# Patient Record
Sex: FEMALE | Race: WHITE | NOT HISPANIC OR LATINO | Employment: FULL TIME | URBAN - METROPOLITAN AREA
[De-identification: names, ages, dates, MRNs, and addresses within clinical notes are randomized per-mention and may not be internally consistent; named-entity substitution may affect disease eponyms.]

---

## 2019-10-01 ENCOUNTER — HOSPITAL ENCOUNTER (OUTPATIENT)
Dept: OTHER | Facility: HOSPITAL | Age: 49
Discharge: HOME | End: 2019-10-01
Attending: NURSE PRACTITIONER

## 2019-10-01 ENCOUNTER — OFFICE VISIT CONVERTED (OUTPATIENT)
Dept: FAMILY MEDICINE CLINIC | Age: 49
End: 2019-10-01
Attending: NURSE PRACTITIONER

## 2019-10-01 LAB
ANION GAP SERPL CALC-SCNC: 19 MMOL/L (ref 8–19)
BASOPHILS # BLD AUTO: 0.04 10*3/UL (ref 0–0.2)
BASOPHILS NFR BLD AUTO: 0.5 % (ref 0–3)
BUN SERPL-MCNC: 10 MG/DL (ref 5–25)
BUN/CREAT SERPL: 13 {RATIO} (ref 6–20)
CALCIUM SERPL-MCNC: 9.3 MG/DL (ref 8.7–10.4)
CHLORIDE SERPL-SCNC: 102 MMOL/L (ref 99–111)
CONV ABS IMM GRAN: 0.01 10*3/UL (ref 0–0.2)
CONV CO2: 22 MMOL/L (ref 22–32)
CONV IMMATURE GRAN: 0.1 % (ref 0–1.8)
CREAT UR-MCNC: 0.75 MG/DL (ref 0.5–0.9)
DEPRECATED RDW RBC AUTO: 43.4 FL (ref 36.4–46.3)
EOSINOPHIL # BLD AUTO: 0.06 10*3/UL (ref 0–0.7)
EOSINOPHIL # BLD AUTO: 0.7 % (ref 0–7)
ERYTHROCYTE [DISTWIDTH] IN BLOOD BY AUTOMATED COUNT: 12 % (ref 11.7–14.4)
GFR SERPLBLD BASED ON 1.73 SQ M-ARVRAT: >60 ML/MIN/{1.73_M2}
GLUCOSE SERPL-MCNC: 99 MG/DL (ref 65–99)
HCT VFR BLD AUTO: 42.2 % (ref 37–47)
HGB BLD-MCNC: 13.8 G/DL (ref 12–16)
LYMPHOCYTES # BLD AUTO: 2.91 10*3/UL (ref 1–5)
LYMPHOCYTES NFR BLD AUTO: 32.8 % (ref 20–45)
MCH RBC QN AUTO: 31.7 PG (ref 27–31)
MCHC RBC AUTO-ENTMCNC: 32.7 G/DL (ref 33–37)
MCV RBC AUTO: 96.8 FL (ref 81–99)
MONOCYTES # BLD AUTO: 0.54 10*3/UL (ref 0.2–1.2)
MONOCYTES NFR BLD AUTO: 6.1 % (ref 3–10)
NEUTROPHILS # BLD AUTO: 5.31 10*3/UL (ref 2–8)
NEUTROPHILS NFR BLD AUTO: 59.8 % (ref 30–85)
NRBC CBCN: 0 % (ref 0–0.7)
OSMOLALITY SERPL CALC.SUM OF ELEC: 287 MOSM/KG (ref 273–304)
PLATELET # BLD AUTO: 281 10*3/UL (ref 130–400)
PMV BLD AUTO: 11.3 FL (ref 9.4–12.3)
POTASSIUM SERPL-SCNC: 4.2 MMOL/L (ref 3.5–5.3)
RBC # BLD AUTO: 4.36 10*6/UL (ref 4.2–5.4)
SODIUM SERPL-SCNC: 139 MMOL/L (ref 135–147)
WBC # BLD AUTO: 8.87 10*3/UL (ref 4.8–10.8)

## 2019-10-03 LAB — BACTERIA UR CULT: NORMAL

## 2019-10-04 ENCOUNTER — HOSPITAL ENCOUNTER (OUTPATIENT)
Dept: OTHER | Facility: HOSPITAL | Age: 49
Discharge: HOME | End: 2019-10-04
Attending: NURSE PRACTITIONER

## 2020-02-26 ENCOUNTER — OFFICE VISIT CONVERTED (OUTPATIENT)
Dept: FAMILY MEDICINE CLINIC | Age: 50
End: 2020-02-26
Attending: NURSE PRACTITIONER

## 2020-04-04 ENCOUNTER — OFFICE VISIT CONVERTED (OUTPATIENT)
Dept: FAMILY MEDICINE CLINIC | Age: 50
End: 2020-04-04
Attending: NURSE PRACTITIONER

## 2020-04-16 ENCOUNTER — HOSPITAL ENCOUNTER (OUTPATIENT)
Dept: OTHER | Facility: HOSPITAL | Age: 50
Discharge: HOME | End: 2020-04-16
Attending: NURSE PRACTITIONER

## 2020-04-28 ENCOUNTER — OFFICE VISIT CONVERTED (OUTPATIENT)
Dept: FAMILY MEDICINE CLINIC | Age: 50
End: 2020-04-28
Attending: NURSE PRACTITIONER

## 2020-11-11 ENCOUNTER — OFFICE VISIT CONVERTED (OUTPATIENT)
Dept: FAMILY MEDICINE CLINIC | Age: 50
End: 2020-11-11
Attending: NURSE PRACTITIONER

## 2020-12-01 ENCOUNTER — HOSPITAL ENCOUNTER (OUTPATIENT)
Dept: OTHER | Facility: HOSPITAL | Age: 50
Discharge: HOME | End: 2020-12-01
Attending: NURSE PRACTITIONER

## 2021-05-18 NOTE — PROGRESS NOTES
Vickie Chen  1970     Office/Outpatient Visit    Visit Date: Sat 08:42 am    Provider: Nadine Evans N.P. (Assistant: Joe Ceja, )    Location: [facility]        Electronically signed by Nadine Evans N.P. on  2020 10:47:35 AM                             Subjective:        CC: Ms. Chen is a 49 year old female.  left shoulder/arm pain x 1 month;         HPI: 49 year old female presenting for TELEHEALTH visit regarding left shoulder pain x 1 month. She states that she has history of tendonitis in this arm. No known injury. Pain does not radiate. No neck pain. She does have full ROM, just hurts.    ROS:     CONSTITUTIONAL:  Negative for fatigue, fever and night sweats.      CARDIOVASCULAR:  Negative for chest pain and palpitations.      RESPIRATORY:  Negative for recent cough and dyspnea.      MUSCULOSKELETAL:  Positive for arthralgias and joint stiffness.   Negative for myalgias.      INTEGUMENTARY/BREAST:  Negative for rash.      NEUROLOGICAL:  Negative for headaches, paresthesias and weakness.          Past Medical History / Family History / Social History:         Last Reviewed on 2020 08:57 AM by Nadine Evans    Past Medical History:             PAST MEDICAL HISTORY         Positive for    Hypertension;         PREVENTIVE HEALTH MAINTENANCE             COLORECTAL CANCER SCREENING: The next colonoscopy is due  NOW     DENTAL CLEANING: was last done  - [place]     EYE EXAM: was last done  - [facility]     MAMMOGRAM: was last done  with normal results     PAP SMEAR: which was abnormal No longer indicated due to age and history         Surgical History:         hysterectomy (ovaries still in tact) - ;    ACL repair - ;         Family History:     Father:  at age 70;  IPF     Mother: Medical history unknown;  Hypertension;  mental disorder     Brother(s): Renal Failure ( autoimmune (on kidney transplant list) - age 54 )      Son(s): Hypothyroidism     Paternal Grandfather:  at age 60s; Cause of death was unknown     Paternal Grandmother: ;  Alzheimer's Disease         Social History:     Occupation: [employer]     Marital Status:      Children: 1 child         Tobacco/Alcohol/Supplements:     Last Reviewed on 2020 08:57 AM by Nadine Evans    Tobacco: Current Smoker: She currently smokes every day, 1 pack per day.          Alcohol: Frequency: Socially     Caffeine:  She admits to consuming caffeine via coffee and soda.          Substance Abuse History:     Last Reviewed on 2020 08:57 AM by Nadine Evans    None         Mental Health History:     Last Reviewed on 2020 08:57 AM by Nadine Evans    NEGATIVE         Communicable Diseases (eg STDs):     Reportable health conditions;     Chlamydia ( diagnosed 24 years ago )    carrier of strep         Immunizations:     Fluzone Quadrivalent (3+ years) 10/1/2019        Allergies:     Last Reviewed on 2020 08:57 AM by Nadine Evans    Augmentin:          Current Medications:     Last Reviewed on 2020 08:57 AM by Nadine Evans    lisinopriL 20 mg oral tablet [1 tab daily]    buPROPion  mg oral tablet [take 1 1/2 tablet (150mg) by oral route 2 times per day]    ibuprofen 800 mg oral tablet [take 1 tablet (800 mg) by oral route 3 times per day ]    multivitamin oral capsule [1 daily]    magnesium  [1 daily]    Vitamin D3  [1 daily]    B12  [2500 mg 1 daily]    ascorbic acid (vitamin C) 500 mg oral tablet,chewable [1 daily]    zinc 50 mg oral tablet [1 daily]        Assessment:         M25.512   Pain in left shoulder           ORDERS:         Meds Prescribed:       [New Rx] predniSONE 20 mg oral tablet [take 2 tablets (40mg) by mouth daily x 5 days], #10 (ten) tablets, Refills: 0 (zero)       [New Rx] meloxicam 15 mg oral tablet [take 1 tablet (15 mg) by oral route once daily], #30 (thirty) tablets, Refills: 1 (one)                  Plan:         Pain in left shoulderrest, alternate heat and ice, stretch. Take prednisone as rx. Do not take ibuprofen or mobic while taking prednisone. May start mobic after finishing steroid. Notify clinic if shoulder pain does not improve or if it worsens. Xray and possible ortho referral may be needed. Pt v/u and had no further questions.     Telehealth: Verbal consent obtained for visit to occur via phone call; Staff, other than provider, present during telephone visit include Joe PACKER MA; Total time spent was 5 minutes; [id number]--Wwtehksyi E/M 5-10 minutes           Prescriptions:       [New Rx] predniSONE 20 mg oral tablet [take 2 tablets (40mg) by mouth daily x 5 days], #10 (ten) tablets, Refills: 0 (zero)       [New Rx] meloxicam 15 mg oral tablet [take 1 tablet (15 mg) by oral route once daily], #30 (thirty) tablets, Refills: 1 (one)             Charge Capture:         Primary Diagnosis:     M25.512  Pain in left shoulder           Orders:      [id number]  Phys/QHP telephone evaluation 5-10 min  (In-House)

## 2021-05-18 NOTE — PROGRESS NOTES
Vickie Chen 1970     Office/Outpatient Visit    Visit Date: Tue, Oct 1, 2019 03:55 pm    Provider: Patricia Valladares N.P. (Assistant: Rosalba Hayes LPN)    Location: [facility]        Electronically signed by Patricia Valladares N.P. on  10/02/2019 10:41:33 PM                             SUBJECTIVE:        CC:     Ms. Chen is a 49 year old female.  Last monday, pt woke up and her vagina and her anus hurts, and she said that it hurts in her hips. She said it doesn't hurt to pee but she said she has to force it.  She would like a flu shot today as well.;         HPI:         PHQ-9 Depression Screening: Completed form scanned and in chart; Total Score 4         Concerning pelvic pain, female, the location is not described.  It began 1 week ago.  pain sharp in the anus - forcing urine stream - pain seems to be moving  - feels like a pressure in her pelvic area - sharp when voiding it is worse  BM does not make worse Takes ibuprofen daily - this seems to help with the pain     ROS:     CONSTITUTIONAL:  Negative for chills, fatigue and fever.      CARDIOVASCULAR:  Negative for pedal edema.      RESPIRATORY:  Negative for recent cough and dyspnea.      GASTROINTESTINAL:  Positive for abdominal pain, nausea ( only one day - last night ) and blood in stool x 1 episode  - small amount of blood in stool.   Negative for constipation, diarrhea, hematemesis or vomiting.      GENITOURINARY:  Positive for difficulty starting urine stream - does not feel empty when finshed urinating.   Negative for dysuria, hematuria, vaginal discharge or vaginal itching.      MUSCULOSKELETAL:  Negative for arthralgias and myalgias.      INTEGUMENTARY/BREAST:  Negative for pruritis and rash.      NEUROLOGICAL:  Negative for dizziness, fainting, headaches and paresthesias.      ENDOCRINE:  Negative for hair loss, polydipsia and polyphagia.      ALLERGIC/IMMUNOLOGIC:  Negative for seasonal allergies.      PSYCHIATRIC:   Negative for anxiety, depression and suicidal thoughts.          PMH/FMH/SH:     Past Medical History:             PAST MEDICAL HISTORY         Positive for    Hypertension;         PREVENTIVE HEALTH MAINTENANCE             COLORECTAL CANCER SCREENING: The next colonoscopy is due  NOW     DENTAL CLEANING: was last done  - [place]     EYE EXAM: was last done  - [facility]     MAMMOGRAM: was last done  with normal results     PAP SMEAR: which was abnormal No longer indicated due to age and history         Surgical History:         hysterectomy (ovaries still in tact) - ;    ACL repair - ;         Family History:     Father:  at age 70;  IPF     Mother: Medical history unknown;  Hypertension;  mental disorder     Brother(s): Renal Failure ( autoimmune (on kidney transplant list) - age 54 )     Son(s): Hypothyroidism     Paternal Grandfather:  at age 60s; Cause of death was unknown     Paternal Grandmother: ;  Alzheimer's Disease         Social History:     Occupation: [employer]     Marital Status:      Children: 1 child         Tobacco/Alcohol/Supplements:     Last Reviewed on 10/01/2019 04:00 PM by Spurling, Sarah C    Tobacco: Current Smoker: She currently smokes every day, 1 pack per day.          Alcohol: Frequency: Socially     Caffeine:  She admits to consuming caffeine via coffee and soda.          Substance Abuse History:     None         Mental Health History:     NEGATIVE         Communicable Diseases (eg STDs):     Reportable health conditions;     Chlamydia ( diagnosed 24 years ago )    carrier of strep             Current Problems:     Hypertension     Rectal pain     Pelvic pain, female         Immunizations:     Fluzone Quadrivalent (3+ years) 10/1/2019         Allergies:     Last Reviewed on 10/01/2019 04:00 PM by Spurling, Sarah C    Augmentin:        Current Medications:     Last Reviewed on 10/01/2019 04:01 PM by Spurling, Sarah C    Lisinopril 20mg Tablet 1 tab  daily         OBJECTIVE:        Vitals:         Current: 10/1/2019 4:04:03 PM    Ht:  5 ft, 6 in;  Wt: 186.6 lbs;  BMI: 30.1    T: 98.1 F;  BP: 128/86 mm Hg (left arm, sitting);  P: 93 bpm (left arm (BP Cuff), sitting)        Exams:     PHYSICAL EXAM:     GENERAL: vital signs recorded - well developed, well nourished;  no apparent distress;     NECK: range of motion is normal;     RESPIRATORY: normal appearance and symmetric expansion of chest wall; normal respiratory rate and pattern with no distress; normal breath sounds with no rales, rhonchi, wheezes or rubs;     CARDIOVASCULAR: normal rate; rhythm is regular;  no edema;     GASTROINTESTINAL: mild suprapubic tenderness;  normal bowel sounds; rectal exam: no masses; extremely tender in the are of 4 and 5oclock internally with palpation;     GENITOURINARY: vagina: tenderness to palpation in the vaginal canal at 3 and 5 oclock; ; Chaperone: DECLINES     LYMPHATIC: no enlargement of cervical or facial nodes; no supraclavicular nodes;     MUSCULOSKELETAL: normal gait; normal range of motion of all major muscle groups; no limb or joint pain with range of motion;     NEUROLOGIC: mental status: alert and oriented x 3;     PSYCHIATRIC: appropriate affect and demeanor; normal speech pattern; normal thought and perception;         Lab/Test Results:             Glucose, Urine:  Neg (10/01/2019),     Bilirubin, urine:  Negative (10/01/2019),     Ketones, Urine Strip:  Negative (10/01/2019),     Specific Gravity, urine:  1.020 (10/01/2019),     pH, urine:  6.5 (10/01/2019),     Protein Urine QL:  negative (10/01/2019),     Urobilinogen, urine:  0.2 E.U./dL (10/01/2019),     Nitrite, Urine:  Negative (10/01/2019),     Leukoctyes, urine:  Negative (10/01/2019),     Appearance:  Clear (10/01/2019),     collection source:  Clean-catch (10/01/2019),     Color:  Yellow (10/01/2019),     Performed by::  rebekah (10/01/2019),             Procedures:     Vaccination against other viral  diseases, Influenza     1. Influenza, seasonal PF (children 6-35 months): 0.5 ml unit dose given IM in the left upper arm; administered by ad;  lot number [id number]; expires 06/30/2020             ASSESSMENT           V79.0   Z13.31  Screening for depression              DDx:     625.9   R10.2  Pelvic pain, female              DDx:     V04.81   Z23  Vaccination against other viral diseases, Influenza              DDx:     569.42   K62.89  Rectal pain              DDx:         ORDERS:         Lab Orders:       [id number]  Urinalysis, automated, without microscopy  (In-House)         43059  [facility] Urine Culture  (Send-Out)         15118  [facility] Basic Metabolic Panel  (Send-Out)         36[facility]  [facility] diff  ([facility])           Procedures Ordered:       38756  Immunization administration; one vaccine  (In-House)           Other Orders:       98909  Influenza virus vaccine, quadrivalent, split virus, preservative free 3 years of age & older  (In-House)         55966  Computed tomography, abdomen and pelvis; without contrast material  (Send-Out)           Depression screen negative  (In-House)                   PLAN:          Screening for depression     MIPS PHQ-9 Depression Screening: Completed form scanned and in chart; Total Score 4; Negative Depression Screen           Orders:         Depression screen negative  (In-House)            Pelvic pain, female may be perirectal abscess - will get CT to evaluate     LABORATORY:  Labs ordered to be performed today include Urine culture and UA dip in office no micro.      FOLLOW-UP TESTING #1:    RADIOLOGY:  I have ordered Test to be ordered CT of abdomen and pelvis w/o contrast to be done today.            Orders:       18361  Urinalysis, automated, without microscopy  (In-House)         77579  [facility] Urine Culture  (Send-Out)         19007  Computed tomography, abdomen and pelvis; without contrast material  (Send-Out)             Vaccination against other viral diseases, Influenza         IMMUNIZATIONS given today: Influenza Quadrivalent psv free shot 3 & up.            Orders:       83041  Influenza virus vaccine, quadrivalent, split virus, preservative free 3 years of age & older  (In-House)         66875  Immunization administration; one vaccine  (In-House)            Rectal pain     LABORATORY:  Labs ordered to be performed today include basic metabolic panel and CBC.            Orders:       34077  [facility] Basic Metabolic Panel  (Send-Out)         09787  [facility] CBC with 3 part diff  (Send-Out)               CHARGE CAPTURE           **Please note: ICD descriptions below are intended for billing purposes only and may not represent clinical diagnoses**        Primary Diagnosis:         V79.0 Screening for depression            Z13.31    Encounter for screening for depression              Orders:          70200   Office visit - new pt, level 3  (In-House)                Depression screen negative  (In-House)           625.9 Pelvic pain, female            R10.2    Pelvic and perineal pain              Orders:          53856   Urinalysis, automated, without microscopy  (In-House)           V04.81 Vaccination against other viral diseases, Influenza            Z23    Encounter for immunization              Orders:          08722   Influenza virus vaccine, quadrivalent, split virus, preservative free 3 years of age & older  (In-House)             56734   Immunization administration; one vaccine  (In-House)           569.42 Rectal pain            K62.89    Other specified diseases of anus and rectum        ADDENDUMS:      ____________________________________    Addendum: 10/03/2019 05:19 PM - One, Team        provider inf of need for peer to peer she wants me to inf pt that if her pain gets worse to go to the er ok to change the order to with PO contrast./km

## 2021-05-18 NOTE — PROGRESS NOTES
Vickie Chen  1970     Office/Outpatient Visit    Visit Date: Wed, Feb 26, 2020 10:34 am    Provider: Patricia Valladares N.P. (Assistant: Georgia Chavez MA)    Location: [facility]        Electronically signed by Patricia Valladares N.P. on  02/27/2020 10:34:12 AM                             Subjective:        CC: Ms. Chen is a 49 year old female.  This is a follow-up visit.  med refill;         HPI:           Ms. Chen presents with essential (primary) hypertension.  Compliance with treatment has been good.  She is tolerating the medication well without side effects.  She did not bring her blood pressure diary, but says that pressures have been okay.      ROS:     CONSTITUTIONAL:  Negative for chills, fatigue and fever.      CARDIOVASCULAR:  Negative for chest pain and pedal edema.      RESPIRATORY:  Negative for recent cough and dyspnea.      GASTROINTESTINAL:  Negative for abdominal pain, constipation, diarrhea, heartburn, nausea and vomiting.      MUSCULOSKELETAL:  Negative for arthralgias and myalgias.      INTEGUMENTARY/BREAST:  Negative for pruritis and rash.      NEUROLOGICAL:  Negative for dizziness, fainting, headaches and paresthesias.      ENDOCRINE:  Negative for hair loss, polydipsia and polyphagia.      ALLERGIC/IMMUNOLOGIC:  Negative for seasonal allergies.      PSYCHIATRIC:  Positive for sadness.   Negative for anxiety, depression, sleep disturbance or suicidal thoughts.          Past Medical History / Family History / Social History:         Past Medical History:             PAST MEDICAL HISTORY         Positive for    Hypertension;         PREVENTIVE HEALTH MAINTENANCE             COLORECTAL CANCER SCREENING: The next colonoscopy is due  NOW     DENTAL CLEANING: was last done 2019 - [place]     EYE EXAM: was last done 2019 - [facility]     MAMMOGRAM: was last done 2012 with normal results     PAP SMEAR: which was abnormal No longer indicated due to age and history          Surgical History:         hysterectomy (ovaries still in tact) - ;    ACL repair - ;         Family History:     Father:  at age 70;  IPF     Mother: Medical history unknown;  Hypertension;  mental disorder     Brother(s): Renal Failure ( autoimmune (on kidney transplant list) - age 54 )     Son(s): Hypothyroidism     Paternal Grandfather:  at age 60s; Cause of death was unknown     Paternal Grandmother: ;  Alzheimer's Disease         Social History:     Occupation: [employer]     Marital Status:      Children: 1 child         Tobacco/Alcohol/Supplements:     Last Reviewed on 10/01/2019 04:00 PM by Spurling, Sarah C    Tobacco: Current Smoker: She currently smokes every day, 1 pack per day.          Alcohol: Frequency: Socially     Caffeine:  She admits to consuming caffeine via coffee and soda.          Substance Abuse History:     None         Mental Health History:     NEGATIVE         Communicable Diseases (eg STDs):     Reportable health conditions;     Chlamydia ( diagnosed 24 years ago )    carrier of strep         Current Problems:     Hypertension        Immunizations:     Fluzone Quadrivalent (3+ years) 10/1/2019        Allergies:     Last Reviewed on 2020 10:34 AM by Georgia Chavez    Augmentin:          Current Medications:     Last Reviewed on 2020 10:34 AM by Georgia Chavez    Lisinopril 20mg Tablet [1 tab daily]        Objective:        Vitals:         Historical:     10/1/2019  Wt:   186.6lbs    Current: 2020 10:39:22 AM    Ht:  5 ft, 6 in;  Wt: 194.6 lbs;  BMI: 31.4T: 97.3 F (oral);  BP: 128/76 mm Hg (right arm, sitting);  P: 93 bpm (right arm (BP Cuff), sitting);  sCr: 0.75 mg/dL;  GFR: 101.48        Exams:     PHYSICAL EXAM:     GENERAL: vital signs recorded - well developed, well nourished;  no apparent distress;     EYES: extraocular movements intact; PERRL;     E/N/T: EARS: external auditory canal normal;  bilateral TMs are normal;  NOSE:  normal  nasal mucosa, septum, turbinates, and sinuses; OROPHARYNX:  normal mucosa, dentition, gingiva, and posterior pharynx;     NECK: range of motion is normal;     RESPIRATORY: normal appearance and symmetric expansion of chest wall; normal respiratory rate and pattern with no distress; normal breath sounds with no rales, rhonchi, wheezes or rubs;     CARDIOVASCULAR: normal rate; rhythm is regular;  no edema;     GASTROINTESTINAL: nontender; normal bowel sounds; no organomegaly;     LYMPHATIC: no enlargement of cervical or facial nodes; no supraclavicular nodes;     MUSCULOSKELETAL: normal gait; normal range of motion of all major muscle groups; no limb or joint pain with range of motion;     NEUROLOGIC: mental status: alert and oriented x 3;     PSYCHIATRIC: appropriate affect and demeanor; normal speech pattern; normal thought and perception;         Assessment:         I10   Essential (primary) hypertension       F17.200   Nicotine dependence, unspecified, uncomplicated           ORDERS:         Meds Prescribed:       [New Rx] buPROPion  mg oral tablet [take 1 1/2 tablet (150mg) by oral route 2 times per day], #90 (ninety) tablets, Refills: 5 (five)       [Refilled] lisinopriL 20 mg oral tablet [1 tab daily], #90 (ninety) tablets, Refills: 1 (one)         Other Orders:         Smoking and Tobacco Cessation 3 to 10 minutes  (In-House)                      Plan:         Essential (primary) hypertension          Prescriptions:       [Refilled] lisinopriL 20 mg oral tablet [1 tab daily], #90 (ninety) tablets, Refills: 1 (one)         Nicotine dependence, unspecified, uncomplicated        RECOMMENDATIONS given include: Counseled on smoking cessation and advised of the benefits to patient's health if she were to stop smoking. Counseling for 3 to 10 minutes.            Prescriptions:       [New Rx] buPROPion  mg oral tablet [take 1 1/2 tablet (150mg) by oral route 2 times per day], #90 (ninety) tablets,  Refills: 5 (five)           Orders:         Smoking and Tobacco Cessation 3 to 10 minutes  (In-House)                  Patient Recommendations:        For  Nicotine dependence, unspecified, uncomplicated:        Stop smoking.              Charge Capture:         Primary Diagnosis:     I10  Essential (primary) hypertension           Orders:      87530  Office/outpatient visit; established patient, level 3  (In-House)              F17.200  Nicotine dependence, unspecified, uncomplicated           Orders:        Smoking and Tobacco Cessation 3 to 10 minutes  (In-House)

## 2021-05-18 NOTE — PROGRESS NOTES
Vickie Chen  1970     Office/Outpatient Visit    Visit Date: Wed, Nov 11, 2020 11:38 am    Provider: Patricia Valladares N.P. (Assistant: Che Streeter LPN)    Location: [facility]        Electronically signed by Patricia Valladares N.P. on  11/17/2020 06:54:26 AM                             Subjective:        CC: Ms. Chen is a 50 year old female.  presents today due to knee pain         HPI:           Complaint of pain in right knee..  The symptom began 2 days ago.  WAS AT WORK , CLIMBING ON A BELT TO CLEAR A BELT FROM CLOGGED PACKAGES - FELT A POP IN THE KNEE - PAIN   NO SWELLING  NOTED           Complaint of carbuncle of groin..  The symptom began years ago.  This area comes and goes regularly - she does shave but keeps that area shaved daily.  She has never had to have it I&Dd but says they are starting to happen more frequently.      ROS:     CONSTITUTIONAL:  Negative for chills, fatigue and fever.      CARDIOVASCULAR:  Negative for chest pain and pedal edema.      MUSCULOSKELETAL:  Positive for joint stiffness (right knee).   Negative for arthralgias or myalgias.      INTEGUMENTARY/BREAST:  Negative for pruritis and rash.      NEUROLOGICAL:  Positive for weakness ( right knee weakness / giving way when walks ).   Negative for dizziness, fainting, headaches or paresthesias.          Past Medical History / Family History / Social History:         Last Reviewed on 4/28/2020 10:30 AM by Nadine Evans    Past Medical History:             PAST MEDICAL HISTORY         Positive for    Hypertension;         PREVENTIVE HEALTH MAINTENANCE             COLORECTAL CANCER SCREENING: The next colonoscopy is due  NOW     DENTAL CLEANING: was last done 2019 - [place]     EYE EXAM: was last done 2019 - [facility]     MAMMOGRAM: was last done 2012 with normal results     PAP SMEAR: which was abnormal No longer indicated due to age and history         Surgical History:          hysterectomy (ovaries still in tact) - ;    ACL repair - ;         Family History:     Father:  at age 70;  IPF     Mother: Medical history unknown;  Hypertension;  mental disorder     Brother(s): Renal Failure ( autoimmune (on kidney transplant list) - age 54 )     Son(s): Hypothyroidism     Paternal Grandfather:  at age 60s; Cause of death was unknown     Paternal Grandmother: ;  Alzheimer's Disease         Social History:     Occupation: [employer]     Marital Status:      Children: 1 child         Tobacco/Alcohol/Supplements:     Last Reviewed on 2020 10:30 AM by Nadine Evans    Tobacco: Current Smoker: She currently smokes every day, 1 pack per day.          Alcohol: Frequency: Socially     Caffeine:  She admits to consuming caffeine via coffee and soda.          Substance Abuse History:     Last Reviewed on 2020 10:30 AM by Nadine Evans    None         Mental Health History:     Last Reviewed on 2020 10:30 AM by Nadine Evans    NEGATIVE         Communicable Diseases (eg STDs):     Reportable health conditions;     Chlamydia ( diagnosed 24 years ago )    carrier of strep         Current Problems:     Last Reviewed on 2020 10:30 AM by Nadine Evans    Nicotine dependence, unspecified, uncomplicated    Essential (primary) hypertension    Pain in left shoulder    Other long term (current) drug therapy        Immunizations:     Fluzone Quadrivalent (3+ years) 10/1/2019        Allergies:     Last Reviewed on 2020 10:30 AM by Nadine Evans    Augmentin:          Current Medications:     Last Reviewed on 2020 10:30 AM by Nadine Evans    ibuprofen 800 mg oral tablet [take 1 tablet (800 mg) by oral route 3 times per day ]    multivitamin oral capsule [1 daily]    magnesium  [1 daily]    Vitamin D3  [1 daily]    B12  [2500 mg 1 daily]    ascorbic acid (vitamin C) 500 mg oral tablet,chewable [1 daily]    zinc 50 mg oral tablet [1 daily]     lisinopriL 20 mg oral tablet [TAKE 1 TABLET DAILY]    buPROPion  mg oral tablet [take 1 1/2 tablet (150mg) by oral route 2 times per day]    meloxicam 15 mg oral tablet [TAKE 1 TABLET BY MOUTH EVERY DAY]    traMADol 50 mg oral tablet [take 1 tablet (50 mg) by oral route every 6 hours as needed]    cyclobenzaprine 10 mg oral tablet [take 1 tablet (10 mg) by oral route 3 times daily as needed for muscle spasm]        Objective:        Vitals:         Current: 11/11/2020 11:42:18 AM    Ht:  5 ft, 6 in;  Wt: 198.2 lbs;  BMI: 32.0T: 96.9 F (oral);  BP: 136/63 mm Hg (right arm, sitting);  P: 79 bpm (right arm (BP Cuff), sitting);  sCr: 0.75 mg/dL;  GFR: 101.17        Exams:     PHYSICAL EXAM:     GENERAL: vital signs recorded - well developed, well nourished;  no apparent distress;     RESPIRATORY: normal appearance and symmetric expansion of chest wall; normal respiratory rate and pattern with no distress; normal breath sounds with no rales, rhonchi, wheezes or rubs;     CARDIOVASCULAR: normal rate; rhythm is regular;  no edema;     MUSCULOSKELETAL: gait: affected by a right leg limp;  normal range of motion of all major muscle groups; pain with range of motion in: right knee extension;  positive Micha test     NEUROLOGIC: mental status: alert and oriented x 3;     PSYCHIATRIC: appropriate affect and demeanor; normal speech pattern; normal thought and perception;         Assessment:         Z23   Encounter for immunization       M25.561   Pain in right knee       L02.234   Carbuncle of groin           ORDERS:         Meds Prescribed:       [New Rx] doxycycline hyclate 100 mg oral capsule [take 1 capsule (100 mg) by oral route 2 times per day 7-10 days], #20 (twenty) capsules, Refills: 0 (zero)       [New Rx] Diflucan 150 mg oral tablet [take 1 tablet (150 mg) by oral route now  may repeat in 1 week], #2 (two) tablets, Refills: 0 (zero)         Radiology/Test Orders:       [id number]  Right MRI, any joint of lower  extremity w/o contrast  (Send-Out)              Procedures Ordered:       [id number]  Immunization administration; one vaccine  (In-House)              Other Orders:       [id number]  Influenza virus vaccine, quadrivalent, split virus, preservative free 3 years of age & older  (In-House)                      Plan:         Encounter for immunization          Immunizations:       99903  Immunization administration; one vaccine  (In-House)            86177  Influenza virus vaccine, quadrivalent, split virus, preservative free 3 years of age & older  (In-House)                Dose (ml): 0.5  Site: left deltoid  Route: intramuscular  Administered by: Che Streeter          : [employer]  Lot #: [id number]  Exp: 06/30/2021          NDC: [address]-53        Pain in right kneeinstructed to get neoprene sleeve.  Will get MRI of the knee and have her off work until the MRI results are back  or sooner if she feels she is able to work if we are unable to get the MRI soon -  I would recommend rest, Ice, resume NSAIDs as previous  and follow up should she worsen        FOLLOW-UP TESTING #1:    RADIOLOGY:  I have ordered a knee MRI to be done today.            Orders:       [id number]  Right MRI, any joint of lower extremity w/o contrast  (Send-Out)              Carbuncle of groin          Prescriptions:       [New Rx] doxycycline hyclate 100 mg oral capsule [take 1 capsule (100 mg) by oral route 2 times per day 7-10 days], #20 (twenty) capsules, Refills: 0 (zero)       [New Rx] Diflucan 150 mg oral tablet [take 1 tablet (150 mg) by oral route now  may repeat in 1 week], #2 (two) tablets, Refills: 0 (zero)             Charge Capture:         Primary Diagnosis:     Z23  Encounter for immunization           Orders:      [id number]  Office/outpatient visit; established patient, level 3  (In-House)            55255  Immunization administration; one vaccine  (In-House)            08384  Influenza virus vaccine, quadrivalent, split  virus, preservative free 3 years of age & older  (In-House)              M25.561  Pain in right knee     L02.234  Carbuncle of groin

## 2021-05-18 NOTE — PROGRESS NOTES
Vickie Chen  1970     Office/Outpatient Visit    Visit Date:  09:54 am    Provider: Nadine Evans N.P. (Assistant: Vesta Mota RN)    Location: [facility]        Electronically signed by Nadine Evans N.P. on  2020 10:50:07 AM                             Subjective:        CC: Ms. Chen is a 49 year old female.  follow up on arm pain; [employer]        HPI: 49 year old female presenting for TELEHEALTH visit c/o continued left shoulder pain. The only incident that may have caused pain is screen door being hard to open. She states that muscles are now twitching in her left arm and pain is centered right at left shoulder joint. Her MRI is scheduled for May 9th.    ROS:     CONSTITUTIONAL:  Negative for fatigue and fever.      CARDIOVASCULAR:  Negative for chest pain.      RESPIRATORY:  Negative for recent cough and dyspnea.      MUSCULOSKELETAL:  Positive for arthralgias, joint stiffness and limb pain.      INTEGUMENTARY/BREAST:  Negative for rash.      NEUROLOGICAL:  Positive for weakness ( left upper extremity ).   Negative for dizziness or paresthesias.          Past Medical History / Family History / Social History:         Last Reviewed on 2020 10:30 AM by Nadine Evans    Past Medical History:             PAST MEDICAL HISTORY         Positive for    Hypertension;         PREVENTIVE HEALTH MAINTENANCE             COLORECTAL CANCER SCREENING: The next colonoscopy is due  NOW     DENTAL CLEANING: was last done  - [place]     EYE EXAM: was last done  - [facility]     MAMMOGRAM: was last done  with normal results     PAP SMEAR: which was abnormal No longer indicated due to age and history         Surgical History:         hysterectomy (ovaries still in tact) - ;    ACL repair - ;         Family History:     Father:  at age 70;  IPF     Mother: Medical history unknown;  Hypertension;  mental disorder     Brother(s): Renal  Failure ( autoimmune (on kidney transplant list) - age 54 )     Son(s): Hypothyroidism     Paternal Grandfather:  at age 60s; Cause of death was unknown     Paternal Grandmother: ;  Alzheimer's Disease         Social History:     Occupation: [employer]     Marital Status:      Children: 1 child         Tobacco/Alcohol/Supplements:     Last Reviewed on 2020 10:30 AM by Nadine Evans    Tobacco: Current Smoker: She currently smokes every day, 1 pack per day.          Alcohol: Frequency: Socially     Caffeine:  She admits to consuming caffeine via coffee and soda.          Substance Abuse History:     Last Reviewed on 2020 10:30 AM by Nadine Evans    None         Mental Health History:     Last Reviewed on 2020 10:30 AM by Nadine Evans    NEGATIVE         Communicable Diseases (eg STDs):     Reportable health conditions;     Chlamydia ( diagnosed 24 years ago )    carrier of strep         Immunizations:     Fluzone Quadrivalent (3+ years) 10/1/2019        Allergies:     Last Reviewed on 2020 10:30 AM by Nadine Evans    Augmentin:          Current Medications:     Last Reviewed on 2020 10:30 AM by Nadine Evans    ibuprofen 800 mg oral tablet [take 1 tablet (800 mg) by oral route 3 times per day ]    multivitamin oral capsule [1 daily]    magnesium  [1 daily]    Vitamin D3  [1 daily]    B12  [2500 mg 1 daily]    ascorbic acid (vitamin C) 500 mg oral tablet,chewable [1 daily]    zinc 50 mg oral tablet [1 daily]    lisinopriL 20 mg oral tablet [1 tab daily]    buPROPion  mg oral tablet [take 1 1/2 tablet (150mg) by oral route 2 times per day]    meloxicam 15 mg oral tablet [take 1 tablet (15 mg) by oral route once daily]    traMADol 50 mg oral tablet [take 1 tablet (50 mg) by oral route every 6 hours as needed]        Objective:        Exams:     PHYSICAL EXAM:     GENERAL: well developed, well nourished;  well groomed;  no apparent distress;      RESPIRATORY: normal respiratory rate and pattern with no distress;     CARDIOVASCULAR: normal rate; rhythm is regular;  no systolic murmur; no edema;     MUSCULOSKELETAL: normal gait; pain with range of motion in: left shoulder extension;  normal overall tone     NEUROLOGIC: mental status: alert and oriented x 3;     PSYCHIATRIC:  appropriate affect and demeanor; normal speech pattern; grossly normal memory;         Assessment:         M25.512   Pain in left shoulder           ORDERS:         Meds Prescribed:       [New Rx] cyclobenzaprine 10 mg oral tablet [take 1 tablet (10 mg) by oral route 3 times daily as needed for muscle spasm], #30 (thirty) tablets, Refills: 0 (zero)                 Plan:         Pain in left shoulderPt to take Ultram. Can take meloxicam as well. Flexeril as needed for muscle spasms. Keep scheduled appt for MRI and notify clinic with any acute concerns or issues. Pt v/u and had no further questions upon d/c.     Telehealth: Verbal consent obtained for visit to occur via televideo conferencing; Staff, other than provider, present during telephone visit include Vesta Mota MA           Prescriptions:       [New Rx] cyclobenzaprine 10 mg oral tablet [take 1 tablet (10 mg) by oral route 3 times daily as needed for muscle spasm], #30 (thirty) tablets, Refills: 0 (zero)             Charge Capture:         Primary Diagnosis:     M25.512  Pain in left shoulder           Orders:      [id number]  Office/outpatient visit; established patient, level 3  (In-House)

## 2021-05-25 ENCOUNTER — HOSPITAL ENCOUNTER (OUTPATIENT)
Dept: OTHER | Facility: HOSPITAL | Age: 51
Discharge: HOME | End: 2021-05-25
Attending: NURSE PRACTITIONER

## 2021-05-25 ENCOUNTER — OFFICE VISIT CONVERTED (OUTPATIENT)
Dept: FAMILY MEDICINE CLINIC | Age: 51
End: 2021-05-25
Attending: NURSE PRACTITIONER

## 2021-05-25 LAB
ALBUMIN SERPL-MCNC: 4.2 G/DL (ref 3.5–5)
ALBUMIN/GLOB SERPL: 1.4 {RATIO} (ref 1.4–2.6)
ALP SERPL-CCNC: 63 U/L (ref 53–141)
ALT SERPL-CCNC: 18 U/L (ref 10–40)
ANION GAP SERPL CALC-SCNC: 14 MMOL/L (ref 8–19)
AST SERPL-CCNC: 17 U/L (ref 15–50)
BILIRUB SERPL-MCNC: 0.23 MG/DL (ref 0.2–1.3)
BUN SERPL-MCNC: 8 MG/DL (ref 5–25)
BUN/CREAT SERPL: 12 {RATIO} (ref 6–20)
CALCIUM SERPL-MCNC: 9.1 MG/DL (ref 8.7–10.4)
CHLORIDE SERPL-SCNC: 102 MMOL/L (ref 99–111)
CONV CO2: 25 MMOL/L (ref 22–32)
CONV TOTAL PROTEIN: 7.3 G/DL (ref 6.3–8.2)
CREAT UR-MCNC: 0.65 MG/DL (ref 0.5–0.9)
GFR SERPLBLD BASED ON 1.73 SQ M-ARVRAT: >60 ML/MIN/{1.73_M2}
GLOBULIN UR ELPH-MCNC: 3.1 G/DL (ref 2–3.5)
GLUCOSE SERPL-MCNC: 102 MG/DL (ref 65–99)
OSMOLALITY SERPL CALC.SUM OF ELEC: 283 MOSM/KG (ref 273–304)
POTASSIUM SERPL-SCNC: 4.3 MMOL/L (ref 3.5–5.3)
SODIUM SERPL-SCNC: 137 MMOL/L (ref 135–147)

## 2021-06-05 NOTE — PROGRESS NOTES
Vickie Chen  1970     Office/Outpatient Visit    Visit Date: Tue, May 25, 2021 10:38 am    Provider: Patricia Valladares N.P. (Assistant: Radha Yun MA)    Location: [facility]        Electronically signed by Patricia Valladares N.P. on  05/25/2021 07:06:37 PM                             Subjective:        CC: Ms. Chen is a 50 year old female.  This is a follow-up visit.  med refills, pt out of buproprion, not sure if she wants to continue it;         HPI:           Patient to be evaluated for essential (primary) hypertension.  This was first diagnosed several years ago.  Her current cardiac medication regimen includes an ACE inhibitor ( Lisinopril ).  Compliance with treatment has been good.  She is tolerating the medication well without side effects.  She did not bring her blood pressure diary, but says that pressures have been okay.            Complaint of attention and concentration deficit..  Ms. Chen is concerned that she has recently been instructed to re-enter school and is having a very hard time with concentraion and maintaining grades/ work.  She is working online as a full time student and working full time along with her life duties and feels that she needs some assistance.  She has never been diagnosed with ADD but her son was as a child.            Complaint of nicotine dependence, unspecified, uncomplicated..  reports taht the generic wellbutrin did not help and wants to see if brand name will help as it did in the past     ROS:     CONSTITUTIONAL:  Positive for fatigue.   Negative for chills or fever.      CARDIOVASCULAR:  Negative for chest pain and pedal edema.      RESPIRATORY:  Negative for recent cough and dyspnea.      PSYCHIATRIC:  Positive for anxiety, feelings of stress and difficulty concentrating.   Negative for depression, anhedonia, mood swings or suicidal thoughts.          Past Medical History / Family History / Social History:         Last  Reviewed on 2021 11:19 AM by Patricia Valladares    Past Medical History:             PAST MEDICAL HISTORY         Positive for    Hypertension;         PREVENTIVE HEALTH MAINTENANCE             COLORECTAL CANCER SCREENING: The next colonoscopy is due  NOW     DENTAL CLEANING: was last done  - [place]     EYE EXAM: was last done  - [facility]     MAMMOGRAM: was last done  with normal results     PAP SMEAR: which was abnormal No longer indicated due to age and history         Surgical History:         hysterectomy (ovaries still in tact) - ;    ACL repair - ;         Family History:     Father:  at age 70;  IPF     Mother: Medical history unknown;  Hypertension;  mental disorder     Brother(s): Renal Failure ( autoimmune (on kidney transplant list) - age 54 )     Son(s): Hypothyroidism     Paternal Grandfather:  at age 60s; Cause of death was unknown     Paternal Grandmother: ;  Alzheimer's Disease         Social History:     Occupation: [employer]     Marital Status:      Children: 1 child         Tobacco/Alcohol/Supplements:     Last Reviewed on 2021 10:41 AM by Radha Yun    Tobacco: Current Smoker: She currently smokes every day, 1 pack per day.          Alcohol: Frequency: Socially     Caffeine:  She admits to consuming caffeine via coffee and soda.          Substance Abuse History:     Last Reviewed on 2020 10:30 AM by Nadine Evans    None         Mental Health History:     Last Reviewed on 2020 10:30 AM by Nadine Evans    NEGATIVE         Communicable Diseases (eg STDs):     Reportable health conditions;     Chlamydia ( diagnosed 24 years ago )    carrier of strep         Current Problems:     Last Reviewed on 2021 11:19 AM by Patricia Valladares    Nicotine dependence, unspecified, uncomplicated    Essential (primary) hypertension    Pain in left shoulder    Other long term (current) drug therapy    Pain in right knee    Encounter for  immunization    Carbuncle of groin    Attention and concentration deficit        Immunizations:     influenza, injectable, quadrivalent, preservative free (FLUZONE QUAD [id number]) 11/11/2020    Fluzone Quadrivalent (3+ years) 10/1/2019        Allergies:     Last Reviewed on 5/25/2021 10:41 AM by Radha Yun    Augmentin:          Current Medications:     Last Reviewed on 5/25/2021 10:41 AM by Radha Yun    ibuprofen 800 mg oral tablet [take 1 tablet (800 mg) by oral route 3 times per day ]    multivitamin oral capsule [1 daily]    magnesium  [1 daily]    Vitamin D3  [1 daily]    B12  [2500 mg 1 daily]    ascorbic acid (vitamin C) 500 mg oral tablet,chewable [1 daily]    zinc 50 mg oral tablet [1 daily]    lisinopriL 20 mg oral tablet [TAKE 1 TABLET DAILY]    meloxicam 15 mg oral tablet [TAKE 1 TABLET BY MOUTH EVERY DAY]        Objective:        Vitals:         Current: 5/25/2021 10:43:12 AM    Ht:  5 ft, 6 in;  Wt: 202.4 lbs;  BMI: 32.7T: 98.2 F (oral);  BP: 121/63 mm Hg (left arm, sitting);  P: 70 bpm (left arm (BP Cuff), sitting);  sCr: 0.75 mg/dL;  GFR: 102.07        Exams:     PHYSICAL EXAM:     GENERAL: vital signs recorded - well developed, well nourished;  no apparent distress;     NECK: range of motion is normal;     RESPIRATORY: normal appearance and symmetric expansion of chest wall; normal respiratory rate and pattern with no distress; normal breath sounds with no rales, rhonchi, wheezes or rubs;     CARDIOVASCULAR: normal rate; rhythm is regular;  no edema;     LYMPHATIC: no enlargement of cervical or facial nodes; no supraclavicular nodes;     MUSCULOSKELETAL: normal gait; normal range of motion of all major muscle groups; no limb or joint pain with range of motion;     NEUROLOGIC: mental status: alert and oriented x 3;     PSYCHIATRIC: affect/demeanor: anxious, tearful;  normal speech pattern; normal thought and perception;         Assessment:         I10   Essential (primary) hypertension        R41.840   Attention and concentration deficit       F17.200   Nicotine dependence, unspecified, uncomplicated           ORDERS:         Meds Prescribed:       [New Rx] Wellbutrin  mg oral tablet, sustained-release 12 hr [take 1 tablet (150 mg) by oral route 2 times per day], #60 (sixty) tablets, Refills: 3 (three)       [Refilled] lisinopriL 20 mg oral tablet [TAKE 1 TABLET DAILY], #90 (ninety) tablets, Refills: 1 (one)         Lab Orders:       [id number]  [facility] Comp. Metabolic Panel  (Send-Out)              Procedures Ordered:       REFER  Referral to Specialist or Other Facility  (Send-Out)                      Plan:         Essential (primary) hypertensionWill continue current dose and have her follow up in 6 months     LABORATORY:  Labs ordered to be performed today include Comprehensive metabolic panel.            Prescriptions:       [Refilled] lisinopriL 20 mg oral tablet [TAKE 1 TABLET DAILY], #90 (ninety) tablets, Refills: 1 (one)           Orders:       [id number]  [facility] Comp. Metabolic Panel  (Send-Out)              Attention and concentration deficitrefer to mental health for evaluation for possible ADD - adult onset - and recommended treatment         REFERRALS:  Referral initiated to a psychiatrist ( Dr. Faith Enrique [facility] (med mgmt only) ).            Orders:       REFER  Referral to Specialist or Other Facility  (Send-Out)              Nicotine dependence, unspecified, uncomplicatedDiscussed that Wellbutrin may also help with her ADD, but will continue with the referral and Rx for nicotine dependence           Prescriptions:       [New Rx] Wellbutrin  mg oral tablet, sustained-release 12 hr [take 1 tablet (150 mg) by oral route 2 times per day], #60 (sixty) tablets, Refills: 3 (three)             Charge Capture:         Primary Diagnosis:     I10  Essential (primary) hypertension           Orders:      [id number]  Office/outpatient visit; established patient, level 4   (In-House)              R41.840  Attention and concentration deficit     F17.200  Nicotine dependence, unspecified, uncomplicated

## 2021-06-12 DIAGNOSIS — R41.840 CONCENTRATION DEFICIT: Primary | ICD-10-CM

## 2021-07-01 VITALS
HEART RATE: 93 BPM | WEIGHT: 186.6 LBS | BODY MASS INDEX: 29.99 KG/M2 | TEMPERATURE: 98.1 F | HEIGHT: 66 IN | DIASTOLIC BLOOD PRESSURE: 86 MMHG | SYSTOLIC BLOOD PRESSURE: 128 MMHG

## 2021-07-02 VITALS
SYSTOLIC BLOOD PRESSURE: 121 MMHG | HEIGHT: 66 IN | BODY MASS INDEX: 32.53 KG/M2 | TEMPERATURE: 98.2 F | HEART RATE: 70 BPM | DIASTOLIC BLOOD PRESSURE: 63 MMHG | WEIGHT: 202.4 LBS

## 2021-07-02 VITALS
TEMPERATURE: 97.3 F | HEART RATE: 93 BPM | HEIGHT: 66 IN | WEIGHT: 194.6 LBS | BODY MASS INDEX: 31.27 KG/M2 | DIASTOLIC BLOOD PRESSURE: 76 MMHG | SYSTOLIC BLOOD PRESSURE: 128 MMHG

## 2021-07-02 VITALS
WEIGHT: 198.2 LBS | SYSTOLIC BLOOD PRESSURE: 136 MMHG | HEIGHT: 66 IN | HEART RATE: 79 BPM | BODY MASS INDEX: 31.85 KG/M2 | DIASTOLIC BLOOD PRESSURE: 63 MMHG | TEMPERATURE: 96.9 F

## 2021-08-31 RX ORDER — BUPROPION HYDROCHLORIDE 150 MG/1
150 TABLET, EXTENDED RELEASE ORAL 2 TIMES DAILY
Qty: 180 TABLET | Refills: 0 | Status: SHIPPED | OUTPATIENT
Start: 2021-08-31 | End: 2021-11-16

## 2021-08-31 RX ORDER — BUPROPION HYDROCHLORIDE 150 MG/1
1 TABLET, EXTENDED RELEASE ORAL 2 TIMES DAILY
COMMUNITY
Start: 2021-05-25 | End: 2021-08-31 | Stop reason: SDUPTHER

## 2021-11-16 ENCOUNTER — TELEPHONE (OUTPATIENT)
Dept: FAMILY MEDICINE CLINIC | Age: 51
End: 2021-11-16

## 2021-11-16 RX ORDER — BUPROPION HYDROCHLORIDE 150 MG/1
150 TABLET, EXTENDED RELEASE ORAL 2 TIMES DAILY
Qty: 60 TABLET | Refills: 0 | Status: SHIPPED | OUTPATIENT
Start: 2021-11-16 | End: 2021-11-23 | Stop reason: SDUPTHER

## 2021-11-16 NOTE — TELEPHONE ENCOUNTER
HUB UNABLE TO WARM TRANSFER     Caller: Vickie Chen    Relationship: Self    Best call back number: [phone]     What is the best time to reach you: ANY    Who are you requesting to speak with (clinical staff, provider,  specific staff member): CLINICAL    Do you know the name of the person who called: DANIEL    What was the call regarding: PRESCRIPTION    Do you require a callback: YES

## 2021-11-16 NOTE — TELEPHONE ENCOUNTER
Rx Refill Note  Requested Prescriptions     Pending Prescriptions Disp Refills   • buPROPion SR (WELLBUTRIN SR) 150 MG 12 hr tablet [Pharmacy Med Name: BUPROP 12 SR TAB 150MG(W)] 180 tablet 0     Sig: TAKE 1 TABLET TWICE A DAY      Last office visit with prescribing clinician: 5/25/21    Next office visit with prescribing clinician: Visit date not found/ LMTRC NEEDS APPT FOR FURTHER REFILLS         {TIP  Is Refill Pharmacy correct?yes  Patt Baker  11/16/21, 16:01 EST

## 2021-11-17 RX ORDER — BUPROPION HYDROCHLORIDE 150 MG/1
150 TABLET, EXTENDED RELEASE ORAL 2 TIMES DAILY
Qty: 60 TABLET | Refills: 0 | OUTPATIENT
Start: 2021-11-17

## 2021-11-23 ENCOUNTER — OFFICE VISIT (OUTPATIENT)
Dept: FAMILY MEDICINE CLINIC | Age: 51
End: 2021-11-23

## 2021-11-23 VITALS
TEMPERATURE: 97.6 F | HEIGHT: 66 IN | DIASTOLIC BLOOD PRESSURE: 68 MMHG | SYSTOLIC BLOOD PRESSURE: 133 MMHG | HEART RATE: 75 BPM | BODY MASS INDEX: 30.05 KG/M2 | WEIGHT: 187 LBS

## 2021-11-23 DIAGNOSIS — M79.672 FOOT PAIN, BILATERAL: ICD-10-CM

## 2021-11-23 DIAGNOSIS — I10 ESSENTIAL HYPERTENSION: Primary | ICD-10-CM

## 2021-11-23 DIAGNOSIS — L02.93: ICD-10-CM

## 2021-11-23 DIAGNOSIS — M79.671 FOOT PAIN, BILATERAL: ICD-10-CM

## 2021-11-23 DIAGNOSIS — F41.9 ANXIETY: ICD-10-CM

## 2021-11-23 DIAGNOSIS — R41.840 CONCENTRATION DEFICIT: ICD-10-CM

## 2021-11-23 PROCEDURE — 99213 OFFICE O/P EST LOW 20 MIN: CPT | Performed by: NURSE PRACTITIONER

## 2021-11-23 RX ORDER — MELOXICAM 15 MG/1
15 TABLET ORAL DAILY
Qty: 90 TABLET | Refills: 1 | Status: SHIPPED | OUTPATIENT
Start: 2021-11-23 | End: 2022-02-15 | Stop reason: SINTOL

## 2021-11-23 RX ORDER — BUPROPION HYDROCHLORIDE 150 MG/1
150 TABLET, EXTENDED RELEASE ORAL 2 TIMES DAILY
Qty: 180 TABLET | Refills: 1 | Status: SHIPPED | OUTPATIENT
Start: 2021-11-23 | End: 2021-11-30 | Stop reason: DRUGHIGH

## 2021-11-23 RX ORDER — LISINOPRIL 20 MG/1
1 TABLET ORAL DAILY
COMMUNITY
Start: 2021-10-26 | End: 2021-11-23 | Stop reason: SDUPTHER

## 2021-11-23 RX ORDER — LISINOPRIL 20 MG/1
20 TABLET ORAL DAILY
Qty: 90 TABLET | Refills: 1 | Status: SHIPPED | OUTPATIENT
Start: 2021-11-23 | End: 2022-05-23 | Stop reason: SDUPTHER

## 2021-11-23 RX ORDER — DOXYCYCLINE HYCLATE 100 MG/1
100 CAPSULE ORAL 2 TIMES DAILY PRN
Qty: 30 CAPSULE | Refills: 1 | Status: SHIPPED | OUTPATIENT
Start: 2021-11-23

## 2021-11-23 NOTE — PROGRESS NOTES
Chief Complaint  Vickie Chen presents to [facility] for Hypertension (med refill )    Subjective          Vickie presents for follow up on hypertension.  Compliance with medication is reported as good   Check of BP at home reported as well controlled.  No new concerns or problems to report.    Bilateral foot pain - having to take meloxicam daily.  Has changed jobs and shoes and has been helping    Anxiety - off and on due to life stressors.  Taking bupropion and wants to continue.  Did not return call for referral to Dr. Enrique report that still wants evaluation for ADHD (I have provided his number to her today)    Regarding recurrent carbuncle - develops in the groin  Takes doxy PRN working well         Review of Systems      Allergies   Allergen Reactions   • Amoxicillin-Pot Clavulanate Nausea And Vomiting      No past medical history on file.  Current Outpatient Medications   Medication Sig Dispense Refill   • buPROPion SR (WELLBUTRIN SR) 150 MG 12 hr tablet Take 1 tablet by mouth 2 (Two) Times a Day. 180 tablet 1   • lisinopril (PRINIVIL,ZESTRIL) 20 MG tablet Take 1 tablet by mouth Daily. 90 tablet 1   • doxycycline (VIBRAMYCIN) 100 MG capsule Take 1 capsule by mouth 2 (Two) Times a Day As Needed (outbreaks). 30 capsule 1   • meloxicam (MOBIC) 15 MG tablet Take 1 tablet by mouth Daily. 90 tablet 1     No current facility-administered medications for this visit.     No past surgical history on file.   Social History     Tobacco Use   • Smoking status: Current Every Day Smoker     Packs/day: 1.00     Years: 30.00     Pack years: 30.00     Types: Cigarettes   • Smokeless tobacco: Never Used   Substance Use Topics   • Alcohol use: Not on file   • Drug use: Not on file     No family history on file.  Health Maintenance Due   Topic Date Due   • MAMMOGRAM  Never done   • COLORECTAL CANCER SCREENING  Never done   • ANNUAL PHYSICAL  Never done   • Pneumococcal Vaccine 0-64 (1 of 2 -  "PPSV23) Never done   • ZOSTER VACCINE (1 of 2) Never done   • LUNG CANCER SCREENING  Never done   • HEPATITIS C SCREENING  Never done   • PAP SMEAR  Never done      Immunization History   Administered Date(s) Administered   • COVID-19 (PFIZER) 10/17/2021, 11/07/2021   • FluLaval/Fluarix/Fluzone >6 11/02/2016, 11/03/2017, 11/06/2018   • Influenza TIV (IM) 10/31/2012, 10/01/2013   • Influenza, Unspecified 10/23/2009, 10/13/2015   • Tdap 11/03/2017        Objective     Vitals:    11/23/21 0804   BP: 133/68   BP Location: Left arm   Patient Position: Sitting   Pulse: 75   Temp: 97.6 °F (36.4 °C)   Weight: 84.8 kg (187 lb)   Height: 167.6 cm (65.98\")     Body mass index is 30.2 kg/m².     Physical Exam  Constitutional:       General: She is not in acute distress.     Appearance: Normal appearance.   HENT:      Head: Normocephalic.   Cardiovascular:      Rate and Rhythm: Normal rate and regular rhythm.   Pulmonary:      Effort: Pulmonary effort is normal.      Breath sounds: Normal breath sounds.   Musculoskeletal:         General: Normal range of motion.   Neurological:      General: No focal deficit present.      Mental Status: She is alert and oriented to person, place, and time.   Psychiatric:         Mood and Affect: Mood normal.         Behavior: Behavior normal.           Result Review :                               Assessment and Plan      Diagnoses and all orders for this visit:    1. Essential hypertension (Primary)  Comments:  continue current treatment  follow up in 6 months   Assessment & Plan:  Hypertension is unchanged.  Continue current treatment regimen.  Blood pressure will be reassessed at the next regular appointment.    Orders:  -     lisinopril (PRINIVIL,ZESTRIL) 20 MG tablet; Take 1 tablet by mouth Daily.  Dispense: 90 tablet; Refill: 1    2. Anxiety  Comments:  continue current treatment follow up in 6 months   Orders:  -     buPROPion SR (WELLBUTRIN SR) 150 MG 12 hr tablet; Take 1 tablet by mouth " 2 (Two) Times a Day.  Dispense: 180 tablet; Refill: 1    3. Carbuncle of skin or subcutaneous tissue  Comments:  continue doxy PRN and follow up if worsening   Orders:  -     doxycycline (VIBRAMYCIN) 100 MG capsule; Take 1 capsule by mouth 2 (Two) Times a Day As Needed (outbreaks).  Dispense: 30 capsule; Refill: 1    4. Concentration deficit  Comments:  Number provided to Dr. Enrique for her to make an appt at her convenience    5. Foot pain, bilateral  -     meloxicam (MOBIC) 15 MG tablet; Take 1 tablet by mouth Daily.  Dispense: 90 tablet; Refill: 1            Follow Up     Return in about 6 months (around 5/23/2022) for Annual physical, Recheck.

## 2021-11-30 ENCOUNTER — OFFICE VISIT (OUTPATIENT)
Dept: BEHAVIORAL HEALTH | Facility: CLINIC | Age: 51
End: 2021-11-30

## 2021-11-30 VITALS
BODY MASS INDEX: 30.05 KG/M2 | SYSTOLIC BLOOD PRESSURE: 136 MMHG | DIASTOLIC BLOOD PRESSURE: 72 MMHG | HEIGHT: 66 IN | WEIGHT: 187 LBS

## 2021-11-30 DIAGNOSIS — F41.9 INSOMNIA SECONDARY TO ANXIETY: ICD-10-CM

## 2021-11-30 DIAGNOSIS — F51.05 INSOMNIA SECONDARY TO ANXIETY: ICD-10-CM

## 2021-11-30 DIAGNOSIS — R41.840 ATTENTION AND CONCENTRATION DEFICIT: Primary | ICD-10-CM

## 2021-11-30 PROCEDURE — 90792 PSYCH DIAG EVAL W/MED SRVCS: CPT | Performed by: NURSE PRACTITIONER

## 2021-11-30 RX ORDER — DIPHENHYDRAMINE HCL 50 MG/1
50 CAPSULE, LIQUID FILLED ORAL NIGHTLY
COMMUNITY

## 2021-11-30 RX ORDER — CHOLECALCIFEROL (VITAMIN D3) 125 MCG
10 CAPSULE ORAL NIGHTLY
COMMUNITY

## 2021-11-30 RX ORDER — BUPROPION HYDROCHLORIDE 300 MG/1
300 TABLET ORAL EVERY MORNING
Qty: 30 TABLET | Refills: 1 | Status: SHIPPED | OUTPATIENT
Start: 2021-11-30 | End: 2022-02-15 | Stop reason: SINTOL

## 2021-11-30 RX ORDER — DIPHENHYDRAMINE HCL 25 MG
25 CAPSULE ORAL NIGHTLY PRN
COMMUNITY
End: 2022-05-11

## 2021-11-30 NOTE — PATIENT INSTRUCTIONS
1.  Please return to clinic at your next scheduled visit.  Contact the clinic ([phone]) at least 24 hours prior in the event you need to cancel.  2.  Do no harm to yourself or others.    3.  Avoid alcohol and drugs.    4.  Take all medications as prescribed.  Please contact the clinic with any concerns. If you are in need of medication refills, please call the clinic at [phone].    5. Should you want to get in touch with your provider, Dr. Faith Enrique, please utilize [employer] or contact the office ([phone]), and staff will be able to page Dr. Enrique directly.  6.  In the event you have personal crisis, contact the following crisis numbers: Suicide Prevention Hotline 1-790.971.6455; KADIE Helpline 1-121-432-UQBQ; [facility] Emergency Room [phone]; text HELLO to 021698; or 336.     SPECIFIC RECOMMENDATIONS:     1.      Medications discussed at this encounter:                   - Stop taking Wellbutrin  mg  Start taking Wellbutrin  mg by mouth daily in the morning      2.      Psychotherapy recommendations:  ADHD testing, call to set up appointment. Please let me know if you have any problems getting this scheduled.      3.     Return to clinic: 4 weeks       Attention Deficit Hyperactivity Disorder, Adult  Attention deficit hyperactivity disorder (ADHD) is a mental health disorder that starts during childhood (neurodevelopmental disorder). For many people with ADHD, the disorder continues into the adult years. Treatment can help you manage your symptoms.  What are the causes?  The exact cause of ADHD is not known. Most experts believe genetics and environmental factors contribute to ADHD.  What increases the risk?  The following factors may make you more likely to develop this condition:  · Having a family history of ADHD.  · Being male.  · Being born to a mother who smoked or drank alcohol during pregnancy.  · Being exposed to lead or other toxins in the womb or  early in life.  · Being born before 37 weeks of pregnancy (prematurely) or at a low birth weight.  · Having experienced a brain injury.  What are the signs or symptoms?  Symptoms of this condition depend on the type of ADHD. The two main types are inattentive and hyperactive-impulsive. Some people may have symptoms of both types.  Symptoms of the inattentive type include:  · Difficulty paying attention.  · Making careless mistakes.  · Not following instructions.  · Being disorganized.  · Avoiding tasks that require time and attention.  · Losing and forgetting things.  · Being easily distracted.  Symptoms of the hyperactive-impulsive type include:  · Restlessness.  · Talking too much.  · Interrupting.  · Difficulty with:  ? Sitting still.  ? Feeling motivated.  ? Relaxing.  ? Waiting in line or waiting for a turn.  In adults, this condition may lead to certain problems, such as:  · Keeping jobs.  · Performing tasks at work.  · Having stable relationships.  · Being on time or keeping to a schedule.  How is this diagnosed?  This condition is diagnosed based on your current symptoms and your history of symptoms. The diagnosis can be made by a health care provider such as a primary care provider or a mental health care specialist.  Your health care provider may use a symptom checklist or a behavior rating scale to evaluate your symptoms. He or she may also want to talk with people who have observed your behaviors throughout your life.  How is this treated?  This condition can be treated with medicines and behavior therapy. Medicines may be the best option to reduce impulsive behaviors and improve attention. Your health care provider may recommend:  · Stimulant medicines. These are the most common medicines used for adult ADHD. They affect certain chemicals in the brain (neurotransmitters) and improve your ability to control your symptoms.  · A non-stimulant medicine for adult ADHD (atomoxetine). This medicine increases a  neurotransmitter called norepinephrine. It may take weeks to months to see effects from this medicine.  Counseling and behavioral management are also important for treating ADHD. Counseling is often used along with medicine. Your health care provider may suggest:  · Cognitive behavioral therapy (CBT). This type of therapy teaches you to replace negative thoughts and actions with positive thoughts and actions. When used as part of ADHD treatment, this therapy may also include:  ? Coping strategies for organization, time management, impulse control, and stress reduction.  ? Mindfulness and meditation training.  · Behavioral management. You may work with a  who is specially trained to help people with ADHD manage and organize activities and function more effectively.  Follow these instructions at home:  Medicines    · Take over-the-counter and prescription medicines only as told by your health care provider.  · Talk with your health care provider about the possible side effects of your medicines and how to manage them.    Lifestyle    · Do not use drugs.  · Do not drink alcohol if:  ? Your health care provider tells you not to drink.  ? You are pregnant, may be pregnant, or are planning to become pregnant.  · If you drink alcohol:  ? Limit how much you use to:  § 0-1 drink a day for women.  § 0-2 drinks a day for men.  ? Be aware of how much alcohol is in your drink. In the [place] one drink equals one 12 oz bottle of beer (355 mL), one 5 oz glass of wine (148 mL), or one 1½ oz glass of hard liquor (44 mL).  · Get enough sleep.  · Eat a healthy diet.  · Exercise regularly. Exercise can help to reduce stress and anxiety.    General instructions  · Learn as much as you can about adult ADHD, and work closely with your health care providers to find the treatments that work best for you.  · Follow the same schedule each day.  · Use reminder devices like notes, calendars, and phone apps to stay on time and  organized.  · Keep all follow-up visits as told by your health care provider and therapist. This is important.  Where to find more information  A health care provider may be able to recommend resources that are available online or over the phone. You could start with:  · Attention Deficit Disorder Association (ADDA): www.add.org  · National [place] of Mental Health (NIM): www.nimh.nih.gov  Contact a health care provider if:  · Your symptoms continue to cause problems.  · You have side effects from your medicine, such as:  ? Repeated muscle twitches, coughing, or speech outbursts.  ? Sleep problems.  ? Loss of appetite.  ? Dizziness.  ? Unusually fast heartbeat.  ? Stomach pains.  ? Headaches.  · You are struggling with anxiety, depression, or substance abuse.  Get help right away if you:  · Have a severe reaction to a medicine.  If you ever feel like you may hurt yourself or others, or have thoughts about taking your own life, get help right away. You can go to the nearest emergency department or call:  · Your local emergency services (911 in the [place]  · A suicide crisis helpline, such as the National Suicide Prevention Lifeline at 1-822.395.2173. This is open 24 hours a day.  Summary  · ADHD is a mental health disorder that starts during childhood (neurodevelopmental disorder) and often continues into the adult years.  · The exact cause of ADHD is not known. Most experts believe genetics and environmental factors contribute to ADHD.  · There is no cure for ADHD, but treatment with medicine, cognitive behavioral therapy, or behavioral management can help you manage your condition.  This information is not intended to replace advice given to you by your health care provider. Make sure you discuss any questions you have with your health care provider.  Document Revised: 05/11/2020 Document Reviewed: 05/11/2020  Elsevier Patient Education © 2021 [employer]  Bupropion extended-release tablets (Depression/Mood  Disorders)  What is this medicine?  BUPROPION (byoo PROE pee on) is used to treat depression.  This medicine may be used for other purposes; ask your health care provider or pharmacist if you have questions.  COMMON BRAND NAME(S): Aplenzin, Budeprion XL, Forfivo XL, Wellbutrin XL  What should I tell my health care provider before I take this medicine?  They need to know if you have any of these conditions:  · an eating disorder, such as anorexia or bulimia  · bipolar disorder or psychosis  · diabetes or high blood sugar, treated with medication  · glaucoma  · head injury or brain tumor  · heart disease, previous heart attack, or irregular heart beat  · high blood pressure  · kidney or liver disease  · seizures (convulsions)  · suicidal thoughts or a previous suicide attempt  · Tourette's syndrome  · weight loss  · an unusual or allergic reaction to bupropion, other medicines, foods, dyes, or preservatives  · breast-feeding  · pregnant or trying to become pregnant  How should I use this medicine?  Take this medicine by mouth with a glass of water. Follow the directions on the prescription label. You can take it with or without food. If it upsets your stomach, take it with food. Do not crush, chew, or cut these tablets. This medicine is taken once daily at the same time each day. Do not take your medicine more often than directed. Do not stop taking this medicine suddenly except upon the advice of your doctor. Stopping this medicine too quickly may cause serious side effects or your condition may worsen.  A special MedGuide will be given to you by the pharmacist with each prescription and refill. Be sure to read this information carefully each time.  Talk to your pediatrician regarding the use of this medicine in children. Special care may be needed.  Overdosage: If you think you have taken too much of this medicine contact a poison control center or emergency room at once.  NOTE: This medicine is only for you. Do not  share this medicine with others.  What if I miss a dose?  If you miss a dose, skip the missed dose and take your next tablet at the regular time. Do not take double or extra doses.  What may interact with this medicine?  Do not take this medicine with any of the following medications:  · linezolid  · MAOIs like Azilect, Carbex, Eldepryl, Marplan, Nardil, and Parnate  · methylene blue (injected into a vein)  · other medicines that contain bupropion like Zyban  This medicine may also interact with the following medications:  · alcohol  · certain medicines for anxiety or sleep  · certain medicines for blood pressure like metoprolol, propranolol  · certain medicines for depression or psychotic disturbances  · certain medicines for HIV or AIDS like efavirenz, lopinavir, nelfinavir, ritonavir  · certain medicines for irregular heart beat like propafenone, flecainide  · certain medicines for Parkinson's disease like amantadine, levodopa  · certain medicines for seizures like carbamazepine, phenytoin, phenobarbital  · cimetidine  · clopidogrel  · cyclophosphamide  · digoxin  · furazolidone  · isoniazid  · nicotine  · orphenadrine  · procarbazine  · steroid medicines like prednisone or cortisone  · stimulant medicines for attention disorders, weight loss, or to stay awake  · tamoxifen  · theophylline  · thiotepa  · ticlopidine  · tramadol  · warfarin  This list may not describe all possible interactions. Give your health care provider a list of all the medicines, herbs, non-prescription drugs, or dietary supplements you use. Also tell them if you smoke, drink alcohol, or use illegal drugs. Some items may interact with your medicine.  What should I watch for while using this medicine?  Tell your doctor if your symptoms do not get better or if they get worse. Visit your doctor or healthcare provider for regular checks on your progress. Because it may take several weeks to see the full effects of this medicine, it is important  to continue your treatment as prescribed by your doctor.  This medicine may cause serious skin reactions. They can happen weeks to months after starting the medicine. Contact your healthcare provider right away if you notice fevers or flu-like symptoms with a rash. The rash may be red or purple and then turn into blisters or peeling of the skin. Or, you might notice a red rash with swelling of the face, lips or lymph nodes in your neck or under your arms.  Patients and their families should watch out for new or worsening thoughts of suicide or depression. Also watch out for sudden changes in feelings such as feeling anxious, agitated, panicky, irritable, hostile, aggressive, impulsive, severely restless, overly excited and hyperactive, or not being able to sleep. If this happens, especially at the beginning of treatment or after a change in dose, call your healthcare provider.  Avoid alcoholic drinks while taking this medicine. Drinking large amounts of alcoholic beverages, using sleeping or anxiety medicines, or quickly stopping the use of these agents while taking this medicine may increase your risk for a seizure.  Do not drive or use heavy machinery until you know how this medicine affects you. This medicine can impair your ability to perform these tasks.  Do not take this medicine close to bedtime. It may prevent you from sleeping.  Your mouth may get dry. Chewing sugarless gum or sucking hard candy, and drinking plenty of water may help. Contact your doctor if the problem does not go away or is severe.  The tablet shell for some brands of this medicine does not dissolve. This is normal. The tablet shell may appear whole in the stool. This is not a cause for concern.  What side effects may I notice from receiving this medicine?  Side effects that you should report to your doctor or health care professional as soon as possible:  · allergic reactions like skin rash, itching or hives, swelling of the face, lips,  or tongue  · breathing problems  · changes in vision  · confusion  · elevated mood, decreased need for sleep, racing thoughts, impulsive behavior  · fast or irregular heartbeat  · hallucinations, loss of contact with reality  · increased blood pressure  · rash, fever, and swollen lymph nodes  · redness, blistering, peeling or loosening of the skin, including inside the mouth  · seizures  · suicidal thoughts or other mood changes  · unusually weak or tired  · vomiting  Side effects that usually do not require medical attention (report to your doctor or health care professional if they continue or are bothersome):  · constipation  · headache  · loss of appetite  · nausea  · tremors  · weight loss  This list may not describe all possible side effects. Call your doctor for medical advice about side effects. You may report side effects to FDA at 7-135-IMP-9594.  Where should I keep my medicine?  Keep out of the reach of children.  Store at room temperature between 15 and 30 degrees C (59 and 86 degrees F). Throw away any unused medicine after the expiration date.  NOTE: This sheet is a summary. It may not cover all possible information. If you have questions about this medicine, talk to your doctor, pharmacist, or health care provider.  © 2021 Elsevier/Gold Standard (2020-03-12 13:45:31)  Neuropsychological Services    [facility]  Address: [address] [place] [address]  Phone: [phone]  Other location: [address] [place] [address]. [phone]  Website: www.[email or web address]  Services offered: Testing and assessments for adult ADHD, intelligence/adaptive functioning, dementia and cognitive impairment. Also provides counseling and psychotherapy for anxiety, depression, trauma, grief, and more.  Insurance accepted: [employer] [facility] [place], [facility], [email or web address], [employer], [employer], [employer], Medicare, , United  Healthcare/Optum Health, ValueOptions, Medicaid plans (Aetna Better Health, [place] Medicaid, Humana Medicaid, [place], Passport, Wellcare).    [facility]   Address: [address], [place] [id number]  Phone: [phone]  Website: [email or web address]  Services offered: Testing and assessments for adult ADHD, intelligence/adaptive functioning, brain injury, dementia and cognitive impairment. Also provides psychotherapy to individuals, couples, and families for anxiety, depression, adjustment, loss and grief, stress management, emotional distress, and more.  Insurance accepted: Most major insurance companies, including [place], Humana, and Medicare. Not currently participating in United Health Care and do not accept Medicaid.     [facility]  Address: [address], [facility] [address]  Phone: [phone]  Website: www.[employer]  Services offered: Evaluation and testing for adults with emotional difficulties and/or ADHD, functional impairment of the brain in adults/seniors, and more. Also provides individual, group, family and marital therapy, comprehensive hypnosis services, career counseling, and neurobehavioral family therapy for brain-impaired individuals and their families.  Insurance accepted: Humana, [employer], United Health Care, [email or web address], and most commercial insurance. No Medicaid or Medicare.    Evelyn Wilkerson with [facility]  Address: [address], [place] [facility] [address] (office is rear entrance on [address]).  Phone: [phone]  Website: [email or web address]  Services offered: Psychological testing offered and also psychotherapy.   Insurance accepted: Accepts most insurance, including Medicare and Medicaid.
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
## 2022-02-03 ENCOUNTER — TELEPHONE (OUTPATIENT)
Dept: PSYCHIATRY | Facility: CLINIC | Age: 52
End: 2022-02-03

## 2022-02-03 NOTE — TELEPHONE ENCOUNTER
PT stated that when they had their ADHD evaluation the dr stated that they needed to see an actual MD and not an APRN and wants to know if they can switch to you. She sees Mallorie Khan

## 2022-02-15 ENCOUNTER — CLINICAL SUPPORT (OUTPATIENT)
Dept: FAMILY MEDICINE CLINIC | Age: 52
End: 2022-02-15

## 2022-02-15 ENCOUNTER — OFFICE VISIT (OUTPATIENT)
Dept: BEHAVIORAL HEALTH | Facility: CLINIC | Age: 52
End: 2022-02-15

## 2022-02-15 VITALS — DIASTOLIC BLOOD PRESSURE: 73 MMHG | WEIGHT: 187 LBS | SYSTOLIC BLOOD PRESSURE: 125 MMHG | BODY MASS INDEX: 30.18 KG/M2

## 2022-02-15 DIAGNOSIS — F41.1 GENERALIZED ANXIETY DISORDER: ICD-10-CM

## 2022-02-15 DIAGNOSIS — Z79.899 HIGH RISK MEDICATION USE: ICD-10-CM

## 2022-02-15 DIAGNOSIS — F90.0 ADHD (ATTENTION DEFICIT HYPERACTIVITY DISORDER), INATTENTIVE TYPE: Primary | ICD-10-CM

## 2022-02-15 LAB
AMPHET+METHAMPHET UR QL: NEGATIVE
AMPHETAMINES UR QL: NEGATIVE
BARBITURATES UR QL SCN: NEGATIVE
BENZODIAZ UR QL SCN: NEGATIVE
BUPRENORPHINE SERPL-MCNC: NEGATIVE NG/ML
CANNABINOIDS SERPL QL: NEGATIVE
COCAINE UR QL: NEGATIVE
EXPIRATION DATE: NORMAL
Lab: NORMAL
MDMA UR QL SCN: NEGATIVE
METHADONE UR QL SCN: NEGATIVE
OPIATES UR QL: NEGATIVE
OXYCODONE UR QL SCN: NEGATIVE
PCP UR QL SCN: NEGATIVE

## 2022-02-15 PROCEDURE — 99214 OFFICE O/P EST MOD 30 MIN: CPT | Performed by: NURSE PRACTITIONER

## 2022-02-15 PROCEDURE — 80305 DRUG TEST PRSMV DIR OPT OBS: CPT | Performed by: NURSE PRACTITIONER

## 2022-02-15 RX ORDER — BUPROPION HYDROCHLORIDE 150 MG/1
150 TABLET, EXTENDED RELEASE ORAL DAILY
Qty: 90 TABLET | Refills: 1 | Status: SHIPPED | OUTPATIENT
Start: 2022-02-15 | End: 2022-03-15

## 2022-02-15 RX ORDER — BUPROPION HYDROCHLORIDE 150 MG/1
150 TABLET, EXTENDED RELEASE ORAL DAILY
COMMUNITY
Start: 2021-12-09 | End: 2022-02-15 | Stop reason: SDUPTHER

## 2022-02-15 RX ORDER — DEXTROAMPHETAMINE SACCHARATE, AMPHETAMINE ASPARTATE, DEXTROAMPHETAMINE SULFATE AND AMPHETAMINE SULFATE 2.5; 2.5; 2.5; 2.5 MG/1; MG/1; MG/1; MG/1
10 TABLET ORAL DAILY
Qty: 30 TABLET | Refills: 0 | Status: SHIPPED | OUTPATIENT
Start: 2022-02-15 | End: 2022-03-15 | Stop reason: SDUPTHER

## 2022-02-15 NOTE — PATIENT INSTRUCTIONS
1.  Please return to clinic at your next scheduled visit.  Contact the clinic ([phone]) at least 24 hours prior in the event you need to cancel.  2.  Do no harm to yourself or others.    3.  Avoid alcohol and drugs.    4.  Take all medications as prescribed.  Please contact the clinic with any concerns. If you are in need of medication refills, please call the clinic at [phone].    5. Should you want to get in touch with your provider, NIKUNJ Neri, please utilize [employer] or contact the office ([phone]), and staff will be able to page the provider on call directly.  6.  In the event you have personal crisis, contact the following crisis numbers: Suicide Prevention Hotline 1-680.785.3955; [facility] Helpline 2-779-948-RHGS; [facility] Emergency Room [phone]; text HELLO to 287885; or 272.     SPECIFIC RECOMMENDATIONS:     1.      Medications discussed at this encounter:                   - Continue Wellbutrin  mg by mouth daily in the morning  Will Start Adderall IR 5 mg by mouth twice daily, approximately 6 hrs apart. Dr Enrique will be prescribing medication and sending to local pharmacy     2.      Psychotherapy recommendations: n/a     3.     Return to clinic: 4 weeks  4.  Urine drug screen today before starting Adderall      Amphetamine; Dextroamphetamine tablets  What is this medicine?  AMPHETAMINE; DEXTROAMPHETAMINE(am FET a meen; dex troe am FET a meen) is used to treat attention-deficit hyperactivity disorder (ADHD). It may also be used for narcolepsy. Federal law prohibits giving this medicine to any person other than the person for whom it was prescribed. Do not share this medicine with anyone else.  This medicine may be used for other purposes; ask your health care provider or pharmacist if you have questions.  COMMON BRAND NAME(S): Adderall  What should I tell my health care provider before I take this medicine?  They need to know if you have any of these  conditions:  · anxiety or panic attacks  · circulation problems in fingers and toes  · glaucoma  · hardening or blockages of the arteries or heart blood vessels  · heart disease or a heart defect  · high blood pressure  · history of a drug or alcohol abuse problem  · history of stroke  · kidney disease  · liver disease  · mental illness  · seizures  · suicidal thoughts, plans, or attempt; a previous suicide attempt by you or a family member  · thyroid disease  · Tourette's syndrome  · an unusual or allergic reaction to dextroamphetamine, other amphetamines, other medicines, foods, dyes, or preservatives  · pregnant or trying to get pregnant  · breast-feeding  How should I use this medicine?  Take this medicine by mouth with a glass of water. Follow the directions on the prescription label. Take your doses at regular intervals. Do not take your medicine more often than directed. Do not suddenly stop your medicine. You must gradually reduce the dose or you may feel withdrawal effects. Ask your doctor or health care professional for advice.  Talk to your pediatrician regarding the use of this medicine in children. Special care may be needed. While this drug may be prescribed for children as young as 3 years for selected conditions, precautions do apply.  Overdosage: If you think you have taken too much of this medicine contact a poison control center or emergency room at once.  NOTE: This medicine is only for you. Do not share this medicine with others.  What if I miss a dose?  If you miss a dose, take it as soon as you can. If it is almost time for your next dose, take only that dose. Do not take double or extra doses.  What may interact with this medicine?  Do not take this medicine with any of the following medications:  · MAOIs like Carbex, Eldepryl, Marplan, Nardil, and Parnate  · other stimulant medicines for attention disorders  This medicine may also interact with the following  medications:  · acetazolamide  · ammonium chloride  · antacids  · ascorbic acid  · atomoxetine  · caffeine  · certain medicines for blood pressure  · certain medicines for depression, anxiety, or psychotic disturbances  · certain medicines for seizures like carbamazepine, phenobarbital, phenytoin  · certain medicines for stomach problems like cimetidine, ranitidine, famotidine, esomeprazole, omeprazole, lansoprazole, pantoprazole  · lithium  · medicines for colds and breathing difficulties  · medicines for diabetes  · medicines or dietary supplements for weight loss or to stay awake  · methenamine  · narcotic medicines for pain  · quinidine  · ritonavir  · sodium bicarbonate  · Sawgrass's wort  This list may not describe all possible interactions. Give your health care provider a list of all the medicines, herbs, non-prescription drugs, or dietary supplements you use. Also tell them if you smoke, drink alcohol, or use illegal drugs. Some items may interact with your medicine.  What should I watch for while using this medicine?  Visit your doctor or health care professional for regular checks on your progress. This prescription requires that you follow special procedures with your doctor and pharmacy. You will need to have a new written prescription from your doctor every time you need a refill.  This medicine may affect your concentration, or hide signs of tiredness. Until you know how this medicine affects you, do not drive, ride a bicycle, use machinery, or do anything that needs mental alertness.  Tell your doctor or health care professional if this medicine loses its effects, or if you feel you need to take more than the prescribed amount. Do not change the dosage without talking to your doctor or health care professional.  Decreased appetite is a common side effect when starting this medicine. Eating small, frequent meals or snacks can help. Talk to your doctor if you continue to have poor eating habits. Height  and weight growth of a child taking this medicine will be monitored closely.  Do not take this medicine close to bedtime. It may prevent you from sleeping.  If you are going to need surgery, a MRI, CT scan, or other procedure, tell your doctor that you are taking this medicine. You may need to stop taking this medicine before the procedure.  Tell your doctor or healthcare professional right away if you notice unexplained wounds on your fingers and toes while taking this medicine. You should also tell your healthcare provider if you experience numbness or pain, changes in the skin color, or sensitivity to temperature in your fingers or toes.  What side effects may I notice from receiving this medicine?  Side effects that you should report to your doctor or health care professional as soon as possible:  · allergic reactions like skin rash, itching or hives, swelling of the face, lips, or tongue  · anxious  · breathing problems  · changes in emotions or moods  · changes in vision  · chest pain or chest tightness  · fast, irregular heartbeat  · fingers or toes feel numb, cool, painful  · hallucination, loss of contact with reality  · high blood pressure  · males: prolonged or painful erection  · seizures  · signs and symptoms of serotonin syndrome like confusion, increased sweating, fever, tremor, stiff muscles, diarrhea  · signs and symptoms of a stroke like changes in vision; confusion; trouble speaking or understanding; severe headaches; sudden numbness or weakness of the face, arm or leg; trouble walking; dizziness; loss of balance or coordination  · suicidal thoughts or other mood changes  · uncontrollable head, mouth, neck, arm, or leg movements  Side effects that usually do not require medical attention (report to your doctor or health care professional if they continue or are bothersome):  · dry mouth  · headache  · irritability  · loss of appetite  · nausea  · trouble sleeping  · weight loss  This list may not  describe all possible side effects. Call your doctor for medical advice about side effects. You may report side effects to FDA at 4-686-QIX-1358.  Where should I keep my medicine?  Keep out of the reach of children. This medicine can be abused. Keep your medicine in a safe place to protect it from theft. Do not share this medicine with anyone. Selling or giving away this medicine is dangerous and against the law.  Store at room temperature between 15 and 30 degrees C (59 and 86 degrees F). Keep container tightly closed. Throw away any unused medicine after the expiration date. Dispose of properly. This medicine may cause accidental overdose and death if it is taken by other adults, children, or pets. Mix any unused medicine with a substance like cat litter or coffee grounds. Then throw the medicine away in a sealed container like a sealed bag or a coffee can with a lid. Do not use the medicine after the expiration date.  NOTE: This sheet is a summary. It may not cover all possible information. If you have questions about this medicine, talk to your doctor, pharmacist, or health care provider.  © 2021 [employer] (2018-02-09 16:28:23)
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
## 2022-03-15 ENCOUNTER — OFFICE VISIT (OUTPATIENT)
Dept: BEHAVIORAL HEALTH | Facility: CLINIC | Age: 52
End: 2022-03-15

## 2022-03-15 VITALS
HEIGHT: 66 IN | SYSTOLIC BLOOD PRESSURE: 128 MMHG | OXYGEN SATURATION: 100 % | HEART RATE: 68 BPM | DIASTOLIC BLOOD PRESSURE: 80 MMHG | BODY MASS INDEX: 29.86 KG/M2 | WEIGHT: 185.8 LBS

## 2022-03-15 DIAGNOSIS — F90.0 ADHD (ATTENTION DEFICIT HYPERACTIVITY DISORDER), INATTENTIVE TYPE: Primary | ICD-10-CM

## 2022-03-15 DIAGNOSIS — F41.1 GENERALIZED ANXIETY DISORDER: ICD-10-CM

## 2022-03-15 DIAGNOSIS — F90.0 ADHD (ATTENTION DEFICIT HYPERACTIVITY DISORDER), INATTENTIVE TYPE: ICD-10-CM

## 2022-03-15 PROCEDURE — 99213 OFFICE O/P EST LOW 20 MIN: CPT | Performed by: NURSE PRACTITIONER

## 2022-03-15 RX ORDER — DEXTROAMPHETAMINE SACCHARATE, AMPHETAMINE ASPARTATE, DEXTROAMPHETAMINE SULFATE AND AMPHETAMINE SULFATE 2.5; 2.5; 2.5; 2.5 MG/1; MG/1; MG/1; MG/1
10 TABLET ORAL 2 TIMES DAILY
Qty: 60 TABLET | Refills: 0 | Status: SHIPPED | OUTPATIENT
Start: 2022-03-17 | End: 2022-04-13 | Stop reason: SDUPTHER

## 2022-03-15 RX ORDER — BUPROPION HYDROCHLORIDE 150 MG/1
150 TABLET, EXTENDED RELEASE ORAL 2 TIMES DAILY
Qty: 180 TABLET | Refills: 1 | Status: SHIPPED | OUTPATIENT
Start: 2022-03-15 | End: 2022-08-15 | Stop reason: SDUPTHER

## 2022-03-15 RX ORDER — DEXTROAMPHETAMINE SACCHARATE, AMPHETAMINE ASPARTATE, DEXTROAMPHETAMINE SULFATE AND AMPHETAMINE SULFATE 2.5; 2.5; 2.5; 2.5 MG/1; MG/1; MG/1; MG/1
10 TABLET ORAL 2 TIMES DAILY
Qty: 60 TABLET | Refills: 0 | Status: SHIPPED | OUTPATIENT
Start: 2022-03-17 | End: 2022-03-15 | Stop reason: SDUPTHER
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
## 2022-03-15 NOTE — PATIENT INSTRUCTIONS
1.  Please return to clinic at your next scheduled visit.  Contact the clinic ([phone]) at least 24 hours prior in the event you need to cancel.  2.  Do no harm to yourself or others.    3.  Avoid alcohol and drugs.    4.  Take all medications as prescribed.  Please contact the clinic with any concerns. If you are in need of medication refills, please call the clinic at [phone].    5. Should you want to get in touch with your provider, NIKUNJ Neri, please utilize [facility] or contact the office ([phone]), and staff will be able to page the provider on call directly.  6.  In the event you have personal crisis, contact the following crisis numbers: Suicide Prevention Hotline 1-893.373.3047; [facility] Helpline 6-066-436-KTLH; [facility] Emergency Room [phone]; text HELLO to 246190; or 587.     SPECIFIC RECOMMENDATIONS:     1.      Medications discussed at this encounter:                   - Increase Wellbutrin SR from 150 mg by mouth daily to twice daily   Increase Adderall IR from 10 mg by mouth daily to twice daily, take 2nd dose before start of shift for work in early afternoon, at least 5-6 hrs apart.     2.      Psychotherapy recommendations: n/a     3.     Return to clinic: 4 weeks

## 2022-03-17 ENCOUNTER — TELEPHONE (OUTPATIENT)
Dept: FAMILY MEDICINE CLINIC | Age: 52
End: 2022-03-17

## 2022-03-17 DIAGNOSIS — M79.672 FOOT PAIN, BILATERAL: Primary | ICD-10-CM

## 2022-03-17 DIAGNOSIS — M79.671 FOOT PAIN, BILATERAL: Primary | ICD-10-CM

## 2022-03-17 RX ORDER — DICLOFENAC SODIUM 75 MG/1
75 TABLET, DELAYED RELEASE ORAL 2 TIMES DAILY PRN
Qty: 180 TABLET | Refills: 0 | Status: SHIPPED | OUTPATIENT
Start: 2022-03-17 | End: 2022-09-12

## 2022-03-17 NOTE — TELEPHONE ENCOUNTER
Caller: Vickie Chen    Relationship: Self    Best call back number: [phone]     What medication are you requesting: DICLOFENAC SODIUM EC 75 MG / 1 TABLET TWICE A DAY - 90 DAY SUPPLY    What are your current symptoms: ARTHRITIS PAIN    How long have you been experiencing symptoms: 3 YEARS    Have you had these symptoms before:    [x] Yes  [] No    Have you been treated for these symptoms before:   [x] Yes  [] No    If a prescription is needed, what is your preferred pharmacy and phone number: [facility] - [place], [place] - [address] AT [address]AL TO [facility] - [phone] [facility] [phone] FX     Additional notes: PATIENT REPORTS THAT AN ORTHOPEDIC DR HAD PRESCRIBED THIS TO HER A FEW YEARS AGO, AND SHE WOULD LIKE TO GET BACK ON IT.         PATIENT ALSO REPORTS THAT IF WE ARE UNABLE TO DO A 90 DAY SUPPLY - PATIENT WOULD LIKE THE 30 DAY SUPPLY SENT TO [facility]/pharmacy #[id number] - [place], [place] - [address] [phone] [facility] [phone] FX

## 2022-04-13 ENCOUNTER — OFFICE VISIT (OUTPATIENT)
Dept: PSYCHIATRY | Facility: CLINIC | Age: 52
End: 2022-04-13

## 2022-04-13 VITALS
HEIGHT: 66 IN | DIASTOLIC BLOOD PRESSURE: 89 MMHG | SYSTOLIC BLOOD PRESSURE: 137 MMHG | BODY MASS INDEX: 29.89 KG/M2 | WEIGHT: 186 LBS

## 2022-04-13 DIAGNOSIS — F90.0 ADHD (ATTENTION DEFICIT HYPERACTIVITY DISORDER), INATTENTIVE TYPE: Primary | ICD-10-CM

## 2022-04-13 PROCEDURE — 99214 OFFICE O/P EST MOD 30 MIN: CPT | Performed by: NURSE PRACTITIONER

## 2022-04-13 RX ORDER — DEXTROAMPHETAMINE SACCHARATE, AMPHETAMINE ASPARTATE MONOHYDRATE, DEXTROAMPHETAMINE SULFATE AND AMPHETAMINE SULFATE 3.75; 3.75; 3.75; 3.75 MG/1; MG/1; MG/1; MG/1
15 CAPSULE, EXTENDED RELEASE ORAL EVERY MORNING
Qty: 30 CAPSULE | Refills: 0 | Status: SHIPPED | OUTPATIENT
Start: 2022-04-16 | End: 2022-05-11

## 2022-04-13 RX ORDER — DEXTROAMPHETAMINE SACCHARATE, AMPHETAMINE ASPARTATE, DEXTROAMPHETAMINE SULFATE AND AMPHETAMINE SULFATE 2.5; 2.5; 2.5; 2.5 MG/1; MG/1; MG/1; MG/1
10 TABLET ORAL DAILY
Qty: 30 TABLET | Refills: 0 | Status: SHIPPED | OUTPATIENT
Start: 2022-04-16 | End: 2022-05-11 | Stop reason: SDUPTHER

## 2022-04-13 RX ORDER — MULTIPLE VITAMINS W/ MINERALS TAB 9MG-400MCG
1 TAB ORAL DAILY
COMMUNITY

## 2022-04-13 NOTE — PATIENT INSTRUCTIONS
1.  Please return to clinic at your next scheduled visit.  Contact the clinic ([phone]) at least 24 hours prior in the event you need to cancel.  2.  Do no harm to yourself or others.    3.  Avoid alcohol and drugs.    4.  Take all medications as prescribed.  Please contact the clinic with any concerns. If you are in need of medication refills, please call the clinic at [phone].    5. Should you want to get in touch with your provider, NIKUNJ Neri, please utilize [employer] or contact the office ([phone]), and staff will be able to page the provider on call directly.  6.  In the event you have personal crisis, contact the following crisis numbers: Suicide Prevention Hotline 1-674.437.9205; [facility] Helpline 1-149-226-CNIW; [facility] Emergency Room [phone]; text HELLO to 098376; or 193.     SPECIFIC RECOMMENDATIONS:     1.      Medications discussed at this encounter:                   - Change to Adderall XR 15 mg by mouth every morning and Adderall IR 10 mg every afternoon.    Continue Wellbutrin  mg by mouth bid     2.      Psychotherapy recommendations: n/a     3.     Return to clinic: 4 weeks video visit
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
## 2022-05-11 ENCOUNTER — TELEMEDICINE (OUTPATIENT)
Dept: PSYCHIATRY | Facility: CLINIC | Age: 52
End: 2022-05-11

## 2022-05-11 ENCOUNTER — DOCUMENTATION (OUTPATIENT)
Dept: PSYCHIATRY | Facility: CLINIC | Age: 52
End: 2022-05-11

## 2022-05-11 DIAGNOSIS — F90.0 ADHD (ATTENTION DEFICIT HYPERACTIVITY DISORDER), INATTENTIVE TYPE: Primary | ICD-10-CM

## 2022-05-11 PROCEDURE — 99443 PR PHYS/QHP TELEPHONE EVALUATION 21-30 MIN: CPT | Performed by: NURSE PRACTITIONER

## 2022-05-11 RX ORDER — DEXTROAMPHETAMINE SACCHARATE, AMPHETAMINE ASPARTATE, DEXTROAMPHETAMINE SULFATE AND AMPHETAMINE SULFATE 3.75; 3.75; 3.75; 3.75 MG/1; MG/1; MG/1; MG/1
15 TABLET ORAL 2 TIMES DAILY
Qty: 60 TABLET | Refills: 0 | Status: SHIPPED | OUTPATIENT
Start: 2022-05-18 | End: 2022-06-13 | Stop reason: SDUPTHER
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
## 2022-05-11 NOTE — PROGRESS NOTES
ADHD confirmed by neuropsychological testing.  Performed by Delroy.  Date is January 27.  To chart to be scanned into system.

## 2022-05-11 NOTE — PATIENT INSTRUCTIONS
1.  Please return to clinic at your next scheduled visit.  Contact the clinic ([phone]) at least 24 hours prior in the event you need to cancel.  2.  Do no harm to yourself or others.    3.  Avoid alcohol and drugs.    4.  Take all medications as prescribed.  Please contact the clinic with any concerns. If you are in need of medication refills, please call the clinic at [phone].    5. Should you want to get in touch with your provider, NIKUNJ Neri, please utilize [employer] or contact the office ([phone]), and staff will be able to page the provider on call directly.  6.  In the event you have personal crisis, contact the following crisis numbers: Suicide Prevention Hotline 1-247.111.3668; [facility] Helpline 3-930-807-WOPP; [facility] [phone]; text HELLO to 965023; or 923.  7.  Please feel free to contact my Medical Assistant, Manjula, directly at [phone], please leave a voice mail if you do not get an answer, and she will return your call within 24 hrs.      SPECIFIC RECOMMENDATIONS:     1.      Medications discussed at this encounter:                   -  Change to Adderrall IR 15 mg twice daily next week, will not be able to fill until 5/18/22.      2.      Psychotherapy recommendations: n/a    3.     Return to clinic: 5 weeks

## 2022-05-23 ENCOUNTER — OFFICE VISIT (OUTPATIENT)
Dept: FAMILY MEDICINE CLINIC | Age: 52
End: 2022-05-23

## 2022-05-23 ENCOUNTER — LAB (OUTPATIENT)
Dept: LAB | Facility: HOSPITAL | Age: 52
End: 2022-05-23

## 2022-05-23 VITALS
WEIGHT: 184.6 LBS | OXYGEN SATURATION: 98 % | HEART RATE: 78 BPM | DIASTOLIC BLOOD PRESSURE: 85 MMHG | HEIGHT: 66 IN | SYSTOLIC BLOOD PRESSURE: 126 MMHG | BODY MASS INDEX: 29.67 KG/M2

## 2022-05-23 DIAGNOSIS — Z13.6 SCREENING FOR CARDIOVASCULAR CONDITION: ICD-10-CM

## 2022-05-23 DIAGNOSIS — I10 ESSENTIAL HYPERTENSION: ICD-10-CM

## 2022-05-23 DIAGNOSIS — F17.210 CIGARETTE NICOTINE DEPENDENCE WITHOUT COMPLICATION: ICD-10-CM

## 2022-05-23 DIAGNOSIS — Z00.00 ROUTINE GENERAL MEDICAL EXAMINATION AT A HEALTH CARE FACILITY: Primary | ICD-10-CM

## 2022-05-23 LAB
ALBUMIN SERPL-MCNC: 4.4 G/DL (ref 3.5–5.2)
ALBUMIN/GLOB SERPL: 2 G/DL
ALP SERPL-CCNC: 67 U/L (ref 39–117)
ALT SERPL W P-5'-P-CCNC: 20 U/L (ref 1–33)
ANION GAP SERPL CALCULATED.3IONS-SCNC: 9 MMOL/L (ref 5–15)
AST SERPL-CCNC: 21 U/L (ref 1–32)
BILIRUB SERPL-MCNC: 0.2 MG/DL (ref 0–1.2)
BUN SERPL-MCNC: 13 MG/DL (ref 6–20)
BUN/CREAT SERPL: 16 (ref 7–25)
CALCIUM SPEC-SCNC: 9.1 MG/DL (ref 8.6–10.5)
CHLORIDE SERPL-SCNC: 102 MMOL/L (ref 98–107)
CHOLEST SERPL-MCNC: 183 MG/DL (ref 0–200)
CO2 SERPL-SCNC: 26 MMOL/L (ref 22–29)
CREAT SERPL-MCNC: 0.81 MG/DL (ref 0.57–1)
EGFRCR SERPLBLD CKD-EPI 2021: 87.5 ML/MIN/1.73
GLOBULIN UR ELPH-MCNC: 2.2 GM/DL
GLUCOSE SERPL-MCNC: 84 MG/DL (ref 65–99)
HDLC SERPL-MCNC: 44 MG/DL (ref 40–60)
LDLC SERPL CALC-MCNC: 112 MG/DL (ref 0–100)
LDLC/HDLC SERPL: 2.46 {RATIO}
POTASSIUM SERPL-SCNC: 4.2 MMOL/L (ref 3.5–5.2)
PROT SERPL-MCNC: 6.6 G/DL (ref 6–8.5)
SODIUM SERPL-SCNC: 137 MMOL/L (ref 136–145)
TRIGL SERPL-MCNC: 154 MG/DL (ref 0–150)
VLDLC SERPL-MCNC: 27 MG/DL (ref 5–40)

## 2022-05-23 PROCEDURE — 36415 COLL VENOUS BLD VENIPUNCTURE: CPT

## 2022-05-23 PROCEDURE — 99213 OFFICE O/P EST LOW 20 MIN: CPT | Performed by: NURSE PRACTITIONER

## 2022-05-23 PROCEDURE — 99396 PREV VISIT EST AGE 40-64: CPT | Performed by: NURSE PRACTITIONER

## 2022-05-23 PROCEDURE — 80061 LIPID PANEL: CPT

## 2022-05-23 PROCEDURE — 80053 COMPREHEN METABOLIC PANEL: CPT

## 2022-05-23 RX ORDER — LISINOPRIL 20 MG/1
20 TABLET ORAL DAILY
Qty: 90 TABLET | Refills: 3 | Status: SHIPPED | OUTPATIENT
Start: 2022-05-23

## 2022-05-23 NOTE — PROGRESS NOTES
Assessment and Plan    Diagnoses and all orders for this visit:    1. Routine general medical examination at a health care facility ([facility])  Comments:  Annual exam, continue diet and exercise, health screening recommendations discussed declined as noted-she will consider Cologuard once checking with insurance    2. Essential hypertension  Comments:  continue current treatment   Assessment & Plan:  Hypertension is unchanged.  Continue current treatment regimen.  Dietary sodium restriction.  Weight loss.  Blood pressure will be reassessed at the next regular appointment.    Orders:  -     lisinopril (PRINIVIL,ZESTRIL) 20 MG tablet; Take 1 tablet by mouth Daily.  Dispense: 90 tablet; Refill: 3    3. Screening for cardiovascular condition  -     Comprehensive metabolic panel; Future  -     Lipid panel; Future    4. Cigarette nicotine dependence without complication  Comments:  Smoking cessation recommended      Follow Up   Return in about 1 year (around 5/23/2023) for Annual physical.    Chief Complaint  Vickie Chen presents to [facility] for Annual Exam and Hypertension    Subjective          Vickie is here today for annual exam.       Last annual exam was 1 year  Last eye exam: 1 year  PROVIDER:  Dr. Hayes  Last dental exam:   1 months    PROVIDER:  Selena    Last menstrual period:  n/a      DECLINES the following health maintenance recommendations:   covid booster, colon screening, mammogram, pelvic exam, shingle vaccine (pending insurance )    Diet / exercise:   None      Patient's Body mass index is 29.81 kg/m². indicating that she is overweight (BMI 25-29.9). Patient's (Body mass index is 29.81 kg/m².) indicates that they are overweight with health conditions that include hypertension . Weight is unchanged. BMI is is above average; BMI management plan is completed. We discussed low calorie, low carb based diet program, portion control and increasing exercise. .    Patient  "Care Team:  Patricia Valladares APRN as PCP - General (Nurse Practitioner)  Mallorie Khan APRN as Nurse Practitioner (Nurse Practitioner)       Vickie presents for follow up on hypertension.  Compliance with medication is reported as good   Check of BP at home reported as well controlled.  No new concerns or problems to report.  Taking lisinopril as prescribed        Review of Systems    Objective     Vitals:    05/23/22 0925   BP: 126/85   Pulse: 78   SpO2: 98%   Weight: 83.7 kg (184 lb 9.6 oz)   Height: 167.6 cm (65.98\")     Body mass index is 29.81 kg/m².     Physical Exam  Constitutional:       General: She is not in acute distress.     Appearance: Normal appearance.   HENT:      Head: Normocephalic.   Cardiovascular:      Rate and Rhythm: Normal rate and regular rhythm.   Pulmonary:      Effort: Pulmonary effort is normal.      Breath sounds: Normal breath sounds.   Musculoskeletal:         General: Normal range of motion.   Neurological:      General: No focal deficit present.      Mental Status: She is alert and oriented to person, place, and time.   Psychiatric:         Mood and Affect: Mood normal.         Behavior: Behavior normal.         Result Review :                    Allergies   Allergen Reactions   • Amoxicillin-Pot Clavulanate Nausea And Vomiting      Past Medical History:   Diagnosis Date   • Anxiety      Current Outpatient Medications   Medication Sig Dispense Refill   • amphetamine-dextroamphetamine (Adderall) 15 MG tablet Take 1 tablet by mouth 2 (Two) Times a Day. 60 tablet 0   • Ascorbic Acid (VITAMIN C PO) Take  by mouth.     • buPROPion SR (WELLBUTRIN SR) 150 MG 12 hr tablet Take 1 tablet by mouth 2 (Two) Times a Day for 180 days. Indications: Attention Deficit Hyperactivity Disorder 180 tablet 1   • diclofenac (VOLTAREN) 75 MG EC tablet Take 1 tablet by mouth 2 (Two) Times a Day As Needed (arthritis pain). 180 tablet 0   • diphenhydrAMINE HCl, Sleep, (Sleep Aid) 50 MG capsule Take 50 mg by " mouth Every Night. Uncertain of name, OTC sleep aide     • doxycycline (VIBRAMYCIN) 100 MG capsule Take 1 capsule by mouth 2 (Two) Times a Day As Needed (outbreaks). 30 capsule 1   • ELDERBERRY PO Take  by mouth.     • levOCARNitine (CARNITINE PO) Take  by mouth.     • lisinopril (PRINIVIL,ZESTRIL) 20 MG tablet Take 1 tablet by mouth Daily. 90 tablet 3   • melatonin 5 MG tablet tablet Take 10 mg by mouth Every Night.     • Multiple Vitamins-Minerals (ZINC PO) Take  by mouth.     • multivitamin with minerals tablet tablet Take 1 tablet by mouth Daily.       No current facility-administered medications for this visit.     History reviewed. No pertinent surgical history.   Social History     Tobacco Use   • Smoking status: Current Every Day Smoker     Packs/day: 0.50     Years: 36.00     Pack years: 18.00     Types: Cigarettes     Start date: 1986   • Smokeless tobacco: Never Used   Vaping Use   • Vaping Use: Some days   • Substances: Nicotine, Flavoring   • Devices: Disposable, Pre-filled or refillable cartridge, Pre-filled pod   Substance Use Topics   • Alcohol use: Yes     Comment: occassionally   • Drug use: Never     Family History   Problem Relation Age of Onset   • ADD / ADHD Son      Health Maintenance Due   Topic Date Due   • HEPATITIS C SCREENING  Never done      Immunization History   Administered Date(s) Administered   • COVID-19 (PFIZER) PURPLE CAP 10/17/2021, 11/07/2021   • FluLaval/Fluarix/Fluzone >6 11/02/2016, 11/03/2017, 11/06/2018   • Influenza TIV (IM) 10/31/2012, 10/01/2013   • Influenza, Unspecified 10/23/2009, 10/13/2015   • Tdap 11/03/2017

## 2022-06-13 ENCOUNTER — TELEMEDICINE (OUTPATIENT)
Dept: BEHAVIORAL HEALTH | Facility: CLINIC | Age: 52
End: 2022-06-13

## 2022-06-13 DIAGNOSIS — F90.0 ADHD (ATTENTION DEFICIT HYPERACTIVITY DISORDER), INATTENTIVE TYPE: ICD-10-CM

## 2022-06-13 DIAGNOSIS — F90.0 ADHD (ATTENTION DEFICIT HYPERACTIVITY DISORDER), INATTENTIVE TYPE: Primary | ICD-10-CM

## 2022-06-13 PROCEDURE — 99213 OFFICE O/P EST LOW 20 MIN: CPT | Performed by: NURSE PRACTITIONER

## 2022-06-13 RX ORDER — DEXTROAMPHETAMINE SACCHARATE, AMPHETAMINE ASPARTATE, DEXTROAMPHETAMINE SULFATE AND AMPHETAMINE SULFATE 3.75; 3.75; 3.75; 3.75 MG/1; MG/1; MG/1; MG/1
15 TABLET ORAL 2 TIMES DAILY
Qty: 60 TABLET | Refills: 0 | Status: SHIPPED | OUTPATIENT
Start: 2022-06-18 | End: 2022-07-11 | Stop reason: SDUPTHER

## 2022-06-13 NOTE — PATIENT INSTRUCTIONS
1.  Please return to clinic at your next scheduled visit.  Contact the clinic ([phone]) at least 24 hours prior in the event you need to cancel.  2.  Do no harm to yourself or others.    3.  Avoid alcohol and drugs.    4.  Take all medications as prescribed.  Please contact the clinic with any concerns. If you are in need of medication refills, please call the clinic at [phone].    5. Should you want to get in touch with your provider, NIKUNJ Neri, please utilize [employer] or contact the office ([phone]), and staff will be able to page the provider on call directly.  6.  In the event you have personal crisis, contact the following crisis numbers: Suicide Prevention Hotline 1-519.319.4140; [facility] Helpline 2-224-715-GUZI; [facility] [phone]; text HELLO to 430545; or 247.  7.  Please feel free to contact my Medical Assistant, Manjula, directly at [phone], please leave a voice mail if you do not get an answer, and she will return your call within 24 hrs.      SPECIFIC RECOMMENDATIONS:     1.      Medications discussed at this encounter:                   - Continue Adderall IR 15 mg by mouth twice daily and Wellbutrin  mg by mouth twice daily      2.      Psychotherapy recommendations: declined     3.     Return to clinic: 4 weeks, Please arrive at least 10 minutes before your scheduled appointment time to complete check in process.

## 2022-06-13 NOTE — PROGRESS NOTES
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 having SI/HI. No medication side effects or related complaints today.     MEDS ORDERED DURING VISIT:  No orders of the defined types were placed in this encounter.      Return in about 4 weeks (around 7/11/2022) for Video visit.            I spent 24 minutes caring for Vickie on this date of service. This time includes time spent by me in the following activities: preparing for the visit, reviewing tests, obtaining and/or reviewing a separately obtained history, performing a medically appropriate examination and/or evaluation, counseling and educating the patient/family/caregiver, referring and communicating with other health care professionals, documenting information in the medical record and care coordination     This document has been electronically signed by NIKUNJ Neri  June 13, 2022 08:51 EDT      Part of this note may be an electronic transcription/translation of spoken language to printed text using the Dragon Dictation System.

## 2022-07-11 ENCOUNTER — TELEPHONE (OUTPATIENT)
Dept: BEHAVIORAL HEALTH | Facility: CLINIC | Age: 52
End: 2022-07-11

## 2022-07-11 ENCOUNTER — TELEMEDICINE (OUTPATIENT)
Dept: BEHAVIORAL HEALTH | Facility: CLINIC | Age: 52
End: 2022-07-11

## 2022-07-11 DIAGNOSIS — F90.0 ADHD (ATTENTION DEFICIT HYPERACTIVITY DISORDER), INATTENTIVE TYPE: ICD-10-CM

## 2022-07-11 DIAGNOSIS — F90.0 ADHD (ATTENTION DEFICIT HYPERACTIVITY DISORDER), INATTENTIVE TYPE: Primary | ICD-10-CM

## 2022-07-11 PROCEDURE — 99213 OFFICE O/P EST LOW 20 MIN: CPT | Performed by: NURSE PRACTITIONER

## 2022-07-11 RX ORDER — DEXTROAMPHETAMINE SACCHARATE, AMPHETAMINE ASPARTATE, DEXTROAMPHETAMINE SULFATE AND AMPHETAMINE SULFATE 3.75; 3.75; 3.75; 3.75 MG/1; MG/1; MG/1; MG/1
15 TABLET ORAL 2 TIMES DAILY
Qty: 60 TABLET | Refills: 0 | Status: SHIPPED | OUTPATIENT
Start: 2022-07-18 | End: 2022-08-15 | Stop reason: DRUGHIGH

## 2022-07-11 NOTE — PATIENT INSTRUCTIONS
1.  Please return to clinic at your next scheduled visit.  Contact the clinic ([phone]) at least 24 hours prior in the event you need to cancel.  2.  Do no harm to yourself or others.    3.  Avoid alcohol and drugs.    4.  Take all medications as prescribed.  Please contact the clinic with any concerns. If you are in need of medication refills, please call the clinic at [phone].    5. Should you want to get in touch with your provider, NIKUNJ Neri, please utilize [employer] or contact the office ([phone]), and staff will be able to page the provider on call directly.  6.  In the event you have personal crisis, contact the following crisis numbers: Suicide Prevention Hotline 1-855.961.8771; [facility] Helpline 5-804-188-LRGG; [facility] Emergency Room [phone]; text HELLO to 079239; or 071.  7.  Please feel free to contact my Medical Assistant, Manjula, directly at [phone], please leave a voice mail if you do not get an answer, and she will return your call within 24 hrs.      SPECIFIC RECOMMENDATIONS:     1.      Medications discussed at this encounter:                   - continue current medications      2.      Psychotherapy recommendations: Declined     3.     Return to clinic: 5 weeks    Please arrive at least 15 minutes before your scheduled appointment time to complete check in process.

## 2022-07-11 NOTE — PROGRESS NOTES
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 attention & concentration deficit.    ADHD testing referral to Al Cole with [facility] in [place], [place] as patient prefers to see provider closer to home.  List given of providers (4) for testing, patient to contact to schedule appointment.        Patient screened positive for depression based on a PHQ-9 score of 6 on 5/11/22. Follow-up recommendations include: Prescribed antidepressant medication treatment and Suicide Risk Assessment performed.       TREATMENT PLAN/GOALS: Continue supportive psychotherapy efforts and medications as indicated. Treatment and medication options discussed during today's visit. Patient acknowledged and verbally consented to continue with current treatment plan and was educated on the importance of compliance with treatment and follow-up appointments.    MEDICATION ISSUES:  JOVITA reviewed as expected.  Discussed medication options and treatment plan of prescribed medication as well as the risks, benefits, and side effects including potential falls, possible impaired driving and metabolic adversities among others. Patient is agreeable to call the office with any worsening of symptoms or onset of side effects. Patient is agreeable to call 911 or go to the nearest ER should he/she begin having SI/HI. No medication side effects or related  complaints today.     MEDS ORDERED DURING VISIT:  No orders of the defined types were placed in this encounter.      Return in about 5 weeks (around 8/15/2022) for Video visit.            I spent 21 minutes caring for Vickie on this date of service. This time includes time spent by me in the following activities: preparing for the visit, performing a medically appropriate examination and/or evaluation, counseling and educating the patient/family/caregiver, referring and communicating with other health care professionals, documenting information in the medical record and care coordination     This document has been electronically signed by NIKUNJ Neri  July 11, 2022 08:18 EDT      Part of this note may be an electronic transcription/translation of spoken language to printed text using the Dragon Dictation System.

## 2022-07-11 NOTE — TELEPHONE ENCOUNTER
Called and LMVM for patient to call back and schedule 5 week follow up around August 15,22  
Detail Level: Detailed

## 2022-08-15 ENCOUNTER — TELEMEDICINE (OUTPATIENT)
Dept: BEHAVIORAL HEALTH | Facility: CLINIC | Age: 52
End: 2022-08-15

## 2022-08-15 DIAGNOSIS — F90.0 ADHD (ATTENTION DEFICIT HYPERACTIVITY DISORDER), INATTENTIVE TYPE: Primary | ICD-10-CM

## 2022-08-15 DIAGNOSIS — F90.0 ADHD (ATTENTION DEFICIT HYPERACTIVITY DISORDER), INATTENTIVE TYPE: ICD-10-CM

## 2022-08-15 PROCEDURE — 99213 OFFICE O/P EST LOW 20 MIN: CPT | Performed by: NURSE PRACTITIONER

## 2022-08-15 RX ORDER — DEXTROAMPHETAMINE SACCHARATE, AMPHETAMINE ASPARTATE, DEXTROAMPHETAMINE SULFATE AND AMPHETAMINE SULFATE 5; 5; 5; 5 MG/1; MG/1; MG/1; MG/1
20 TABLET ORAL 2 TIMES DAILY
Qty: 60 TABLET | Refills: 0 | Status: SHIPPED | OUTPATIENT
Start: 2022-08-17 | End: 2022-09-16 | Stop reason: SDUPTHER

## 2022-08-15 RX ORDER — AMOXICILLIN 500 MG/1
TABLET, FILM COATED ORAL
COMMUNITY
Start: 2022-08-06 | End: 2022-09-16

## 2022-08-15 RX ORDER — BUPROPION HYDROCHLORIDE 150 MG/1
150 TABLET, EXTENDED RELEASE ORAL 2 TIMES DAILY
Qty: 180 TABLET | Refills: 2 | Status: SHIPPED | OUTPATIENT
Start: 2022-08-15 | End: 2023-05-12

## 2022-08-15 RX ORDER — FLUCONAZOLE 100 MG/1
TABLET ORAL
COMMUNITY
Start: 2022-08-04

## 2022-08-15 NOTE — PATIENT INSTRUCTIONS
1.  Please return to clinic at your next scheduled visit.  Contact the clinic ([phone]) at least 24 hours prior in the event you need to cancel.  2.  Do no harm to yourself or others.    3.  Avoid alcohol and drugs.    4.  Take all medications as prescribed.  Please contact the clinic with any concerns. If you are in need of medication refills, please call the clinic at [phone].    5. Should you want to get in touch with your provider, NIKUNJ Neri, please utilize [employer] or contact the office ([phone]), and staff will be able to page the provider on call directly.  6.  In the event you have personal crisis, contact the following crisis numbers: Suicide Prevention Hotline 1-566.275.3021; [facility] Helpline 7-766-683-SUFN; [facility] Emergency Room [phone]; text HELLO to 884881; or 121.  7.  Please feel free to contact my Medical Assistant, Manjula, directly at [phone], please leave a voice mail if you do not get an answer, and she will return your call within 24 hrs.      SPECIFIC RECOMMENDATIONS:     1.      Medications discussed at this encounter:                   - Refilled Wellbutrin  Increase Adderall IR from 15 to 20 mg by mouth twice daily- will order and send to  with first day for  8/17/22.      2.      Psychotherapy recommendations: Declined     3.     Return to clinic: 4 weeks approximately 9/15 or 9/16 but before 9/17/22.     Please arrive at least 15 minutes before your scheduled appointment time to complete check in process.

## 2022-08-15 NOTE — PROGRESS NOTES
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 attention & concentration deficit.      Increase Adderall IR 10 mg by mouth from once daily to twice daily to target attention and concentration deficit, take one in the morning and the 2nd dose in afternoon approximately 6 hr apart.    Denies side effects of medication. Message sent to Dr. Enrique regarding an update of patient symptoms and plan.     2/15/22:   -POC UDS  -CSA signed and reviewed with patient   -Continue Wellbutrin  mg by mouth daily to target anxiety, attention & concentration deficit.    -Adderall IR 5 mg by mouth twice daily to target attention and concentration deficit, take one in the morning and the 2nd dose in afternoon approximately 6 hr apart.  Risks, benefits, side effects discussed with patient including elevated heart rate, elevated blood pressure, irritability, insomnia, sexual dysfunction, appetite suppressing properties, psychosis.  After discussion of these risks and benefits, the patient voiced understanding and agreed to proceed. UDS ordered, Ryan reviewed. Informed patient medication would not be ordered until UDS results received, and would be ordered per Dr. Enrique.   Adderall IR 10 mg by mouth daily was ordered per  on 2/15/22      11/30/22:   Change Wellbutrin  mg twice daily to Wellbutrin  mg by mouth daily in the am to target anxiety, attention &  concentration deficit.    ADHD testing referral to Al Cole with [facility] in [place], [place] as patient prefers to see provider closer to home.  List given of providers (4) for testing, patient to contact to schedule appointment.        Patient screened positive for depression based on a PHQ-9 score of 6 on 8/15/22. Follow-up recommendations include: Prescribed antidepressant medication treatment and Suicide Risk Assessment performed.       TREATMENT PLAN/GOALS: Continue supportive psychotherapy efforts and medications as indicated. Treatment and medication options discussed during today's visit. Patient acknowledged and verbally consented to continue with current treatment plan and was educated on the importance of compliance with treatment and follow-up appointments.    MEDICATION ISSUES:  JOVITA reviewed as expected.  Discussed medication options and treatment plan of prescribed medication as well as the risks, benefits, and side effects including potential falls, possible impaired driving and metabolic adversities among others. Patient is agreeable to call the office with any worsening of symptoms or onset of side effects. Patient is agreeable to call 911 or go to the nearest ER should he/she begin having SI/HI. No medication side effects or related complaints today.     MEDS ORDERED DURING VISIT:  New Medications Ordered This Visit   Medications   • buPROPion SR (WELLBUTRIN SR) 150 MG 12 hr tablet     Sig: Take 1 tablet by mouth 2 (Two) Times a Day for 270 days. Indications: Attention Deficit Hyperactivity Disorder     Dispense:  180 tablet     Refill:  2       Return in about 1 month (around 9/16/2022) for Video visit.          I spent 23 minutes caring for Vickie on this date of service. This time includes time spent by me in the following activities: preparing for the visit, performing a medically appropriate examination and/or evaluation, counseling and educating the patient/family/caregiver,  ordering medications, tests, or procedures, referring and communicating with other health care professionals, documenting information in the medical record and care coordination     This document has been electronically signed by NIKUNJ Neri  August 15, 2022 08:50 EDT      Part of this note may be an electronic transcription/translation of spoken language to printed text using the Dragon Dictation System.

## 2022-09-12 DIAGNOSIS — M79.672 FOOT PAIN, BILATERAL: ICD-10-CM

## 2022-09-12 DIAGNOSIS — M79.671 FOOT PAIN, BILATERAL: ICD-10-CM

## 2022-09-12 RX ORDER — DICLOFENAC SODIUM 75 MG/1
TABLET, DELAYED RELEASE ORAL
Qty: 180 TABLET | Refills: 0 | Status: SHIPPED | OUTPATIENT
Start: 2022-09-12

## 2022-09-16 ENCOUNTER — TELEMEDICINE (OUTPATIENT)
Dept: BEHAVIORAL HEALTH | Facility: CLINIC | Age: 52
End: 2022-09-16

## 2022-09-16 DIAGNOSIS — F90.0 ADHD (ATTENTION DEFICIT HYPERACTIVITY DISORDER), INATTENTIVE TYPE: Primary | ICD-10-CM

## 2022-09-16 DIAGNOSIS — F90.0 ADHD (ATTENTION DEFICIT HYPERACTIVITY DISORDER), INATTENTIVE TYPE: ICD-10-CM

## 2022-09-16 PROCEDURE — 99212 OFFICE O/P EST SF 10 MIN: CPT | Performed by: NURSE PRACTITIONER

## 2022-09-16 RX ORDER — MELOXICAM 15 MG/1
TABLET ORAL
COMMUNITY
Start: 2022-09-12

## 2022-09-16 RX ORDER — DEXTROAMPHETAMINE SACCHARATE, AMPHETAMINE ASPARTATE, DEXTROAMPHETAMINE SULFATE AND AMPHETAMINE SULFATE 5; 5; 5; 5 MG/1; MG/1; MG/1; MG/1
20 TABLET ORAL 2 TIMES DAILY
Qty: 60 TABLET | Refills: 0 | Status: SHIPPED | OUTPATIENT
Start: 2022-09-16 | End: 2022-10-16

## 2022-09-16 NOTE — PATIENT INSTRUCTIONS
"1.  Please return to clinic at your next scheduled visit.  Contact the [facility] ([phone]) or **Manjula, Medical Assistant at [place] Office directly at [phone] at least 24 hours prior in the event you need to cancel.**    2. Should you want to get in touch with your provider, NIKUNJ Neri, please contact MY Medical Assistant, Manjula, directly at [phone].  Recommend saving Manjula's direct number in phone as this is the PREFERRED & EASIEST way to get in contact with your provider.  Please leave a voice mail if you do not get an answer and she will return your call within 24 hrs. You will NOT be able to contact provider on [facility], as Behavioral Health Providers are restricted. YOU MUST CALL [phone]    If you need to speak with the on call provider after hours or on weekends, please Contact the [facility] ([phone]) and staff will be able to page the provider on call directly.        3, MEDICATION REFILLS:  PLEASE CALL THE PHARMACY TO REQUEST ALL MEDICATION REFILLS TO ENSURE YOU ARE RECEIVING YOUR MEDICATIONS IN A TIMELY MANNER.    IF YOU USE AN AUTOMATED SERVICE AT THE PHARMACY FOR REFILLS AND ARE TOLD THERE ARE \"NO REFILLS REMAINING\"   PLEASE CALL THE PHARMACY & SPEAK TO A LIVE PERSON TO VERIFY IT IS THE MOST UP TO DATE PRESCRIPTION ON FILE.    All new prescriptions will have a different number, therefore, if you were given refills for a medication today or at last visit it will not have the same number as the previous prescription.       4.  In the event you have personal crisis, contact the following crisis numbers: Suicide Prevention Hotline 1-500.657.5686 or *988, [facility]AD[facility] Helpline 3-528-489-KADIE; [facility] Emergency Room [phone]; text HELLO to 014679; or 953.      5. We would appreciate your feedback, please scan the QRS code on the back of your appointment card (or see below) and complete a brief survey.  [place] location is still not available, so " "please click \"[place]\" location.  Thank you      SPECIFIC RECOMMENDATIONS:     1.      Medications discussed at this encounter:                   - Continue current medications      2.      Psychotherapy recommendations: Declined     3.     Return to clinic: 5 weeks    Please arrive at least 15 minutes before your scheduled appointment time to complete check in process.      IF you are scheduled for a [facility] VIDEO visit, PLEASE ANSWER YOUR PHONE WHEN OFFICE CALLS PRIOR TO VISIT TO COMPLETE THE CHECK IN PROCESS, EVEN IF THE E-CHECK IN WAS COMPLETED.     If you would like to log on to [employer] and complete the \"E-Check IN\" prior to your visit, please do so, this will speed up the check in process.  If you are due for questionnaires, you will find those on [employer] as well, please try to complete prior to your scheduled appointment.          "

## 2022-09-16 NOTE — PROGRESS NOTES
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 concentration deficit.      Will stop Adderall XR 15 mg due to hot flash sensation and ineffectiveness.   Will plan on changing Adderall IR 10 mg by mouth every afternoon to Adderall IR 15 mg by mouth twice daily to target attention and concentration deficit.  Last IR dispensed 4/18/22, will send message to Dr. Enrique regarding recommendations and symptoms, patient informed medication would not be available for  until 5/18/22, patient verbalized understanding.       4/13/22:  Continue Wellbutrin  mg by mouth  twice daily to target anxiety, attention & concentration deficit.      Start Adderall XR 15 mg by mouth daily every morning and continue Adderall IR 10 mg by mouth every afternoon to target attention and concentration deficit.  Denies side effects of medication. Message sent to Dr. Enrique regarding an update of patient symptoms and plan.     3/15/22:   Increase Wellbutrin  mg by mouth daily to twice daily as patient reports at times taking twice daily to target anxiety, attention & concentration deficit.      Increase Adderall IR 10 mg by mouth from once daily to twice daily to target attention and concentration deficit, take one in the morning and the 2nd dose in afternoon approximately 6 hr apart.    Denies side effects of medication. Message  sent to Dr. Enrique regarding an update of patient symptoms and plan.     2/15/22:   -POC UDS  -CSA signed and reviewed with patient   -Continue Wellbutrin  mg by mouth daily to target anxiety, attention & concentration deficit.    -Adderall IR 5 mg by mouth twice daily to target attention and concentration deficit, take one in the morning and the 2nd dose in afternoon approximately 6 hr apart.  Risks, benefits, side effects discussed with patient including elevated heart rate, elevated blood pressure, irritability, insomnia, sexual dysfunction, appetite suppressing properties, psychosis.  After discussion of these risks and benefits, the patient voiced understanding and agreed to proceed. UDS ordered, Jovita reviewed. Informed patient medication would not be ordered until UDS results received, and would be ordered per Dr. Enrique.   Adderall IR 10 mg by mouth daily was ordered per  on 2/15/22      11/30/22:   Change Wellbutrin  mg twice daily to Wellbutrin  mg by mouth daily in the am to target anxiety, attention & concentration deficit.    ADHD testing referral to Al Cole with [facility] in [place], [place] as patient prefers to see provider closer to home.  List given of providers (4) for testing, patient to contact to schedule appointment.        Patient screened positive for depression based on a PHQ-9 score of 6 on 8/15/22. Follow-up recommendations include: Prescribed antidepressant medication treatment and Suicide Risk Assessment performed.       TREATMENT PLAN/GOALS: Continue supportive psychotherapy efforts and medications as indicated. Treatment and medication options discussed during today's visit. Patient acknowledged and verbally consented to continue with current treatment plan and was educated on the importance of compliance with treatment and follow-up appointments.    MEDICATION ISSUES:  JOVITA reviewed as expected.  Discussed medication options and treatment  plan of prescribed medication as well as the risks, benefits, and side effects including potential falls, possible impaired driving and metabolic adversities among others. Patient is agreeable to call the office with any worsening of symptoms or onset of side effects. Patient is agreeable to call 911 or go to the nearest ER should he/she begin having SI/HI. No medication side effects or related complaints today.     MEDS ORDERED DURING VISIT:  No orders of the defined types were placed in this encounter.      Return in about 5 weeks (around 10/21/2022) for Video visit.          I spent 16 minutes caring for Vickie on this date of service. This time includes time spent by me in the following activities: preparing for the visit, performing a medically appropriate examination and/or evaluation, counseling and educating the patient/family/caregiver, referring and communicating with other health care professionals, documenting information in the medical record and care coordination     This document has been electronically signed by NIKUNJ Neri  September 16, 2022 08:50 EDT      Part of this note may be an electronic transcription/translation of spoken language to printed text using the Dragon Dictation System.

## 2022-10-21 ENCOUNTER — TELEMEDICINE (OUTPATIENT)
Dept: BEHAVIORAL HEALTH | Facility: CLINIC | Age: 52
End: 2022-10-21

## 2022-10-21 DIAGNOSIS — F90.0 ADHD (ATTENTION DEFICIT HYPERACTIVITY DISORDER), INATTENTIVE TYPE: Primary | ICD-10-CM

## 2022-10-21 PROCEDURE — 99213 OFFICE O/P EST LOW 20 MIN: CPT | Performed by: NURSE PRACTITIONER

## 2022-10-21 RX ORDER — DEXTROAMPHETAMINE SACCHARATE, AMPHETAMINE ASPARTATE, DEXTROAMPHETAMINE SULFATE AND AMPHETAMINE SULFATE 5; 5; 5; 5 MG/1; MG/1; MG/1; MG/1
TABLET ORAL
Qty: 75 TABLET | Refills: 0 | Status: SHIPPED | OUTPATIENT
Start: 2022-10-21 | End: 2022-11-20

## 2022-10-21 NOTE — PROGRESS NOTES
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 Patient is aware of risk of addiction on this medication, understands need to follow up for a review every 3 months and medications will be adjusted or decreased as deemed appropriate at each visit.  No history of drug or alcohol abuse.  No concerns about diversion or abuse. Patient denies side effects related to the medication.  Patient is aware of random urine drug screens and pill counts. The dosing of this medication will be reviewed on a regular basis and reduced if possible..  Ongoing use of a controlled substance is necessary for this patient to have a normal quality of life.    8/15/22:   Continue Wellbutrin  mg by mouth  twice daily to target anxiety, attention & concentration deficit. Refilled today     Increase Adderall IR from 15 mg to 20 mg by mouth twice daily to target attention and concentration deficit.  Last dispensed 7/19/22 for 30 day supply #60 per   Will send order to  today and allow first fill 8/17/22.     Will increase Adderall IR to 20 mg twice daily as patient has been experiencing inability to complete tasks as she is shifting from one uncompleted tasks to another in between morning and mid afternoon doses of 15 mg IR.       7/11/22:   Continue Wellbutrin  mg by mouth  twice daily to target anxiety, attention & concentration deficit.      Continue Adderall IR 15 mg by mouth  twice daily to target attention and concentration deficit.  Last dispensed 6/18/22 for 30 day supply #60 per   Patient doing well with current medication regimen will continue current medications and send update to  for refill of Adderall IR. Will see patient back in 5 weeks    6/13/22:   Patient tolerating Adderall 15 mg IR twice daily without difficulty, symptoms are managed well currently. Will send message to  informing of toleration and refill request, patient was informed medication would not be available for refill until closer to 6/19/22, will have patient return in 4-5 weeks closer to refill date.   Continue Wellbutrin  mg by mouth  twice daily to target anxiety, attention & concentration deficit.    Continue Adderall IR 15 mg by mouth twice daily to target attention and concentration deficit.  Last dispensed 5/19/22 for 30 day supply #60 per     5/11/22:   Continue Wellbutrin  mg by mouth  twice daily to target anxiety, attention & concentration deficit.      Will stop Adderall XR 15 mg due to hot flash sensation and ineffectiveness.   Will plan on changing Adderall IR 10 mg by mouth every afternoon to Adderall IR 15 mg by mouth twice daily to target attention and concentration deficit.  Last IR dispensed 4/18/22, will send message to Dr. Enrique regarding recommendations and symptoms, patient informed medication would not be available for  until 5/18/22, patient verbalized understanding.       4/13/22:  Continue Wellbutrin  mg by mouth  twice daily to target anxiety, attention & concentration deficit.      Start Adderall XR 15 mg by mouth daily every morning and continue Adderall IR 10 mg by mouth every afternoon to target attention and concentration deficit.  Denies side effects of medication. Message sent to Dr. Enrique regarding an update of patient symptoms and plan.     3/15/22:   Increase Wellbutrin  mg by mouth daily to twice daily as  patient reports at times taking twice daily to target anxiety, attention & concentration deficit.      Increase Adderall IR 10 mg by mouth from once daily to twice daily to target attention and concentration deficit, take one in the morning and the 2nd dose in afternoon approximately 6 hr apart.    Denies side effects of medication. Message sent to Dr. Enrique regarding an update of patient symptoms and plan.     2/15/22:   -POC UDS  -CSA signed and reviewed with patient   -Continue Wellbutrin  mg by mouth daily to target anxiety, attention & concentration deficit.    -Adderall IR 5 mg by mouth twice daily to target attention and concentration deficit, take one in the morning and the 2nd dose in afternoon approximately 6 hr apart.  Risks, benefits, side effects discussed with patient including elevated heart rate, elevated blood pressure, irritability, insomnia, sexual dysfunction, appetite suppressing properties, psychosis.  After discussion of these risks and benefits, the patient voiced understanding and agreed to proceed. UDS ordered, Ryan reviewed. Informed patient medication would not be ordered until UDS results received, and would be ordered per Dr. Enrique.   Adderall IR 10 mg by mouth daily was ordered per  on 2/15/22      11/30/22:   Change Wellbutrin  mg twice daily to Wellbutrin  mg by mouth daily in the am to target anxiety, attention & concentration deficit.    ADHD testing referral to Al Cole with [facility] in [place], [place] as patient prefers to see provider closer to home.  List given of providers (4) for testing, patient to contact to schedule appointment.    Patient screened positive for depression based on a PHQ-9 score of 6 on 8/15/22. Follow-up recommendations include: Prescribed antidepressant medication treatment and Suicide Risk Assessment performed.       TREATMENT PLAN/GOALS: Continue supportive psychotherapy efforts and medications as indicated.  Treatment and medication options discussed during today's visit. Patient acknowledged and verbally consented to continue with current treatment plan and was educated on the importance of compliance with treatment and follow-up appointments.    MEDICATION ISSUES:  JOVITA reviewed as expected.  Discussed medication options and treatment plan of prescribed medication as well as the risks, benefits, and side effects including potential falls, possible impaired driving and metabolic adversities among others. Patient is agreeable to call the office with any worsening of symptoms or onset of side effects. Patient is agreeable to call 911 or go to the nearest ER should he/she begin having SI/HI. No medication side effects or related complaints today.     MEDS ORDERED DURING VISIT:  No orders of the defined types were placed in this encounter.      Return in about 4 weeks (around 11/18/2022) for Video visit.          I spent 25 minutes caring for Vickie on this date of service. This time includes time spent by me in the following activities: preparing for the visit, performing a medically appropriate examination and/or evaluation, counseling and educating the patient/family/caregiver, referring and communicating with other health care professionals, documenting information in the medical record and care coordination     This document has been electronically signed by NIKUNJ Neri  October 21, 2022 08:38 EDT      Part of this note may be an electronic transcription/translation of spoken language to printed text using the Dragon Dictation System.

## 2022-10-21 NOTE — PATIENT INSTRUCTIONS
"1.  Please return to clinic at your next scheduled visit.  Contact the [facility] ([phone]) or **Manjula, Medical Assistant at [place] Office directly at [phone] at least 24 hours prior in the event you need to cancel.**    2. Should you want to get in touch with your provider, NIKUNJ Neri, please contact MY Medical Assistant, Manjula, directly at [phone].  Recommend saving Manjlua's direct number in phone as this is the PREFERRED & EASIEST way to get in contact with your provider.  Please leave a voice mail if you do not get an answer and she will return your call within 24 hrs. You will NOT be able to contact provider on [facility], as Behavioral Health Providers are restricted. YOU MUST CALL [phone]    If you need to speak with the on call provider after hours or on weekends, please Contact the [facility] ([phone]) and staff will be able to page the provider on call directly.        3, MEDICATION REFILLS:  PLEASE CALL THE PHARMACY TO REQUEST ALL MEDICATION REFILLS TO ENSURE YOU ARE RECEIVING YOUR MEDICATIONS IN A TIMELY MANNER.    IF YOU USE AN AUTOMATED SERVICE AT THE PHARMACY FOR REFILLS AND ARE TOLD THERE ARE \"NO REFILLS REMAINING\"   PLEASE CALL THE PHARMACY & SPEAK TO A LIVE PERSON TO VERIFY IT IS THE MOST UP TO DATE PRESCRIPTION ON FILE.    All new prescriptions will have a different number, therefore, if you were given refills for a medication today or at last visit it will not have the same number as the previous prescription.       4.  In the event you have personal crisis, contact the following crisis numbers: Suicide Prevention Hotline 1-679.988.3630 or *988, [facility] Helpline 6-736-501-KADIE; [facility] Emergency Room [phone]; text HELLO to 543551; or 963.      5. We would appreciate your feedback, please scan the QRS code on the back of your appointment card (or see below) and complete a brief survey.  [place] location is still not available, so " "please click \"[place]\" location.  Thank you      SPECIFIC RECOMMENDATIONS:     1.      Medications discussed at this encounter:                   - Add Adderall IR 10 mg to take at 12-1 pm and continue 20 mg at 7 am, and take 2nd dose at 6 pm.      2.      Psychotherapy recommendations: Declined     3.     Return to clinic: 4 weeks    Please arrive at least 15 minutes before your scheduled appointment time to complete check in process.      IF you are scheduled for a [facility] VIDEO visit, PLEASE ANSWER YOUR PHONE WHEN OFFICE CALLS PRIOR TO VISIT TO COMPLETE THE CHECK IN PROCESS, EVEN IF THE E-CHECK IN WAS COMPLETED.     If you would like to log on to [facility] and complete the \"E-Check IN\" prior to your visit, please do so, this will speed up the check in process.  If you are due for questionnaires, you will find those on [facility] as well, please try to complete prior to your scheduled appointment.          "

## 2022-11-11 ENCOUNTER — TELEPHONE (OUTPATIENT)
Dept: BEHAVIORAL HEALTH | Facility: CLINIC | Age: 52
End: 2022-11-11

## 2022-11-11 NOTE — TELEPHONE ENCOUNTER
Patient called and LMVM that she had dropped off a Medical Clearance DOT to be filled out by Mallorie at the  in [facility] today and was told I will pick it up Wednesday and have Mallorie fill out so she can  Thursday in [place].

## 2022-11-11 NOTE — TELEPHONE ENCOUNTER
Called patient back to inquire further, and asked her if they were to scan it in chart and I print it off for Mallorie to sign would that work?  Patient says probably not so I told her I would pick it up Wednesday and bring to Mallorie for signature Thursday morning and she can pick it up then.  Patient thanked me for calling

## 2022-11-28 ENCOUNTER — TELEPHONE (OUTPATIENT)
Dept: BEHAVIORAL HEALTH | Facility: CLINIC | Age: 52
End: 2022-11-28

## 2022-11-28 DIAGNOSIS — F90.0 ADHD (ATTENTION DEFICIT HYPERACTIVITY DISORDER), INATTENTIVE TYPE: Primary | ICD-10-CM

## 2022-11-28 RX ORDER — DEXTROAMPHETAMINE SACCHARATE, AMPHETAMINE ASPARTATE, DEXTROAMPHETAMINE SULFATE AND AMPHETAMINE SULFATE 5; 5; 5; 5 MG/1; MG/1; MG/1; MG/1
20 TABLET ORAL 2 TIMES DAILY
Qty: 60 TABLET | Refills: 0 | Status: SHIPPED | OUTPATIENT
Start: 2022-11-28 | End: 2022-12-16 | Stop reason: SDUPTHER

## 2022-11-28 NOTE — TELEPHONE ENCOUNTER
Patient called to request refill on her Adderall.  Patient has appt scheduled for 12/06/2022 for follow up (due to availability).  Please review) Medication is not pended I could not find the spot to reorder it.  Please submit short term supply

## 2022-12-06 ENCOUNTER — TELEMEDICINE (OUTPATIENT)
Dept: BEHAVIORAL HEALTH | Facility: CLINIC | Age: 52
End: 2022-12-06

## 2022-12-06 DIAGNOSIS — F41.1 GENERALIZED ANXIETY DISORDER: ICD-10-CM

## 2022-12-06 DIAGNOSIS — F90.0 ADHD (ATTENTION DEFICIT HYPERACTIVITY DISORDER), INATTENTIVE TYPE: Primary | ICD-10-CM

## 2022-12-06 PROCEDURE — 99214 OFFICE O/P EST MOD 30 MIN: CPT | Performed by: NURSE PRACTITIONER

## 2022-12-06 NOTE — PROGRESS NOTES
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 heart rate, elevated blood pressure, irritability, insomnia, sexual dysfunction, appetite suppressing properties, psychosis.  After discussion of these risks and benefits, the patient voiced understanding and agreed to proceed.  Patient instructed to notify provider at office no later than Thurs 12/15/22 so correct prescription is ordered.  Tolerating new dosing well with reported effectiveness.     Controlled substance documentation: Ryan reviewed; prior urine drug screen consistent obtained 2/15/22; consent is up to date, signed, witnessed and in EHR, dated 2/15/22, which will be updated annually per policy. Patient is aware of risk of addiction on this medication, understands need to follow up for a review every 3 months and medications will be adjusted or decreased as deemed appropriate at each visit.  No history of drug or alcohol abuse.  No concerns about diversion or abuse. Patient denies side effects related to the medication.  Patient is aware of random urine drug screens and pill counts. The dosing of this medication will be reviewed on a regular basis and reduced if possible..  Ongoing use of a controlled substance is necessary for this patient to have a normal quality of life.    5. Labs: n/a    Symptoms of anxiety and sleep disturbance appear to be chronic and unrelated to medications, as patient is working 60 hrs/week and going to school full time.   Symptoms of anxiety, ADHD are under good control with Wellbutrin and Adderall.  Patient to contact provider if symptoms worsen or fail to improve.     10/21/22:   Continue Wellbutrin  mg by mouth  twice daily to target anxiety, attention & concentration deficit.     Continue Adderall IR 20 mg by mouth twice daily to target attention and concentration deficit.  Last dispensed 9/16/22 #60/30 day supply. Instructed to continue 7 am dose and take 2nd dose later at 6 pm.   ADD Adderall IR 10 mg by mouth daily at 1 pm to target attention and concentration deficit. Risks, benefits, side effects discussed with patient including elevated heart rate, elevated blood pressure, irritability, insomnia, sexual dysfunction, appetite suppressing properties, psychosis.  After discussion of these risks and benefits, the patient voiced understanding and agreed to proceed.   Informed patient order would be sent to Dr. Enrique in separate entry for signature due to EMR system, and will not see as refilled on AVS today.  Controlled substance documentation: Ryan reviewed; prior urine drug screen consistent obtained 2/15/22; consent is up to date, signed, witnessed and in EHR, dated 2/15/22, which will be updated annually per policy. Patient is aware of risk of addiction on this medication, understands need to follow up for a review every 3 months and medications will be adjusted or decreased as deemed appropriate at each visit.  No history of drug or alcohol abuse.  No concerns about diversion or abuse. Patient denies side effects related to the medication.  Patient is aware of random urine drug screens and pill counts. The dosing of this medication will be reviewed on a regular basis and reduced if possible..  Ongoing use of a controlled substance is necessary for this patient to have a normal quality of life.  Collaborated with  regarding adding a 3rd dose of Adderall IR as patient day starts at 7 am and ends at 12 am with  work and school schedule.  Will add 10 mg to take midday at 12-1 pm.        9/16/22:   Declines therapy  Continue Wellbutrin  mg by mouth  twice daily to target anxiety, attention & concentration deficit.   Continue Adderall IR 20 mg by mouth twice daily to target attention and concentration deficit.  Last dispensed 8/17/22 for 30 day supply #60 per   Will send order to  today. Will inquire of 3 month supply with , however, explained to patient a 90 day supply would not be ordered until stabilized on a maintanence dose, patient verbalized understanding.   Tolerating medications well, denies side effects, attention and concentration has improved with increase dose.    Controlled substance documentation: Ryan reviewed; prior urine drug screen consistent obtained 2/15/22; consent is up to date, signed, witnessed and in EHR, dated 2/15/22, which will be updated annually per policy. Patient is aware of risk of addiction on this medication, understands need to follow up for a review every 3 months and medications will be adjusted or decreased as deemed appropriate at each visit.  No history of drug or alcohol abuse.  No concerns about diversion or abuse. Patient denies side effects related to the medication.  Patient is aware of random urine drug screens and pill counts. The dosing of this medication will be reviewed on a regular basis and reduced if possible..  Ongoing use of a controlled substance is necessary for this patient to have a normal quality of life.    8/15/22:   Continue Wellbutrin  mg by mouth  twice daily to target anxiety, attention & concentration deficit. Refilled today     Increase Adderall IR from 15 mg to 20 mg by mouth twice daily to target attention and concentration deficit.  Last dispensed 7/19/22 for 30 day supply #60 per   Will send order to  today and allow first fill 8/17/22.     Will increase Adderall IR to 20 mg twice daily as patient  has been experiencing inability to complete tasks as she is shifting from one uncompleted tasks to another in between morning and mid afternoon doses of 15 mg IR.       7/11/22:   Continue Wellbutrin  mg by mouth  twice daily to target anxiety, attention & concentration deficit.      Continue Adderall IR 15 mg by mouth twice daily to target attention and concentration deficit.  Last dispensed 6/18/22 for 30 day supply #60 per   Patient doing well with current medication regimen will continue current medications and send update to  for refill of Adderall IR. Will see patient back in 5 weeks    6/13/22:   Patient tolerating Adderall 15 mg IR twice daily without difficulty, symptoms are managed well currently. Will send message to  informing of toleration and refill request, patient was informed medication would not be available for refill until closer to 6/19/22, will have patient return in 4-5 weeks closer to refill date.   Continue Wellbutrin  mg by mouth  twice daily to target anxiety, attention & concentration deficit.    Continue Adderall IR 15 mg by mouth twice daily to target attention and concentration deficit.  Last dispensed 5/19/22 for 30 day supply #60 per     5/11/22:   Continue Wellbutrin  mg by mouth  twice daily to target anxiety, attention & concentration deficit.      Will stop Adderall XR 15 mg due to hot flash sensation and ineffectiveness.   Will plan on changing Adderall IR 10 mg by mouth every afternoon to Adderall IR 15 mg by mouth twice daily to target attention and concentration deficit.  Last IR dispensed 4/18/22, will send message to Dr. Enrique regarding recommendations and symptoms, patient informed medication would not be available for  until 5/18/22, patient verbalized understanding.       4/13/22:  Continue Wellbutrin  mg by mouth  twice daily to target anxiety, attention & concentration deficit.      Start Adderall XR 15 mg  by mouth daily every morning and continue Adderall IR 10 mg by mouth every afternoon to target attention and concentration deficit.  Denies side effects of medication. Message sent to Dr. Enrique regarding an update of patient symptoms and plan.     3/15/22:   Increase Wellbutrin  mg by mouth daily to twice daily as patient reports at times taking twice daily to target anxiety, attention & concentration deficit.      Increase Adderall IR 10 mg by mouth from once daily to twice daily to target attention and concentration deficit, take one in the morning and the 2nd dose in afternoon approximately 6 hr apart.    Denies side effects of medication. Message sent to Dr. Enrique regarding an update of patient symptoms and plan.     2/15/22:   -POC UDS  -CSA signed and reviewed with patient   -Continue Wellbutrin  mg by mouth daily to target anxiety, attention & concentration deficit.    -Adderall IR 5 mg by mouth twice daily to target attention and concentration deficit, take one in the morning and the 2nd dose in afternoon approximately 6 hr apart.  Risks, benefits, side effects discussed with patient including elevated heart rate, elevated blood pressure, irritability, insomnia, sexual dysfunction, appetite suppressing properties, psychosis.  After discussion of these risks and benefits, the patient voiced understanding and agreed to proceed. UDS ordered, Ryan reviewed. Informed patient medication would not be ordered until UDS results received, and would be ordered per Dr. Enrique.   Adderall IR 10 mg by mouth daily was ordered per  on 2/15/22      11/30/22:   Change Wellbutrin  mg twice daily to Wellbutrin  mg by mouth daily in the am to target anxiety, attention & concentration deficit.    ADHD testing referral to Al Cole with [facility] in [place], [place] as patient prefers to see provider closer to home.  List given of providers (4) for testing, patient to contact to  schedule appointment.    Patient screened positive for depression based on a PHQ-9 score of 4 on 12/6/22. Follow-up recommendations include: Prescribed antidepressant medication treatment and Suicide Risk Assessment performed.       TREATMENT PLAN/GOALS: Continue supportive psychotherapy efforts and medications as indicated. Treatment and medication options discussed during today's visit. Patient acknowledged and verbally consented to continue with current treatment plan and was educated on the importance of compliance with treatment and follow-up appointments.    MEDICATION ISSUES:  JOVITA reviewed as expected.  Discussed medication options and treatment plan of prescribed medication as well as the risks, benefits, and side effects including potential falls, possible impaired driving and metabolic adversities among others. Patient is agreeable to call the office with any worsening of symptoms or onset of side effects. Patient is agreeable to call 911 or go to the nearest ER should he/she begin having SI/HI. No medication side effects or related complaints today.     MEDS ORDERED DURING VISIT:  No orders of the defined types were placed in this encounter.      Return in about 5 weeks (around 1/10/2023) for Video visit, medication check.           I spent 33 minutes caring for Vickie on this date of service. This time includes time spent by me in the following activities: preparing for the visit, performing a medically appropriate examination and/or evaluation, counseling and educating the patient/family/caregiver, referring and communicating with other health care professionals, documenting information in the medical record and care coordination  Patient had to be called to connect to [employer] after waiting for almost 4 minutes as patient had not filled out questionnaires until called for check in, able to assist with logging on [employer].     This document has been electronically signed by NIKUNJ Neri  December  6, 2022 08:59 EST      Part of this note may be an electronic transcription/translation of spoken language to printed text using the Dragon Dictation System.

## 2022-12-06 NOTE — PATIENT INSTRUCTIONS
"1.  Please return to clinic at your next scheduled visit.  Contact the [facility] ([phone]) or **Manjula, Medical Assistant at [place] Office directly at [phone] at least 24 hours prior in the event you need to cancel.**    2. Should you want to get in touch with your provider, NIKUNJ Neri, please contact MY Medical Assistant, Manjula, directly at [phone].  Recommend saving Manjula's direct number in phone as this is the PREFERRED & EASIEST way to get in contact with your provider.  Please leave a voice mail if you do not get an answer and she will return your call within 24 hrs. You will NOT be able to contact provider on [facility], as Behavioral Health Providers are restricted. YOU MUST CALL [phone]    If you need to speak with the on call provider after hours or on weekends, please Contact the [facility] ([phone]) and staff will be able to page the provider on call directly.        3, MEDICATION REFILLS:  PLEASE CALL THE PHARMACY TO REQUEST ALL MEDICATION REFILLS TO ENSURE YOU ARE RECEIVING YOUR MEDICATIONS IN A TIMELY MANNER.    IF YOU USE AN AUTOMATED SERVICE AT THE PHARMACY FOR REFILLS AND ARE TOLD THERE ARE \"NO REFILLS REMAINING\"   PLEASE CALL THE PHARMACY & SPEAK TO A LIVE PERSON TO VERIFY IT IS THE MOST UP TO DATE PRESCRIPTION ON FILE.    All new prescriptions will have a different number, therefore, if you were given refills for a medication today or at last visit it will not have the same number as the previous prescription.       4.  In the event you have personal crisis, contact the following crisis numbers: Suicide Prevention Hotline 1-100.613.4890 or *988, [facility] Helpline 9-351-964-KADIE; [facility] Emergency Room [phone]; text HELLO to 490923; or 762.      5. We would appreciate your feedback, please scan the QRS code on the back of your appointment card (or see below) and complete a brief survey.  [place] location is still not available, so " "please click \"[place]\" location.  Thank you      SPECIFIC RECOMMENDATIONS:     1.      Medications discussed at this encounter:                   - no changes  notify provider at office no later than Thurs 12/15/22 so correct prescription is ordered.  Last prescription filled 11/28/22 #60 tabs, at 3 times daily that will give you 20 days, allowing for refill 12/17/22 which is a Sat.      2.      Psychotherapy recommendations: Declined     3.     Return to clinic: 5-6 weeks    Please arrive at least 15 minutes before your scheduled appointment time to complete check in process.      IF you are scheduled for a [employer] VIDEO visit, PLEASE ANSWER YOUR PHONE WHEN OFFICE CALLS PRIOR TO VISIT TO COMPLETE THE CHECK IN PROCESS, EVEN IF THE E-CHECK IN WAS COMPLETED.     If you would like to log on to [employer] and complete the \"E-Check IN\" prior to your visit, please do so, this will speed up the check in process.  If you are due for questionnaires, you will find those on [employer] as well, please try to complete prior to your scheduled appointment.          "

## 2022-12-16 DIAGNOSIS — F90.0 ADHD (ATTENTION DEFICIT HYPERACTIVITY DISORDER), INATTENTIVE TYPE: ICD-10-CM

## 2022-12-16 RX ORDER — DEXTROAMPHETAMINE SACCHARATE, AMPHETAMINE ASPARTATE, DEXTROAMPHETAMINE SULFATE AND AMPHETAMINE SULFATE 5; 5; 5; 5 MG/1; MG/1; MG/1; MG/1
TABLET ORAL
Qty: 75 TABLET | Refills: 0 | Status: SHIPPED | OUTPATIENT
Start: 2022-12-16 | End: 2022-12-27 | Stop reason: RX

## 2022-12-16 NOTE — TELEPHONE ENCOUNTER
"Patient called to say she is calling for her refill \"since my last refill was called incorrectly last time\"   please review  "

## 2022-12-27 ENCOUNTER — TELEPHONE (OUTPATIENT)
Dept: BEHAVIORAL HEALTH | Facility: CLINIC | Age: 52
End: 2022-12-27

## 2022-12-27 DIAGNOSIS — F90.0 ADHD (ATTENTION DEFICIT HYPERACTIVITY DISORDER), INATTENTIVE TYPE: ICD-10-CM

## 2022-12-27 NOTE — TELEPHONE ENCOUNTER
Patient called around and would like for you to cancel the Rx to [facility] and resend to [facility] on [address].(she says they have it in stock.  Please resend to the [facility].

## 2022-12-27 NOTE — TELEPHONE ENCOUNTER
Please advise her to contact various pharmacies to determine availability, such as [facility], [facility], [facility] are a few I can recall that have had medication in supply. She will have to be seen before switching medications, and I wouldn't advise switching as she has done well with Adderall and it is available, though some pharmacies are not getting at routine intervals.  But she will need to call around and see which pharmacies are able to fill medication.  It looks like [facility] has filled Adderall every month.

## 2022-12-27 NOTE — TELEPHONE ENCOUNTER
"Patient called asking if she can be changed to something other than Adderall (\"since it is currently unavailable, or hard to find?\").  Please advise  "

## 2022-12-27 NOTE — TELEPHONE ENCOUNTER
Dr. Enrique is out this week, so I will send request to NIKUNJ Reyes, last dispensed 11/28/22, patient did not  recent order from 12/16/22.

## 2022-12-27 NOTE — TELEPHONE ENCOUNTER
Called and advised patient of Mallorie's response.  Patient says she will call around and see where she can find it and call me back and let me know.

## 2022-12-28 RX ORDER — DEXTROAMPHETAMINE SACCHARATE, AMPHETAMINE ASPARTATE, DEXTROAMPHETAMINE SULFATE AND AMPHETAMINE SULFATE 5; 5; 5; 5 MG/1; MG/1; MG/1; MG/1
TABLET ORAL
Qty: 75 TABLET | Refills: 0 | Status: SHIPPED | OUTPATIENT
Start: 2022-12-28 | End: 2023-01-11 | Stop reason: DRUGHIGH

## 2023-01-11 ENCOUNTER — TELEMEDICINE (OUTPATIENT)
Dept: PSYCHIATRY | Facility: CLINIC | Age: 53
End: 2023-01-11
Payer: COMMERCIAL

## 2023-01-11 DIAGNOSIS — F90.0 ADHD (ATTENTION DEFICIT HYPERACTIVITY DISORDER), INATTENTIVE TYPE: Primary | ICD-10-CM

## 2023-01-11 PROCEDURE — 99214 OFFICE O/P EST MOD 30 MIN: CPT | Performed by: NURSE PRACTITIONER

## 2023-01-11 RX ORDER — B-COMPLEX WITH VITAMIN C
TABLET ORAL DAILY
COMMUNITY

## 2023-01-11 RX ORDER — DEXTROAMPHETAMINE SACCHARATE, AMPHETAMINE ASPARTATE, DEXTROAMPHETAMINE SULFATE AND AMPHETAMINE SULFATE 5; 5; 5; 5 MG/1; MG/1; MG/1; MG/1
20 TABLET ORAL 3 TIMES DAILY
Qty: 90 TABLET | Refills: 0
Start: 2023-01-11 | End: 2023-01-23 | Stop reason: DRUGHIGH

## 2023-01-11 RX ORDER — ALBUTEROL SULFATE 90 UG/1
AEROSOL, METERED RESPIRATORY (INHALATION)
COMMUNITY
Start: 2022-12-28

## 2023-01-11 NOTE — PATIENT INSTRUCTIONS
"1.  Please return to clinic at your next scheduled visit.  Contact the [facility] ([phone]) or **Manjula, Medical Assistant at [place] Office directly at [phone] at least 24 hours prior in the event you need to cancel.**    2. Should you want to get in touch with your provider, NIKUNJ Neri, please contact MY Medical Assistant, Manjula, directly at [phone].  Recommend saving Manjula's direct number in phone as this is the PREFERRED & EASIEST way to get in contact with your provider.  Please leave a voice mail if you do not get an answer and she will return your call within 24 hrs. You will NOT be able to contact provider on [facility], as Behavioral Health Providers are restricted. YOU MUST CALL [phone]    If you need to speak with the on call provider after hours or on weekends, please Contact the [facility] ([phone]) and staff will be able to page the provider on call directly.        3, MEDICATION REFILLS:  PLEASE CALL THE PHARMACY TO REQUEST ALL MEDICATION REFILLS TO ENSURE YOU ARE RECEIVING YOUR MEDICATIONS IN A TIMELY MANNER.    IF YOU USE AN AUTOMATED SERVICE AT THE PHARMACY FOR REFILLS AND ARE TOLD THERE ARE \"NO REFILLS REMAINING\"   PLEASE CALL THE PHARMACY & SPEAK TO A LIVE PERSON TO VERIFY IT IS THE MOST UP TO DATE PRESCRIPTION ON FILE.    All new prescriptions will have a different number, therefore, if you were given refills for a medication today or at last visit it will not have the same number as the previous prescription.       4.  In the event you have personal crisis, contact the following crisis numbers: Suicide Prevention Hotline 1-690.194.4994 or *988, [facility] Helpline 9-793-723-KADIE; [facility] Emergency Room [phone]; text HELLO to 996510; or 045.      5. We would appreciate your feedback, please scan the QRS code on the back of your appointment card (or see below) and complete a brief survey.  [place] location is still not available, so " "please click \"[place]\" location.  Thank you      SPECIFIC RECOMMENDATIONS:     1.      Medications discussed at this encounter:                   - call to Manjula when you need a refill for Adderall to ensure we are sending in the correct dose.  Increase Adderall IR 20 mg to 3 TIMES DAILY      2.      Psychotherapy recommendations:  Declined     3.     Return to clinic: Scheduled for Thurs. 2/16/23 at 1pm, arrive at 1245 let the staff know you need to do a urine for me first so they will send you to the Allergy testing room then to me.  Do not go to the lab at the end of the hallway.     Please arrive at least 15 minutes before your scheduled appointment time to complete check in process.      IF you are scheduled for a [employer] VIDEO visit, PLEASE ANSWER YOUR PHONE WHEN OFFICE CALLS PRIOR TO VISIT TO COMPLETE THE CHECK IN PROCESS, EVEN IF THE E-CHECK IN WAS COMPLETED.     If you would like to log on to [employer] and complete the \"E-Check IN\" prior to your visit, please do so, this will speed up the check in process.  If you are due for questionnaires, you will find those on [employer] as well, please try to complete prior to your scheduled appointment.          "

## 2023-01-11 NOTE — PROGRESS NOTES
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 Adderall IR FROM 20 mg by mouth 2 times daily (7am&6pm) AND one half tablet to equal 10 mg at midday at 12-1 pm TO 20 mg by mouth 3 TIMES DAILY to target attention and concentration deficit.  Last dispensed 12/28/22 #75/30 day supply.    Risks, benefits, side effects discussed with patient including elevated heart rate, elevated blood pressure, irritability, insomnia, sexual dysfunction, appetite suppressing properties, psychosis.  After discussion of these risks and benefits, the patient voiced understanding and agreed to proceed.  Patient instructed to contact MA directly in office when refill needed as patient is not due for refill today and has been instructed to start taking 3 of the 20 mg daily and will deplete current supply before 1/28/23.  Updated order in EHR, ordered as a NO PRINT.  Will continue to send to [facility] pharmacy due to availability of Adderall as [facility] been running [facility] of medication.   Controlled substance documentation: Ryan reviewed; prior urine drug screen consistent obtained 2/15/22; consent is up to date, signed, witnessed and in EHR, dated 2/15/22, which will be updated annually per policy. Patient is aware of risk of addiction on this medication, understands need to follow up for a review every 3 months and medications will be adjusted or decreased as deemed appropriate at each visit.  No history of  "drug or alcohol abuse.  No concerns about diversion or abuse. Patient denies side effects related to the medication.  Patient is aware of random urine drug screens and pill counts. The dosing of this medication will be reviewed on a regular basis and reduced if possible..  Ongoing use of a controlled substance is necessary for this patient to have a normal quality of life.    5. Labs: n/a  6. Patient to be seen in office for next visit to sign annual CSA and provide UDS.    Symptoms of ADHD are under fair control with current Adderall regimen, will increase afternoon/midday dose today, patient instructed to contact provider if unable to tolerate, denies side effects.  Morning and Evening doses of Adderall IR 20 mg have been effective thus far in management of ADHD symptoms.  Coordinated care with patient for an in office visit next month, patient instructed to arrive at [facility] at 1245 2/16/23 and provide urine sample and for in office visit, so patient can leave in timely manner to arrive to employer in [place]. Informed office staff of scheduling and no need to contact patient to set up appointment. Patient to contact provider if symptoms worsen or fail to improve.       12/27/22:  TELEPHONE  Patient called asking if she can be changed to something other than Adderall (\"since it is currently unavailable, or hard to find?\").  Please advise  Please advise her to contact various pharmacies to determine availability, such as [facility], [facility], [facility] are a few I can recall that have had medication in supply. She will have to be seen before switching medications, and I wouldn't advise switching as she has done well with Adderall and it is available, though some pharmacies are not getting at routine intervals.  But she will need to call around and see which pharmacies are able to fill medication.  It looks like [facility] has filled Adderall every month.   Called and advised patient of Mallorie's response.  Patient says " she will call around and see where she can find it and call me back and let me know.  Patient called around and would like for [facility] to [facility] and [facility] to [facility] on [address] [facility].(she says they have it in stock.  Please resend to the [facility].  Dr. Enrique is out this week, so I will send request to NIKUNJ Reyes, last dispensed 11/28/22, patient did not  recent order from 12/16/22.     12/6/22:  Declines therapy  Continue Wellbutrin  mg by mouth  twice daily to target anxiety, attention & concentration deficit.     Continue Adderall IR 20 mg by mouth 2 times daily (7am&6pm) AND one half tablet to equal 10 mg at midday at 12-1 pm to target attention and concentration deficit.  Last dispensed 11/28/22 #60/30 day supply.  Patient had requested short supply on 11/28/22, however, order was sent as previous prescription for twice daily dosing.  10/21/22 #75/30 days with changes. Risks, benefits, side effects discussed with patient including elevated heart rate, elevated blood pressure, irritability, insomnia, sexual dysfunction, appetite suppressing properties, psychosis.  After discussion of these risks and benefits, the patient voiced understanding and agreed to proceed.  Patient instructed to notify provider at office no later than Thurs 12/15/22 so correct prescription is ordered.  Tolerating new dosing well with reported effectiveness.     Controlled substance documentation: Ryan reviewed; prior urine drug screen consistent obtained 2/15/22; consent is up to date, signed, witnessed and in EHR, dated 2/15/22, which will be updated annually per policy. Patient is aware of risk of addiction on this medication, understands need to follow up for a review every 3 months and medications will be adjusted or decreased as deemed appropriate at each visit.  No history of drug or alcohol abuse.  No concerns about diversion or abuse. Patient denies side effects related to the medication.  Patient  is aware of random urine drug screens and pill counts. The dosing of this medication will be reviewed on a regular basis and reduced if possible..  Ongoing use of a controlled substance is necessary for this patient to have a normal quality of life.  Symptoms of anxiety and sleep disturbance appear to be chronic and unrelated to medications, as patient is working 60 hrs/week and going to school full time.  Symptoms of anxiety, ADHD are under good control with Wellbutrin and Adderall.  Patient to contact provider if symptoms worsen or fail to improve.     10/21/22:   Continue Wellbutrin  mg by mouth  twice daily to target anxiety, attention & concentration deficit.     Continue Adderall IR 20 mg by mouth twice daily to target attention and concentration deficit.  Last dispensed 9/16/22 #60/30 day supply. Instructed to continue 7 am dose and take 2nd dose later at 6 pm.   ADD Adderall IR 10 mg by mouth daily at 1 pm to target attention and concentration deficit. Risks, benefits, side effects discussed with patient including elevated heart rate, elevated blood pressure, irritability, insomnia, sexual dysfunction, appetite suppressing properties, psychosis.  After discussion of these risks and benefits, the patient voiced understanding and agreed to proceed.   Informed patient order would be sent to Dr. Enrique in separate entry for signature due to EMR system, and will not see as refilled on [facility] today.  Controlled substance documentation: Ryan reviewed; prior urine drug screen consistent obtained 2/15/22; consent is up to date, signed, witnessed and in EHR, dated 2/15/22, which will be updated annually per policy. Patient is aware of risk of addiction on this medication, understands need to follow up for a review every 3 months and medications will be adjusted or decreased as deemed appropriate at each visit.  No history of drug or alcohol abuse.  No concerns about diversion or abuse. Patient denies side effects  related to the medication.  Patient is aware of random urine drug screens and pill counts. The dosing of this medication will be reviewed on a regular basis and reduced if possible..  Ongoing use of a controlled substance is necessary for this patient to have a normal quality of life.  Collaborated with  regarding adding a 3rd dose of Adderall IR as patient day starts at 7 am and ends at 12 am with work and school schedule.  Will add 10 mg to take midday at 12-1 pm.        9/16/22:   Declines therapy  Continue Wellbutrin  mg by mouth  twice daily to target anxiety, attention & concentration deficit.   Continue Adderall IR 20 mg by mouth twice daily to target attention and concentration deficit.  Last dispensed 8/17/22 for 30 day supply #60 per   Will send order to  today. Will inquire of 3 month supply with , however, explained to patient a 90 day supply would not be ordered until stabilized on a maintanence dose, patient verbalized understanding.   Tolerating medications well, denies side effects, attention and concentration has improved with increase dose.    Controlled substance documentation: Ryan reviewed; prior urine drug screen consistent obtained 2/15/22; consent is up to date, signed, witnessed and in EHR, dated 2/15/22, which will be updated annually per policy. Patient is aware of risk of addiction on this medication, understands need to follow up for a review every 3 months and medications will be adjusted or decreased as deemed appropriate at each visit.  No history of drug or alcohol abuse.  No concerns about diversion or abuse. Patient denies side effects related to the medication.  Patient is aware of random urine drug screens and pill counts. The dosing of this medication will be reviewed on a regular basis and reduced if possible..  Ongoing use of a controlled substance is necessary for this patient to have a normal quality of life.    8/15/22:   Continue  Wellbutrin  mg by mouth  twice daily to target anxiety, attention & concentration deficit. Refilled today     Increase Adderall IR from 15 mg to 20 mg by mouth twice daily to target attention and concentration deficit.  Last dispensed 7/19/22 for 30 day supply #60 per   Will send order to  today and allow first fill 8/17/22.     Will increase Adderall IR to 20 mg twice daily as patient has been experiencing inability to complete tasks as she is shifting from one uncompleted tasks to another in between morning and mid afternoon doses of 15 mg IR.       7/11/22:   Continue Wellbutrin  mg by mouth  twice daily to target anxiety, attention & concentration deficit.      Continue Adderall IR 15 mg by mouth twice daily to target attention and concentration deficit.  Last dispensed 6/18/22 for 30 day supply #60 per   Patient doing well with current medication regimen will continue current medications and send update to  for refill of Adderall IR. Will see patient back in 5 weeks    6/13/22:   Patient tolerating Adderall 15 mg IR twice daily without difficulty, symptoms are managed well currently. Will send message to  informing of toleration and refill request, patient was informed medication would not be available for refill until closer to 6/19/22, will have patient return in 4-5 weeks closer to refill date.   Continue Wellbutrin  mg by mouth  twice daily to target anxiety, attention & concentration deficit.    Continue Adderall IR 15 mg by mouth twice daily to target attention and concentration deficit.  Last dispensed 5/19/22 for 30 day supply #60 per     5/11/22:   Continue Wellbutrin  mg by mouth  twice daily to target anxiety, attention & concentration deficit.      Will stop Adderall XR 15 mg due to hot flash sensation and ineffectiveness.   Will plan on changing Adderall IR 10 mg by mouth every afternoon to Adderall IR 15 mg by mouth twice  daily to target attention and concentration deficit.  Last IR dispensed 4/18/22, will send message to Dr. Enrique regarding recommendations and symptoms, patient informed medication would not be available for  until 5/18/22, patient verbalized understanding.       4/13/22:  Continue Wellbutrin  mg by mouth  twice daily to target anxiety, attention & concentration deficit.      Start Adderall XR 15 mg by mouth daily every morning and continue Adderall IR 10 mg by mouth every afternoon to target attention and concentration deficit.  Denies side effects of medication. Message sent to Dr. Enrique regarding an update of patient symptoms and plan.     3/15/22:   Increase Wellbutrin  mg by mouth daily to twice daily as patient reports at times taking twice daily to target anxiety, attention & concentration deficit.      Increase Adderall IR 10 mg by mouth from once daily to twice daily to target attention and concentration deficit, take one in the morning and the 2nd dose in afternoon approximately 6 hr apart.    Denies side effects of medication. Message sent to Dr. Enrique regarding an update of patient symptoms and plan.     2/15/22:   -POC UDS  -CSA signed and reviewed with patient   -Continue Wellbutrin  mg by mouth daily to target anxiety, attention & concentration deficit.    -Adderall IR 5 mg by mouth twice daily to target attention and concentration deficit, take one in the morning and the 2nd dose in afternoon approximately 6 hr apart.  Risks, benefits, side effects discussed with patient including elevated heart rate, elevated blood pressure, irritability, insomnia, sexual dysfunction, appetite suppressing properties, psychosis.  After discussion of these risks and benefits, the patient voiced understanding and agreed to proceed. UDS ordered, Ryan reviewed. Informed patient medication would not be ordered until UDS results received, and would be ordered per Dr. Enrique.   Adderall IR 10 mg by  mouth daily was ordered per  on 2/15/22      11/30/22:   Change Wellbutrin  mg twice daily to Wellbutrin  mg by mouth daily in the am to target anxiety, attention & concentration deficit.    ADHD testing referral to Al Cole with [facility] in [place], [place] as patient prefers to see provider closer to home.  List given of providers (4) for testing, patient to contact to schedule appointment.    Patient screened positive for depression based on a PHQ-9 score of 4 on 12/6/22. Follow-up recommendations include: Prescribed antidepressant medication treatment and Suicide Risk Assessment performed.       TREATMENT PLAN/GOALS: Continue supportive psychotherapy efforts and medications as indicated. Treatment and medication options discussed during today's visit. Patient acknowledged and verbally consented to continue with current treatment plan and was educated on the importance of compliance with treatment and follow-up appointments.    MEDICATION ISSUES:  JOVITA reviewed as expected.  Discussed medication options and treatment plan of prescribed medication as well as the risks, benefits, and side effects including potential falls, possible impaired driving and metabolic adversities among others. Patient is agreeable to call the office with any worsening of symptoms or onset of side effects. Patient is agreeable to call 911 or go to the nearest ER should he/she begin having SI/HI. No medication side effects or related complaints today.     MEDS ORDERED DURING VISIT:  New Medications Ordered This Visit   Medications   • amphetamine-dextroamphetamine (Adderall) 20 MG tablet     Sig: Take 1 tablet by mouth 3 (Three) Times a Day for 30 days. Indications: Attention Deficit Hyperactivity Disorder     Dispense:  90 tablet     Refill:  0     Do not fill at this time, patient has adequate supply       Return in about 5 weeks (around 2/15/2023) for schedule for Thurs 2/16/23 at 1 pm , medication  check, Next scheduled follow up.           I spent 37 minutes caring for Vickie on this date of service. This time includes time spent by me in the following activities: preparing for the visit, performing a medically appropriate examination and/or evaluation, counseling and educating the patient/family/caregiver, ordering medications, tests, or procedures, referring and communicating with other health care professionals, documenting information in the medical record and care coordination      This document has been electronically signed by NIKUNJ Neri  January 11, 2023 09:05 EST      Part of this note may be an electronic transcription/translation of spoken language to printed text using the Dragon Dictation System.

## 2023-01-23 DIAGNOSIS — F90.0 ADHD (ATTENTION DEFICIT HYPERACTIVITY DISORDER), INATTENTIVE TYPE: ICD-10-CM

## 2023-01-23 RX ORDER — DEXTROAMPHETAMINE SACCHARATE, AMPHETAMINE ASPARTATE, DEXTROAMPHETAMINE SULFATE AND AMPHETAMINE SULFATE 5; 5; 5; 5 MG/1; MG/1; MG/1; MG/1
20 TABLET ORAL 3 TIMES DAILY
Qty: 90 TABLET | Refills: 0 | Status: SHIPPED | OUTPATIENT
Start: 2023-01-23 | End: 2023-02-23 | Stop reason: SDUPTHER

## 2023-01-23 NOTE — TELEPHONE ENCOUNTER
Med Refill Request. Please review Pharmacy is [facility] Med pended  Please notice last Rx comment.

## 2023-02-16 ENCOUNTER — OFFICE VISIT (OUTPATIENT)
Dept: BEHAVIORAL HEALTH | Facility: CLINIC | Age: 53
End: 2023-02-16
Payer: COMMERCIAL

## 2023-02-16 ENCOUNTER — CLINICAL SUPPORT (OUTPATIENT)
Dept: FAMILY MEDICINE CLINIC | Age: 53
End: 2023-02-16
Payer: COMMERCIAL

## 2023-02-16 VITALS
HEIGHT: 66 IN | HEART RATE: 81 BPM | BODY MASS INDEX: 30.76 KG/M2 | DIASTOLIC BLOOD PRESSURE: 77 MMHG | WEIGHT: 191.4 LBS | SYSTOLIC BLOOD PRESSURE: 139 MMHG

## 2023-02-16 DIAGNOSIS — Z79.899 HIGH RISK MEDICATION USE: Primary | ICD-10-CM

## 2023-02-16 DIAGNOSIS — F90.0 ADHD (ATTENTION DEFICIT HYPERACTIVITY DISORDER), INATTENTIVE TYPE: ICD-10-CM

## 2023-02-16 DIAGNOSIS — Z79.899 CONTROLLED SUBSTANCE AGREEMENT SIGNED: ICD-10-CM

## 2023-02-16 DIAGNOSIS — Z79.899 HIGH RISK MEDICATION USE: ICD-10-CM

## 2023-02-16 LAB
AMPHET+METHAMPHET UR QL: POSITIVE
AMPHETAMINES UR QL: NEGATIVE
BARBITURATES UR QL SCN: NEGATIVE
BENZODIAZ UR QL SCN: NEGATIVE
BUPRENORPHINE SERPL-MCNC: NEGATIVE NG/ML
CANNABINOIDS SERPL QL: NEGATIVE
COCAINE UR QL: NEGATIVE
EXPIRATION DATE: ABNORMAL
Lab: ABNORMAL
MDMA UR QL SCN: NEGATIVE
METHADONE UR QL SCN: NEGATIVE
OPIATES UR QL: NEGATIVE
OXYCODONE UR QL SCN: NEGATIVE
PCP UR QL SCN: NEGATIVE

## 2023-02-16 PROCEDURE — 99213 OFFICE O/P EST LOW 20 MIN: CPT | Performed by: NURSE PRACTITIONER

## 2023-02-16 PROCEDURE — 80305 DRUG TEST PRSMV DIR OPT OBS: CPT | Performed by: FAMILY MEDICINE

## 2023-02-16 NOTE — PATIENT INSTRUCTIONS
"1.  Please return to clinic at your next scheduled visit.  Contact the [facility] ([phone]) or **Manjula, Medical Assistant at [place] Office directly at [phone] at least 24 hours prior in the event you need to cancel.**    2. Should you want to get in touch with your provider, NIKUNJ Neri, please contact MY Medical Assistant, Manjula, directly at [phone].  Recommend saving Manjula's direct number in phone as this is the PREFERRED & EASIEST way to get in contact with your provider.  Please leave a voice mail if you do not get an answer and she will return your call within 24 hrs. You will NOT be able to contact provider on [facility], as Behavioral Health Providers are restricted. YOU MUST CALL [phone]    If you need to speak with the on call provider after hours or on weekends, please Contact the [facility] ([phone]) and staff will be able to page the provider on call directly.        3, MEDICATION REFILLS:  PLEASE CALL THE PHARMACY TO REQUEST ALL MEDICATION REFILLS TO ENSURE YOU ARE RECEIVING YOUR MEDICATIONS IN A TIMELY MANNER.    IF YOU USE AN AUTOMATED SERVICE AT THE PHARMACY FOR REFILLS AND ARE TOLD THERE ARE \"NO REFILLS REMAINING\"   PLEASE CALL THE PHARMACY & SPEAK TO A LIVE PERSON TO VERIFY IT IS THE MOST UP TO DATE PRESCRIPTION ON FILE.    All new prescriptions will have a different number, therefore, if you were given refills for a medication today or at last visit it will not have the same number as the previous prescription.       4.  In the event you have personal crisis, contact the following crisis numbers: Suicide Prevention Hotline 1-112.407.2833 or *988, [facility] Helpline 3-271-447-KADIE; [facility] Emergency Room [phone]; text HELLO to 461163; or 009.      5. We would appreciate your feedback, please scan the QRS code on the back of your appointment card (or see below) and complete a brief survey.  [place] location is still not available, so " "please click \"[place]\" location.  Thank you      SPECIFIC RECOMMENDATIONS:     1.      Medications discussed at this encounter:                   - Request refill from prescription bottle when needed, last prescription dispensed 1/24/23     2.      Psychotherapy recommendations: Declined     3.     Return to clinic:  2 months    Please arrive at least 15 minutes before your scheduled appointment time to complete check in process.      IF you are scheduled for a [employer] VIDEO visit, PLEASE ANSWER YOUR PHONE WHEN OFFICE CALLS PRIOR TO VISIT TO COMPLETE THE CHECK IN PROCESS, EVEN IF THE E-CHECK IN WAS COMPLETED.     If you would like to log on to [employer] and complete the \"E-Check IN\" prior to your visit, please do so, this will speed up the check in process.  If you are due for questionnaires, you will find those on [employer] as well, please try to complete prior to your scheduled appointment.          "

## 2023-02-16 NOTE — PROGRESS NOTES
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 instructed to contact pharmacy when refill needed as patient is not due for refill today.  Will continue to send to [facility] pharmacy due to availability of Adderall as [facility] has been running out of medication.   Controlled substance documentation: Ryan reviewed; prior urine drug screen consistent obtained 2/15/22, UDS today as expected 2/16/23; consent is up to date, signed, witnessed and in EHR, dated 2/16/23, which will be updated annually per policy. Patient is aware of risk of addiction on this medication, understands need to follow up for a review every 3 months and medications will be adjusted or decreased as deemed appropriate at each visit.  No history of drug or alcohol abuse.  No concerns about diversion or abuse. Patient denies side effects related to the medication.  Patient is aware of random urine drug screens and pill counts. The dosing of this medication will be reviewed on a regular basis and reduced if possible..  Ongoing use of a controlled substance is necessary for this patient to have a normal quality of life.    5. Labs: POC UDS today in clinic, results positive for amphetamines as expected.     Patient encouraged to check prescription bottle description of tablet, as some pharmacies have been receiving Adderall IR from various manufactures, though the 2 should not be mixed in one bottle per prior discussions with pharmacy stafff.      Symptoms of ADHD are under good control with current dose of Adderall IR. Tolerating well, without reported side effects. Patient to contact provider if symptoms worsen or fail to improve.         1/11/23:   Continue Wellbutrin  mg by mouth  twice daily to target anxiety, attention & concentration deficit.     INCREASE Adderall IR FROM 20 mg by mouth 2 times daily (7am&6pm) AND one half tablet to equal 10 mg at midday at 12-1 pm TO 20 mg by mouth 3 TIMES DAILY to target attention and concentration deficit.  Last dispensed 12/28/22 #75/30 day supply.    Risks, benefits, side effects discussed with patient including elevated heart rate, elevated blood pressure, irritability, insomnia, sexual dysfunction, appetite suppressing properties, psychosis.  After discussion of these risks and benefits, the patient voiced understanding and agreed to proceed.  Patient instructed to contact MA directly in office when refill needed as patient is not due for refill today and has been instructed to start taking 3 of the 20 mg daily and will deplete current supply before 1/28/23.  Updated order in EHR, ordered as a NO PRINT.  Will continue to send to [facility] pharmacy due to availability of Adderall as [facility] has been running out of medication.     Patient to be seen in office for next visit to sign annual CSA and provide UDS.    Symptoms of ADHD are under fair control with current Adderall regimen, will increase afternoon/midday dose today, patient instructed to contact provider if unable to tolerate, denies side effects.  Morning and Evening doses of Adderall IR 20 mg have been effective thus far in management of ADHD symptoms.  Coordinated care with patient for an in office visit next month, patient instructed to arrive at [facility] at 1245 2/16/23 and provide urine sample and for in office visit, so patient can leave in timely manner to arrive to employer in [place]. Informed office staff of scheduling and no need  "to contact patient to set up appointment. Patient to contact provider if symptoms worsen or fail to improve.       12/27/22:  TELEPHONE  Patient called asking if she can be changed to something other than Adderall (\"since it is currently unavailable, or hard to find?\").  Please advise  Please advise her to contact various pharmacies to determine availability, such as [facility], [facility], [facility] are a few I can recall that have had medication in supply. She will have to be seen before switching medications, and I wouldn't advise switching as she has done well with Adderall and it is available, though some pharmacies are not getting at routine intervals.  But she will need to call around and see which pharmacies are able to fill medication.  It looks like [facility] has filled Adderall every month.   Called and advised patient of Mallorie's response.  Patient says she will call around and see where she can find it and call me back and let me know.  Patient called around and would like for you to cancel the Rx to [facility] and resend to [facility] on [address].(she says they have it in stock.  Please resend to the [facility].  Dr. Enrique is out this week, so I will send request to NIKUNJ Reyes, last dispensed 11/28/22, patient did not  recent order from 12/16/22.     12/6/22:  Declines therapy  Continue Wellbutrin  mg by mouth  twice daily to target anxiety, attention & concentration deficit.     Continue Adderall IR 20 mg by mouth 2 times daily (7am&6pm) AND one half tablet to equal 10 mg at midday at 12-1 pm to target attention and concentration deficit.  Last dispensed 11/28/22 #60/30 day supply.  Patient had requested short supply on 11/28/22, however, order was sent as previous prescription for twice daily dosing.  10/21/22 #75/30 days with changes. Risks, benefits, side effects discussed with patient including elevated heart rate, elevated blood pressure, irritability, insomnia, sexual dysfunction, appetite " suppressing properties, psychosis.  After discussion of these risks and benefits, the patient voiced understanding and agreed to proceed.  Patient instructed to notify provider at office no later than Thurs 12/15/22 so correct prescription is ordered.  Tolerating new dosing well with reported effectiveness.     Controlled substance documentation: Ryan reviewed; prior urine drug screen consistent obtained 2/15/22; consent is up to date, signed, witnessed and in EHR, dated 2/15/22, which will be updated annually per policy. Patient is aware of risk of addiction on this medication, understands need to follow up for a review every 3 months and medications will be adjusted or decreased as deemed appropriate at each visit.  No history of drug or alcohol abuse.  No concerns about diversion or abuse. Patient denies side effects related to the medication.  Patient is aware of random urine drug screens and pill counts. The dosing of this medication will be reviewed on a regular basis and reduced if possible..  Ongoing use of a controlled substance is necessary for this patient to have a normal quality of life.  Symptoms of anxiety and sleep disturbance appear to be chronic and unrelated to medications, as patient is working 60 hrs/week and going to school full time.  Symptoms of anxiety, ADHD are under good control with Wellbutrin and Adderall.  Patient to contact provider if symptoms worsen or fail to improve.     10/21/22:   Continue Wellbutrin  mg by mouth  twice daily to target anxiety, attention & concentration deficit.     Continue Adderall IR 20 mg by mouth twice daily to target attention and concentration deficit.  Last dispensed 9/16/22 #60/30 day supply. Instructed to continue 7 am dose and take 2nd dose later at 6 pm.   ADD Adderall IR 10 mg by mouth daily at 1 pm to target attention and concentration deficit. Risks, benefits, side effects discussed with patient including elevated heart rate, elevated blood  pressure, irritability, insomnia, sexual dysfunction, appetite suppressing properties, psychosis.  After discussion of these risks and benefits, the patient voiced understanding and agreed to proceed.   Informed patient order would be sent to Dr. Enrique in separate entry for signature due to EMR system, and will not see as refilled on [facility] today.  Controlled substance documentation: Ryan reviewed; prior urine drug screen consistent obtained 2/15/22; consent is up to date, signed, witnessed and in EHR, dated 2/15/22, which will be updated annually per policy. Patient is aware of risk of addiction on this medication, understands need to follow up for a review every 3 months and medications will be adjusted or decreased as deemed appropriate at each visit.  No history of drug or alcohol abuse.  No concerns about diversion or abuse. Patient denies side effects related to the medication.  Patient is aware of random urine drug screens and pill counts. The dosing of this medication will be reviewed on a regular basis and reduced if possible..  Ongoing use of a controlled substance is necessary for this patient to have a normal quality of life.  Collaborated with  regarding adding a 3rd dose of Adderall IR as patient day starts at 7 am and ends at 12 am with work and school schedule.  Will add 10 mg to take midday at 12-1 pm.        9/16/22:   Declines therapy  Continue Wellbutrin  mg by mouth  twice daily to target anxiety, attention & concentration deficit.   Continue Adderall IR 20 mg by mouth twice daily to target attention and concentration deficit.  Last dispensed 8/17/22 for 30 day supply #60 per   Will send order to  today. Will inquire of 3 month supply with , however, explained to patient a 90 day supply would not be ordered until stabilized on a maintanence dose, patient verbalized understanding.   Tolerating medications well, denies side effects, attention and concentration  has improved with increase dose.    Controlled substance documentation: Ryan reviewed; prior urine drug screen consistent obtained 2/15/22; consent is up to date, signed, witnessed and in EHR, dated 2/15/22, which will be updated annually per policy. Patient is aware of risk of addiction on this medication, understands need to follow up for a review every 3 months and medications will be adjusted or decreased as deemed appropriate at each visit.  No history of drug or alcohol abuse.  No concerns about diversion or abuse. Patient denies side effects related to the medication.  Patient is aware of random urine drug screens and pill counts. The dosing of this medication will be reviewed on a regular basis and reduced if possible..  Ongoing use of a controlled substance is necessary for this patient to have a normal quality of life.    8/15/22:   Continue Wellbutrin  mg by mouth  twice daily to target anxiety, attention & concentration deficit. Refilled today     Increase Adderall IR from 15 mg to 20 mg by mouth twice daily to target attention and concentration deficit.  Last dispensed 7/19/22 for 30 day supply #60 per   Will send order to  today and allow first fill 8/17/22.     Will increase Adderall IR to 20 mg twice daily as patient has been experiencing inability to complete tasks as she is shifting from one uncompleted tasks to another in between morning and mid afternoon doses of 15 mg IR.       7/11/22:   Continue Wellbutrin  mg by mouth  twice daily to target anxiety, attention & concentration deficit.      Continue Adderall IR 15 mg by mouth twice daily to target attention and concentration deficit.  Last dispensed 6/18/22 for 30 day supply #60 per   Patient doing well with current medication regimen will continue current medications and send update to  for refill of Adderall IR. Will see patient back in 5 weeks    6/13/22:   Patient tolerating Adderall 15 mg IR  twice daily without difficulty, symptoms are managed well currently. Will send message to  informing of toleration and refill request, patient was informed medication would not be available for refill until closer to 6/19/22, will have patient return in 4-5 weeks closer to refill date.   Continue Wellbutrin  mg by mouth  twice daily to target anxiety, attention & concentration deficit.    Continue Adderall IR 15 mg by mouth twice daily to target attention and concentration deficit.  Last dispensed 5/19/22 for 30 day supply #60 per     5/11/22:   Continue Wellbutrin  mg by mouth  twice daily to target anxiety, attention & concentration deficit.      Will stop Adderall XR 15 mg due to hot flash sensation and ineffectiveness.   Will plan on changing Adderall IR 10 mg by mouth every afternoon to Adderall IR 15 mg by mouth twice daily to target attention and concentration deficit.  Last IR dispensed 4/18/22, will send message to Dr. Enrique regarding recommendations and symptoms, patient informed medication would not be available for  until 5/18/22, patient verbalized understanding.       4/13/22:  Continue Wellbutrin  mg by mouth  twice daily to target anxiety, attention & concentration deficit.      Start Adderall XR 15 mg by mouth daily every morning and continue Adderall IR 10 mg by mouth every afternoon to target attention and concentration deficit.  Denies side effects of medication. Message sent to Dr. Enrique regarding an update of patient symptoms and plan.     3/15/22:   Increase Wellbutrin  mg by mouth daily to twice daily as patient reports at times taking twice daily to target anxiety, attention & concentration deficit.      Increase Adderall IR 10 mg by mouth from once daily to twice daily to target attention and concentration deficit, take one in the morning and the 2nd dose in afternoon approximately 6 hr apart.    Denies side effects of medication. Message sent  to Dr. Enrique regarding an update of patient symptoms and plan.     2/15/22:   -POC UDS  -CSA signed and reviewed with patient   -Continue Wellbutrin  mg by mouth daily to target anxiety, attention & concentration deficit.    -Adderall IR 5 mg by mouth twice daily to target attention and concentration deficit, take one in the morning and the 2nd dose in afternoon approximately 6 hr apart.  Risks, benefits, side effects discussed with patient including elevated heart rate, elevated blood pressure, irritability, insomnia, sexual dysfunction, appetite suppressing properties, psychosis.  After discussion of these risks and benefits, the patient voiced understanding and agreed to proceed. UDS ordered, Jovita reviewed. Informed patient medication would not be ordered until UDS results received, and would be ordered per Dr. Enrique.   Adderall IR 10 mg by mouth daily was ordered per  on 2/15/22      11/30/22:   Change Wellbutrin  mg twice daily to Wellbutrin  mg by mouth daily in the am to target anxiety, attention & concentration deficit.    ADHD testing referral to Al Cole with [facility] in [place], [place] as patient prefers to see provider closer to home.  List given of providers (4) for testing, patient to contact to schedule appointment.    Patient screened positive for depression based on a PHQ-9 score of 4 on 12/6/22. Follow-up recommendations include: Prescribed antidepressant medication treatment and Suicide Risk Assessment performed.       TREATMENT PLAN/GOALS: Continue supportive psychotherapy efforts and medications as indicated. Treatment and medication options discussed during today's visit. Patient acknowledged and verbally consented to continue with current treatment plan and was educated on the importance of compliance with treatment and follow-up appointments.    MEDICATION ISSUES:  JOVITA reviewed as expected.  Discussed medication options and treatment plan of  prescribed medication as well as the risks, benefits, and side effects including potential falls, possible impaired driving and metabolic adversities among others. Patient is agreeable to call the office with any worsening of symptoms or onset of side effects. Patient is agreeable to call 911 or go to the nearest ER should he/she begin having SI/HI. No medication side effects or related complaints today.     MEDS ORDERED DURING VISIT:  No orders of the defined types were placed in this encounter.      Return in about 2 months (around 4/16/2023) for Video visit, medication check.           I spent 28 minutes caring for Vickie on this date of service. This time includes time spent by me in the following activities: preparing for the visit, performing a medically appropriate examination and/or evaluation, counseling and educating the patient/family/caregiver, ordering medications, tests, or procedures, referring and communicating with other health care professionals, documenting information in the medical record and care coordination      This document has been electronically signed by NIKUNJ Neri  February 16, 2023 13:38 EST      Part of this note may be an electronic transcription/translation of spoken language to printed text using the Dragon Dictation System.

## 2023-02-23 DIAGNOSIS — F90.0 ADHD (ATTENTION DEFICIT HYPERACTIVITY DISORDER), INATTENTIVE TYPE: ICD-10-CM

## 2023-02-23 RX ORDER — DEXTROAMPHETAMINE SACCHARATE, AMPHETAMINE ASPARTATE, DEXTROAMPHETAMINE SULFATE AND AMPHETAMINE SULFATE 5; 5; 5; 5 MG/1; MG/1; MG/1; MG/1
20 TABLET ORAL 3 TIMES DAILY
Qty: 90 TABLET | Refills: 0 | Status: SHIPPED | OUTPATIENT
Start: 2023-02-23 | End: 2023-03-23 | Stop reason: SDUPTHER

## 2023-03-23 DIAGNOSIS — F90.0 ADHD (ATTENTION DEFICIT HYPERACTIVITY DISORDER), INATTENTIVE TYPE: ICD-10-CM

## 2023-03-23 RX ORDER — DEXTROAMPHETAMINE SACCHARATE, AMPHETAMINE ASPARTATE, DEXTROAMPHETAMINE SULFATE AND AMPHETAMINE SULFATE 5; 5; 5; 5 MG/1; MG/1; MG/1; MG/1
20 TABLET ORAL 3 TIMES DAILY
Qty: 90 TABLET | Refills: 0 | Status: SHIPPED | OUTPATIENT
Start: 2023-03-25 | End: 2023-04-24

## 2023-03-23 NOTE — TELEPHONE ENCOUNTER
Med Refill Request.  Patient has appt scheduled for 04/17/2023.  Med pended please send to [address]

## 2023-04-17 ENCOUNTER — TELEPHONE (OUTPATIENT)
Dept: BEHAVIORAL HEALTH | Facility: CLINIC | Age: 53
End: 2023-04-17

## 2023-04-17 ENCOUNTER — TELEMEDICINE (OUTPATIENT)
Dept: BEHAVIORAL HEALTH | Facility: CLINIC | Age: 53
End: 2023-04-17
Payer: COMMERCIAL

## 2023-04-17 DIAGNOSIS — F90.0 ADHD (ATTENTION DEFICIT HYPERACTIVITY DISORDER), INATTENTIVE TYPE: Primary | ICD-10-CM

## 2023-04-17 PROCEDURE — 99213 OFFICE O/P EST LOW 20 MIN: CPT | Performed by: NURSE PRACTITIONER

## 2023-04-17 NOTE — PATIENT INSTRUCTIONS
"1. Should you want to get in touch with your provider, NIKUNJ Neri, please contact MY Medical Assistant, Manjula, directly at [phone].  Recommend saving Manjula's direct number in phone as this is the PREFERRED & EASIEST way to get in contact with your provider.  Please leave a voice mail if you do not get an answer and she will return your call within 24 hrs. You will NOT be able to contact provider on [facility], as Behavioral Health Providers are restricted. YOU MUST CALL [phone]    If you need to speak with the on call provider after hours or on weekends, please Contact the [facility] ([phone]) and staff will be able to page the provider on call directly.      2.  In the event you need to cancel an appointment, please notify the office at least 24 hrs prior:   Contact **Manjula Medical Assistant at [facility] directly at [phone] or the [facility] ([phone])       3, MEDICATION REFILLS:  PLEASE CALL THE PHARMACY TO REQUEST ALL MEDICATION REFILLS TO ENSURE YOU ARE RECEIVING YOUR MEDICATIONS IN A TIMELY MANNER. The pharmacy will send this request ELECTRONICALLY to the ordering provider.     IF YOU USE AN AUTOMATED SERVICE AT THE PHARMACY FOR REFILLS AND ARE TOLD THERE ARE \"NO REFILLS REMAINING\"   PLEASE CALL THE PHARMACY & SPEAK TO A LIVE PERSON TO VERIFY IT IS THE MOST UP TO DATE PRESCRIPTION ON FILE.    All new prescriptions will have a different number, therefore, if you were given refills for a medication today or at last visit it will not have the same number as the previous prescription.       4.  In the event you have personal crisis, contact the following crisis numbers: Suicide Prevention Hotline 1-651.661.5035 or *988, KADIE Helpline 1-498-682-KADIE; [facility] Emergency Room [phone]; text HELLO to 587999; or 852.      5. We would appreciate your feedback, please scan the QRS code on the back of your appointment card (or see below) and complete " "a brief survey.  [place] location is still not available, so please click \"[place]\" location.  Thank you      SPECIFIC RECOMMENDATIONS:     1.      Medications discussed at this encounter:                   - Request refills when needed; earliest fill date for Adderall IR 20 mg 3/25/23, request no later than 4/24/23.      2.      Psychotherapy recommendations:  Declined     3.     Return to clinic: 3 months     Please arrive at least 15 minutes before your scheduled appointment time to complete check in process.      IF you are scheduled for a STAT-Diagnosticat VIDEO visit, YOU MUST COMPLETE THE \"E-CHECK IN\" PROCESS PRIOR TO BEGINNING THE VISIT, YOU WILL NO LONGER RECEIVE A PHONE CALL PRIOR TO ALL VIDEO VISITS          "

## 2023-04-17 NOTE — TELEPHONE ENCOUNTER
Called [facility] to callback and schedule a  follow up for 3 months video visit around July 17 or so.

## 2023-04-17 NOTE — PROGRESS NOTES
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 Protective factors include no family history, no present SI, no history of suicide attempts or self-harm in the past, minimal AODA, healthcare seeking, future orientation, willingness to engage in care.  Risk of self-harm chronically is also low, but could be further elevated in the event of treatment noncompliance and/or AODA.  4. Meds: Continue Wellbutrin  mg by mouth  twice daily to target anxiety, attention & concentration deficit.     Continue Adderall IR 20 mg by mouth 3 TIMES DAILY to target attention and concentration deficit, symptoms of ADHD.  Last dispensed 3/27/23 #90/30 day supply.  Risks, benefits, side effects discussed with patient including elevated heart rate, elevated blood pressure, irritability, insomnia, sexual dysfunction, appetite suppressing properties, psychosis.  After discussion of these risks and benefits, the patient voiced understanding and agreed to proceed.  Patient instructed to contact pharmacy when refill needed as patient is not due for refill today.  Will continue to send to [facility] pharmacy due to availability of Adderall as [facility] has been running out of medication.  Patient instructed to Request refills when needed; earliest fill date for Adderall IR 20 mg 3/25/23, request no later than 4/24/23.     Controlled substance documentation: Ryan reviewed; prior urine drug screen consistent obtained 2/16/23; consent is up to  date, signed, witnessed and in EHR, dated 2/16/23, which will be updated annually per policy. Patient is aware of risk of addiction on this medication, understands need to follow up for a review every 3 months and medications will be adjusted or decreased as deemed appropriate at each visit.  No history of drug or alcohol abuse.  No concerns about diversion or abuse. Patient denies side effects related to the medication.  Patient is aware of random urine drug screens and pill counts. The dosing of this medication will be reviewed on a regular basis and reduced if possible..  Ongoing use of a controlled substance is necessary for this patient to have a normal quality of life.    5. Labs: n/a    Symptoms of ADHD are under good control with current dose of Adderall IR. Tolerating well, without reported side effects. Patient to contact provider if symptoms worsen or fail to improve.       2/16/23:    Continue Wellbutrin  mg by mouth  twice daily to target anxiety, attention & concentration deficit.     Continue Adderall IR 20 mg by mouth 3 TIMES DAILY to target attention and concentration deficit, symptoms of ADHD.  Last dispensed 1/24/23 #90/30 day supply.  Risks, benefits, side effects discussed with patient including elevated heart rate, elevated blood pressure, irritability, insomnia, sexual dysfunction, appetite suppressing properties, psychosis.  After discussion of these risks and benefits, the patient voiced understanding and agreed to proceed.  Patient instructed to contact pharmacy when refill needed as patient is not due for refill today.  Will continue to send to [facility] pharmacy due to availability of Adderall as [facility] been running [facility] of [facility].   Controlled substance documentation: Ryan reviewed; prior urine drug screen consistent obtained 2/15/22, UDS today as expected 2/16/23; consent is up to date, signed, witnessed and in EHR, dated 2/16/23, which will be updated annually per policy.  Patient is aware of risk of addiction on this medication, understands need to follow up for a review every 3 months and medications will be adjusted or decreased as deemed appropriate at each visit.  No history of drug or alcohol abuse.  No concerns about diversion or abuse. Patient denies side effects related to the medication.  Patient is aware of random urine drug screens and pill counts. The dosing of this medication will be reviewed on a regular basis and reduced if possible..  Ongoing use of a controlled substance is necessary for this patient to have a normal quality of life.  POC UDS today in clinic, results positive for amphetamines as expected.     Patient encouraged to check prescription bottle description of tablet, as some pharmacies have been receiving Adderall IR from various manufactures, though the 2 should not be mixed in one bottle per prior discussions with pharmacy stafff.     Symptoms of ADHD are under good control with current dose of Adderall IR. Tolerating well, without reported side effects. Patient to contact provider if symptoms worsen or fail to improve.         1/11/23:   Continue Wellbutrin  mg by mouth  twice daily to target anxiety, attention & concentration deficit.     INCREASE Adderall IR FROM 20 mg by mouth 2 times daily (7am&6pm) AND one half tablet to equal 10 mg at midday at 12-1 pm TO 20 mg by mouth 3 TIMES DAILY to target attention and concentration deficit.  Last dispensed 12/28/22 #75/30 day supply.    Risks, benefits, side effects discussed with patient including elevated heart rate, elevated blood pressure, irritability, insomnia, sexual dysfunction, appetite suppressing properties, psychosis.  After discussion of these risks and benefits, the patient voiced understanding and agreed to proceed.  Patient instructed to contact MA directly in office when refill needed as patient is not due for refill today and has been instructed to start taking 3 of the 20 mg daily  "and will deplete current supply before 1/28/23.  Updated order in EHR, ordered as a NO PRINT.  Will continue to send to [facility] pharmacy due to availability of Adderall as [facility] has been running out of medication.     Patient to be seen in office for next visit to sign annual CSA and provide UDS.    Symptoms of ADHD are under fair control with current Adderall regimen, will increase afternoon/midday dose today, patient instructed to contact provider if unable to tolerate, denies side effects.  Morning and Evening doses of Adderall IR 20 mg have been effective thus far in management of ADHD symptoms.  Coordinated care with patient for an in office visit next month, patient instructed to arrive at [facility] at 1245 2/16/23 and provide urine sample and for in office visit, so patient can leave in timely manner to arrive to employer in [place]. Informed office staff of scheduling and no need to contact patient to set up appointment. Patient to contact provider if symptoms worsen or fail to improve.       12/27/22:  TELEPHONE  Patient called asking if she can be changed to something other than Adderall (\"since it is currently unavailable, or hard to find?\").  Please advise  Please advise her to contact various pharmacies to determine availability, such as [facility], [facility], [facility] are a few I can recall that have had medication in supply. She will have to be seen before switching medications, and I wouldn't advise switching as she has done well with Adderall and it is available, though some pharmacies are not getting at routine intervals.  But she will need to call around and see which pharmacies are able to fill medication.  It looks like [facility] has filled Adderall every month.   Called and advised patient of Mallorie's response.  Patient says she will call around and see where she can find it and call me back and let me know.  Patient called around and would like for you to cancel the Rx to [facility] and resend to [facility] on " [facility]she says they have it in stock.  Please resend to the [facility].  Dr. Enrique is out this week, so I will send request to NIKUNJ Reyes, last dispensed 11/28/22, patient did not  recent order from 12/16/22.     12/6/22:  Declines therapy  Continue Wellbutrin  mg by mouth  twice daily to target anxiety, attention & concentration deficit.     Continue Adderall IR 20 mg by mouth 2 times daily (7am&6pm) AND one half tablet to equal 10 mg at midday at 12-1 pm to target attention and concentration deficit.  Last dispensed 11/28/22 #60/30 day supply.  Patient had requested short supply on 11/28/22, however, order was sent as previous prescription for twice daily dosing.  10/21/22 #75/30 days with changes. Risks, benefits, side effects discussed with patient including elevated heart rate, elevated blood pressure, irritability, insomnia, sexual dysfunction, appetite suppressing properties, psychosis.  After discussion of these risks and benefits, the patient voiced understanding and agreed to proceed.  Patient instructed to notify provider at office no later than Thurs 12/15/22 so correct prescription is ordered.  Tolerating new dosing well with reported effectiveness.     Controlled substance documentation: Ryan reviewed; prior urine drug screen consistent obtained 2/15/22; consent is up to date, signed, witnessed and in EHR, dated 2/15/22, which will be updated annually per policy. Patient is aware of risk of addiction on this medication, understands need to follow up for a review every 3 months and medications will be adjusted or decreased as deemed appropriate at each visit.  No history of drug or alcohol abuse.  No concerns about diversion or abuse. Patient denies side effects related to the medication.  Patient is aware of random urine drug screens and pill counts. The dosing of this medication will be reviewed on a regular basis and reduced if possible..  Ongoing use of a controlled  substance is necessary for this patient to have a normal quality of life.  Symptoms of anxiety and sleep disturbance appear to be chronic and unrelated to medications, as patient is working 60 hrs/week and going to school full time.  Symptoms of anxiety, ADHD are under good control with Wellbutrin and Adderall.  Patient to contact provider if symptoms worsen or fail to improve.     10/21/22:   Continue Wellbutrin  mg by mouth  twice daily to target anxiety, attention & concentration deficit.     Continue Adderall IR 20 mg by mouth twice daily to target attention and concentration deficit.  Last dispensed 9/16/22 #60/30 day supply. Instructed to continue 7 am dose and take 2nd dose later at 6 pm.   ADD Adderall IR 10 mg by mouth daily at 1 pm to target attention and concentration deficit. Risks, benefits, side effects discussed with patient including elevated heart rate, elevated blood pressure, irritability, insomnia, sexual dysfunction, appetite suppressing properties, psychosis.  After discussion of these risks and benefits, the patient voiced understanding and agreed to proceed.   Informed patient order would be sent to Dr. Enrique in separate entry for signature due to EMR system, and will not see as refilled on [facility] today.  Controlled substance documentation: Ryan reviewed; prior urine drug screen consistent obtained 2/15/22; consent is up to date, signed, witnessed and in EHR, dated 2/15/22, which will be updated annually per policy. Patient is aware of risk of addiction on this medication, understands need to follow up for a review every 3 months and medications will be adjusted or decreased as deemed appropriate at each visit.  No history of drug or alcohol abuse.  No concerns about diversion or abuse. Patient denies side effects related to the medication.  Patient is aware of random urine drug screens and pill counts. The dosing of this medication will be reviewed on a regular basis and reduced if  possible..  Ongoing use of a controlled substance is necessary for this patient to have a normal quality of life.  Collaborated with  regarding adding a 3rd dose of Adderall IR as patient day starts at 7 am and ends at 12 am with work and school schedule.  Will add 10 mg to take midday at 12-1 pm.        9/16/22:   Declines therapy  Continue Wellbutrin  mg by mouth  twice daily to target anxiety, attention & concentration deficit.   Continue Adderall IR 20 mg by mouth twice daily to target attention and concentration deficit.  Last dispensed 8/17/22 for 30 day supply #60 per   Will send order to  today. Will inquire of 3 month supply with , however, explained to patient a 90 day supply would not be ordered until stabilized on a maintanence dose, patient verbalized understanding.   Tolerating medications well, denies side effects, attention and concentration has improved with increase dose.    Controlled substance documentation: Ryan reviewed; prior urine drug screen consistent obtained 2/15/22; consent is up to date, signed, witnessed and in EHR, dated 2/15/22, which will be updated annually per policy. Patient is aware of risk of addiction on this medication, understands need to follow up for a review every 3 months and medications will be adjusted or decreased as deemed appropriate at each visit.  No history of drug or alcohol abuse.  No concerns about diversion or abuse. Patient denies side effects related to the medication.  Patient is aware of random urine drug screens and pill counts. The dosing of this medication will be reviewed on a regular basis and reduced if possible..  Ongoing use of a controlled substance is necessary for this patient to have a normal quality of life.    8/15/22:   Continue Wellbutrin  mg by mouth  twice daily to target anxiety, attention & concentration deficit. Refilled today     Increase Adderall IR from 15 mg to 20 mg by mouth twice  daily to target attention and concentration deficit.  Last dispensed 7/19/22 for 30 day supply #60 per   Will send order to  today and allow first fill 8/17/22.     Will increase Adderall IR to 20 mg twice daily as patient has been experiencing inability to complete tasks as she is shifting from one uncompleted tasks to another in between morning and mid afternoon doses of 15 mg IR.       7/11/22:   Continue Wellbutrin  mg by mouth  twice daily to target anxiety, attention & concentration deficit.      Continue Adderall IR 15 mg by mouth twice daily to target attention and concentration deficit.  Last dispensed 6/18/22 for 30 day supply #60 per   Patient doing well with current medication regimen will continue current medications and send update to  for refill of Adderall IR. Will see patient back in 5 weeks    6/13/22:   Patient tolerating Adderall 15 mg IR twice daily without difficulty, symptoms are managed well currently. Will send message to  informing of toleration and refill request, patient was informed medication would not be available for refill until closer to 6/19/22, will have patient return in 4-5 weeks closer to refill date.   Continue Wellbutrin  mg by mouth  twice daily to target anxiety, attention & concentration deficit.    Continue Adderall IR 15 mg by mouth twice daily to target attention and concentration deficit.  Last dispensed 5/19/22 for 30 day supply #60 per     5/11/22:   Continue Wellbutrin  mg by mouth  twice daily to target anxiety, attention & concentration deficit.      Will stop Adderall XR 15 mg due to hot flash sensation and ineffectiveness.   Will plan on changing Adderall IR 10 mg by mouth every afternoon to Adderall IR 15 mg by mouth twice daily to target attention and concentration deficit.  Last IR dispensed 4/18/22, will send message to Dr. Enrique regarding recommendations and symptoms, patient informed  medication would not be available for  until 5/18/22, patient verbalized understanding.       4/13/22:  Continue Wellbutrin  mg by mouth  twice daily to target anxiety, attention & concentration deficit.      Start Adderall XR 15 mg by mouth daily every morning and continue Adderall IR 10 mg by mouth every afternoon to target attention and concentration deficit.  Denies side effects of medication. Message sent to Dr. Enrique regarding an update of patient symptoms and plan.     3/15/22:   Increase Wellbutrin  mg by mouth daily to twice daily as patient reports at times taking twice daily to target anxiety, attention & concentration deficit.      Increase Adderall IR 10 mg by mouth from once daily to twice daily to target attention and concentration deficit, take one in the morning and the 2nd dose in afternoon approximately 6 hr apart.    Denies side effects of medication. Message sent to Dr. Enrique regarding an update of patient symptoms and plan.     2/15/22:   -POC UDS  -CSA signed and reviewed with patient   -Continue Wellbutrin  mg by mouth daily to target anxiety, attention & concentration deficit.    -Adderall IR 5 mg by mouth twice daily to target attention and concentration deficit, take one in the morning and the 2nd dose in afternoon approximately 6 hr apart.  Risks, benefits, side effects discussed with patient including elevated heart rate, elevated blood pressure, irritability, insomnia, sexual dysfunction, appetite suppressing properties, psychosis.  After discussion of these risks and benefits, the patient voiced understanding and agreed to proceed. UDS ordered, Ryan reviewed. Informed patient medication would not be ordered until UDS results received, and would be ordered per Dr. Enrique.   Adderall IR 10 mg by mouth daily was ordered per  on 2/15/22      11/30/22:   Change Wellbutrin  mg twice daily to Wellbutrin  mg by mouth daily in the am to target  anxiety, attention & concentration deficit.    ADHD testing referral to Al Cole with [facility] in [place], [place] as patient prefers to see provider closer to home.  List given of providers (4) for testing, patient to contact to schedule appointment.    Patient screened positive for depression based on a PHQ-9 score of 4 on 12/6/22. Follow-up recommendations include: Prescribed antidepressant medication treatment and Suicide Risk Assessment performed.       TREATMENT PLAN/GOALS: Continue supportive psychotherapy efforts and medications as indicated. Treatment and medication options discussed during today's visit. Patient acknowledged and verbally consented to continue with current treatment plan and was educated on the importance of compliance with treatment and follow-up appointments.    MEDICATION ISSUES:  JOVITA reviewed as expected.  Discussed medication options and treatment plan of prescribed medication as well as the risks, benefits, and side effects including potential falls, possible impaired driving and metabolic adversities among others. Patient is agreeable to call the office with any worsening of symptoms or onset of side effects. Patient is agreeable to call 911 or go to the nearest ER should he/she begin having SI/HI. No medication side effects or related complaints today.     MEDS ORDERED DURING VISIT:  No orders of the defined types were placed in this encounter.      Return in about 3 months (around 7/17/2023) for Video visit, medication check.           I spent 21 minutes caring for Vickie on this date of service. This time includes time spent by me in the following activities: preparing for the visit, performing a medically appropriate examination and/or evaluation, counseling and educating the patient/family/caregiver, referring and communicating with other health care professionals, documenting information in the medical record and care coordination    Unable to complete visit  using a video connection to the patient. A phone visit was used to complete this visits. Total time of discussion was 7 minutes.    This document has been electronically signed by NIKUNJ eNri  April 17, 2023 11:38 EDT      Part of this note may be an electronic transcription/translation of spoken language to printed text using the Dragon Dictation System.

## 2023-04-21 ENCOUNTER — TRANSCRIBE ORDERS (OUTPATIENT)
Dept: ADMINISTRATIVE | Facility: HOSPITAL | Age: 53
End: 2023-04-21
Payer: COMMERCIAL

## 2023-04-21 DIAGNOSIS — Z12.39 OTHER SCREENING BREAST EXAMINATION: Primary | ICD-10-CM

## 2023-04-25 DIAGNOSIS — F90.0 ADHD (ATTENTION DEFICIT HYPERACTIVITY DISORDER), INATTENTIVE TYPE: ICD-10-CM

## 2023-04-25 RX ORDER — DEXTROAMPHETAMINE SACCHARATE, AMPHETAMINE ASPARTATE, DEXTROAMPHETAMINE SULFATE AND AMPHETAMINE SULFATE 5; 5; 5; 5 MG/1; MG/1; MG/1; MG/1
20 TABLET ORAL 3 TIMES DAILY
Qty: 90 TABLET | Refills: 0 | Status: SHIPPED | OUTPATIENT
Start: 2023-04-26 | End: 2023-05-26

## 2023-05-22 DIAGNOSIS — F90.0 ADHD (ATTENTION DEFICIT HYPERACTIVITY DISORDER), INATTENTIVE TYPE: ICD-10-CM

## 2023-05-22 NOTE — TELEPHONE ENCOUNTER
Patient called to request medication refill for her Adderall 20mg.  Patient has appt scheduled for 07/17/2023.  Please review Med pended

## 2023-05-23 RX ORDER — DEXTROAMPHETAMINE SACCHARATE, AMPHETAMINE ASPARTATE, DEXTROAMPHETAMINE SULFATE AND AMPHETAMINE SULFATE 5; 5; 5; 5 MG/1; MG/1; MG/1; MG/1
20 TABLET ORAL 3 TIMES DAILY
Qty: 90 TABLET | Refills: 0 | Status: SHIPPED | OUTPATIENT
Start: 2023-05-25 | End: 2023-06-24

## 2023-08-21 DIAGNOSIS — F90.0 ADHD (ATTENTION DEFICIT HYPERACTIVITY DISORDER), INATTENTIVE TYPE: ICD-10-CM

## 2023-08-21 RX ORDER — DEXTROAMPHETAMINE SACCHARATE, AMPHETAMINE ASPARTATE, DEXTROAMPHETAMINE SULFATE AND AMPHETAMINE SULFATE 5; 5; 5; 5 MG/1; MG/1; MG/1; MG/1
20 TABLET ORAL 3 TIMES DAILY
Qty: 90 TABLET | Refills: 0 | Status: SHIPPED | OUTPATIENT
Start: 2023-08-21 | End: 2023-09-20

## 2023-09-18 ENCOUNTER — TELEMEDICINE (OUTPATIENT)
Dept: BEHAVIORAL HEALTH | Facility: CLINIC | Age: 53
End: 2023-09-18
Payer: COMMERCIAL

## 2023-09-18 DIAGNOSIS — F90.0 ADHD (ATTENTION DEFICIT HYPERACTIVITY DISORDER), INATTENTIVE TYPE: ICD-10-CM

## 2023-09-18 DIAGNOSIS — Z79.899 MEDICATION MANAGEMENT: ICD-10-CM

## 2023-09-18 DIAGNOSIS — F90.0 ADHD (ATTENTION DEFICIT HYPERACTIVITY DISORDER), INATTENTIVE TYPE: Primary | ICD-10-CM

## 2023-09-18 RX ORDER — DEXTROAMPHETAMINE SACCHARATE, AMPHETAMINE ASPARTATE, DEXTROAMPHETAMINE SULFATE AND AMPHETAMINE SULFATE 5; 5; 5; 5 MG/1; MG/1; MG/1; MG/1
20 TABLET ORAL 3 TIMES DAILY
Qty: 270 TABLET | Refills: 0 | Status: SHIPPED | OUTPATIENT
Start: 2023-09-19 | End: 2023-09-25 | Stop reason: SDUPTHER

## 2023-09-18 NOTE — PROGRESS NOTES
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 normal.       Vital Signs:   There were no vitals taken for this visit.     Lab Results:   No visits with results within 6 Month(s) from this visit.   Latest known visit with results is:   Clinical Support on 02/16/2023   Component Date Value Ref Range Status    Amphetamine Screen, Urine 02/16/2023 Positive (A)  Negative Final    Barbiturates Screen, Urine 02/16/2023 Negative  Negative Final    Buprenorphine, Screen, Urine 02/16/2023 Negative  Negative Final    Benzodiazepine Screen, Urine 02/16/2023 Negative  Negative Final    Cocaine Screen, Urine 02/16/2023 Negative  Negative Final    MDMA (ECSTASY) 02/16/2023 Negative  Negative Final    Methamphetamine, Ur 02/16/2023 Negative  Negative Final    Methadone Screen, Urine 02/16/2023 Negative  Negative Final    Opiate Screen 02/16/2023 Negative  Negative Final    Oxycodone Screen, Urine 02/16/2023 Negative  Negative Final    Phencyclidine (PCP), Urine 02/16/2023 Negative  Negative Final    THC, Screen, Urine 02/16/2023 Negative  Negative Final    Lot Number 02/16/2023 [id number]   Final    Expiration Date 02/16/2023 3/31/24   Final       EKG Results:  No orders to display       Imaging Results:  No Images in the past 120 days found..      Assessment & Plan   Diagnoses and all orders for this visit:    1. ADHD (attention deficit hyperactivity disorder), inattentive type (Primary)    2. Medication management          Visit Diagnoses:    ICD-10-CM ICD-9-CM   1. ADHD  (attention deficit hyperactivity disorder), inattentive type  F90.0 314.00   2. Medication management  Z79.899 V58.69         PLAN:  Safety: No acute safety concerns  Therapy: Declines  Risk Assessment: Risk of self-harm acutely is low.  Risk factors include anxiety disorder, access to guns/weapons, and recent psychosocial stressors (pandemic). Protective factors include no family history, no present SI, no history of suicide attempts or self-harm in the past, minimal AODA, healthcare seeking, future orientation, willingness to engage in care.  Risk of self-harm chronically is also low, but could be further elevated in the event of treatment noncompliance and/or AODA.  Meds: Continue Wellbutrin  mg by mouth  twice daily to target  ADHD and mood.     Continue Adderall IR 20 mg by mouth 3 TIMES DAILY to target attention and concentration deficit, symptoms of ADHD.  Last dispensed 8/21/23 #90/30 day supply.  Risks, benefits, side effects discussed with patient including elevated heart rate, elevated blood pressure, irritability, insomnia, sexual dysfunction, appetite suppressing properties, psychosis.  After discussion of these risks and benefits, the patient voiced understanding and agreed to proceed. Will send in a 90 day supply to [facility] pharmacy for insurance, cost, and availability.  Informed patient order would be sent to Dr. Enrique in separate entry for signature due to EMR system, and will not see as refilled on AVS today.     Controlled substance documentation: Ryan reviewed; prior urine drug screen consistent obtained 2/16/23; consent is up to date, signed, witnessed and in EHR, dated 2/16/23, which will be updated annually per policy. Patient is aware of risk of addiction on this medication, understands need to follow up for a review every 3 months and medications will be adjusted or decreased as deemed appropriate at each visit.  No history of drug or alcohol abuse.  No concerns  about diversion or abuse. Patient denies side effects related to the medication.  Patient is aware of random urine drug screens and pill counts. The dosing of this medication will be reviewed on a regular basis and reduced if possible. Ongoing use of a controlled substance is necessary for this patient to have a normal quality of life.    Labs: n/a    Symptoms of ADHD are under good control with current dose of Adderall IR, Patient to contact provider if symptoms worsen or fail to improve.        7/17/23:   -Continue Wellbutrin  mg by mouth  twice daily to target  ADHD and mood. Refilled today  -Continue Adderall IR 20 mg by mouth 3 TIMES DAILY to target attention and concentration deficit, symptoms of ADHD.  Last dispensed 6/23/23 #90/30 day supply.  Will continue to send to [facility] pharmacy due to availability of Adderall as [facility] has been running out of medication.  Patient instructed to Request refills when needed; earliest fill date for Adderall IR 20 mg 6/22/23.     Controlled substance documentation: Ryan reviewed; prior urine drug screen consistent obtained 2/16/23; consent is up to date, signed, witnessed and in EHR, dated 2/16/23, which will be updated annually per policy. Patient is aware of risk of addiction on this medication, understands need to follow up for a review every 3 months and medications will be adjusted or decreased as deemed appropriate at each visit.  No history of drug or alcohol abuse.  No concerns about diversion or abuse. Patient denies side effects related to the medication.  Patient is aware of random urine drug screens and pill counts. The dosing of this medication will be reviewed on a regular basis and reduced if possible. Ongoing use of a controlled substance is necessary for this patient to have a normal quality of life.  Symptoms of ADHD are under good control with current medication regimen.   Patient was given instructions and counseling regarding condition and for  health maintenance advice. Please see specific information pulled into the AVS if appropriate.    Patient to contact provider if symptoms worsen or fail to improve.      4/17/23:  -Continue Wellbutrin  mg by mouth  twice daily to target anxiety, attention & concentration deficit.   -Continue Adderall IR 20 mg by mouth 3 TIMES DAILY to target attention and concentration deficit, symptoms of ADHD.  Last dispensed 3/27/23 #90/30 day supply.  Risks, benefits, side effects discussed with patient including elevated heart rate, elevated blood pressure, irritability, insomnia, sexual dysfunction, appetite suppressing properties, psychosis.  After discussion of these risks and benefits, the patient voiced understanding and agreed to proceed.  Patient instructed to contact pharmacy when refill needed as patient is not due for refill today.  Will continue to send to [facility] pharmacy due to availability of Adderall as [facility] has been running out of medication.  Patient instructed to Request refills when needed; earliest fill date for Adderall IR 20 mg 3/25/23, request no later than 4/24/23.   Symptoms of ADHD are under good control with current dose of Adderall IR. Tolerating well, without reported side effects. Patient to contact provider if symptoms worsen or fail to improve.       2/16/23:    Continue Wellbutrin  mg by mouth twice daily to target anxiety, attention & concentration deficit.     Continue Adderall IR 20 mg by mouth 3 TIMES DAILY to target attention and concentration deficit, symptoms of ADHD.  Last dispensed 1/24/23 #90/30 day supply.  Risks, benefits, side effects discussed with patient including elevated heart rate, elevated blood pressure, irritability, insomnia, sexual dysfunction, appetite suppressing properties, psychosis.  After discussion of these risks and benefits, the patient voiced understanding and agreed to proceed.  Patient instructed to contact pharmacy when refill needed as patient is  not due for refill today.  Will continue to send to [facility] pharmacy due to availability of Adderall as [facility] been running [facility] of medication.   Controlled substance documentation: Ryan reviewed; prior urine drug screen consistent obtained 2/15/22, UDS today as expected 2/16/23; consent is up to date, signed, witnessed and in EHR, dated 2/16/23, which will be updated annually per policy. Patient is aware of risk of addiction on this medication, understands need to follow up for a review every 3 months and medications will be adjusted or decreased as deemed appropriate at each visit.  No history of drug or alcohol abuse.  No concerns about diversion or abuse. Patient denies side effects related to the medication.  Patient is aware of random urine drug screens and pill counts. The dosing of this medication will be reviewed on a regular basis and reduced if possible..  Ongoing use of a controlled substance is necessary for this patient to have a normal quality of life.  POC UDS today in clinic, results positive for amphetamines as expected.     Patient encouraged to check prescription bottle description of tablet, as some pharmacies have been receiving Adderall IR from various manufactures, though the 2 should not be mixed in one bottle per prior discussions with pharmacy stafff.     Symptoms of ADHD are under good control with current dose of Adderall IR. Tolerating well, without reported side effects. Patient to contact provider if symptoms worsen or fail to improve.         1/11/23:   Continue Wellbutrin  mg by mouth  twice daily to target anxiety, attention & concentration deficit.     INCREASE Adderall IR FROM 20 mg by mouth 2 times daily (7am&6pm) AND one half tablet to equal 10 mg at midday at 12-1 pm TO 20 mg by mouth 3 TIMES DAILY to target attention and concentration deficit.  Last dispensed 12/28/22 #75/30 day supply.    Risks, benefits, side effects discussed with patient including elevated heart  "rate, elevated blood pressure, irritability, insomnia, sexual dysfunction, appetite suppressing properties, psychosis.  After discussion of these risks and benefits, the patient voiced understanding and agreed to proceed.  Patient instructed to contact MA directly in office when refill needed as patient is not due for refill today and has been instructed to start taking 3 of the 20 mg daily and will deplete current supply before 1/28/23.  Updated order in EHR, ordered as a NO PRINT.  Will continue to send to [facility] pharmacy due to availability of Adderall as [facility] been [facility] of [facility].     Patient to be seen in office for next visit to sign annual CSA and provide UDS.    Symptoms of ADHD are under fair control with current Adderall regimen, will increase afternoon/midday dose today, patient instructed to contact provider if unable to tolerate, denies side effects.  Morning and Evening doses of Adderall IR 20 mg have been effective thus far in management of ADHD symptoms.  Coordinated care with patient for an in office visit next month, patient instructed to arrive at [facility] at 1245 2/16/23 and provide urine sample and for in office visit, so patient can leave in timely manner to arrive to employer in [place]. Informed office staff of scheduling and no need to contact patient to set up appointment. Patient to contact provider if symptoms worsen or fail to improve.       12/27/22:  TELEPHONE  Patient called asking if she can be changed to something other than Adderall (\"since it is currently unavailable, or hard to find?\").  Please advise  Please advise her to contact various pharmacies to determine availability, such as [facility], [facility], [facility] are a few I can recall that have had medication in supply. She will have to be seen before switching medications, and I wouldn't advise switching as she has done well with Adderall and it is available, though some pharmacies are not getting at routine " intervals.  But she will need to call around and see which pharmacies are able to fill medication.  It looks like [facility] has filled Adderall every month.   Called and advised patient of Mallorie's response.  Patient says she will call around and see where she can find it and call me back and let me know.  Patient called around and would like for you to [facility] and [facility] to [facility] [address]she says they have it in stock.  [facility]  Dr. Enrique is out this week, so I will send request to NIKUNJ Reyes, last dispensed 11/28/22, patient did not  recent order from 12/16/22.     12/6/22:  Declines therapy  Continue Wellbutrin  mg by mouth  twice daily to target anxiety, attention & concentration deficit.     Continue Adderall IR 20 mg by mouth 2 times daily (7am&6pm) AND one half tablet to equal 10 mg at midday at 12-1 pm to target attention and concentration deficit.  Last dispensed 11/28/22 #60/30 day supply.  Patient had requested short supply on 11/28/22, however, order was sent as previous prescription for twice daily dosing.  10/21/22 #75/30 days with changes. Risks, benefits, side effects discussed with patient including elevated heart rate, elevated blood pressure, irritability, insomnia, sexual dysfunction, appetite suppressing properties, psychosis.  After discussion of these risks and benefits, the patient voiced understanding and agreed to proceed.  Patient instructed to notify provider at office no later than Thurs 12/15/22 so correct prescription is ordered.  Tolerating new dosing well with reported effectiveness.     Controlled substance documentation: Ryan reviewed; prior urine drug screen consistent obtained 2/15/22; consent is up to date, signed, witnessed and in EHR, dated 2/15/22, which will be updated annually per policy. Patient is aware of risk of addiction on this medication, understands need to follow up for a review every 3 months and  medications will be adjusted or decreased as deemed appropriate at each visit.  No history of drug or alcohol abuse.  No concerns about diversion or abuse. Patient denies side effects related to the medication.  Patient is aware of random urine drug screens and pill counts. The dosing of this medication will be reviewed on a regular basis and reduced if possible..  Ongoing use of a controlled substance is necessary for this patient to have a normal quality of life.  Symptoms of anxiety and sleep disturbance appear to be chronic and unrelated to medications, as patient is working 60 hrs/week and going to school full time.  Symptoms of anxiety, ADHD are under good control with Wellbutrin and Adderall.  Patient to contact provider if symptoms worsen or fail to improve.     10/21/22:   Continue Wellbutrin  mg by mouth  twice daily to target anxiety, attention & concentration deficit.     Continue Adderall IR 20 mg by mouth twice daily to target attention and concentration deficit.  Last dispensed 9/16/22 #60/30 day supply. Instructed to continue 7 am dose and take 2nd dose later at 6 pm.   ADD Adderall IR 10 mg by mouth daily at 1 pm to target attention and concentration deficit. Risks, benefits, side effects discussed with patient including elevated heart rate, elevated blood pressure, irritability, insomnia, sexual dysfunction, appetite suppressing properties, psychosis.  After discussion of these risks and benefits, the patient voiced understanding and agreed to proceed.   Informed patient order would be sent to Dr. Enrique in separate entry for signature due to EMR system, and will not see as refilled on AVS today.  Controlled substance documentation: Ryan reviewed; prior urine drug screen consistent obtained 2/15/22; consent is up to date, signed, witnessed and in EHR, dated 2/15/22, which will be updated annually per policy. Patient is aware of risk of addiction on this medication, understands need to follow  up for a review every 3 months and medications will be adjusted or decreased as deemed appropriate at each visit.  No history of drug or alcohol abuse.  No concerns about diversion or abuse. Patient denies side effects related to the medication.  Patient is aware of random urine drug screens and pill counts. The dosing of this medication will be reviewed on a regular basis and reduced if possible..  Ongoing use of a controlled substance is necessary for this patient to have a normal quality of life.  Collaborated with  regarding adding a 3rd dose of Adderall IR as patient day starts at 7 am and ends at 12 am with work and school schedule.  Will add 10 mg to take midday at 12-1 pm.        9/16/22:   Declines therapy  Continue Wellbutrin  mg by mouth  twice daily to target anxiety, attention & concentration deficit.   Continue Adderall IR 20 mg by mouth twice daily to target attention and concentration deficit.  Last dispensed 8/17/22 for 30 day supply #60 per   Will send order to  today. Will inquire of 3 month supply with , however, explained to patient a 90 day supply would not be ordered until stabilized on a maintanence dose, patient verbalized understanding.   Tolerating medications well, denies side effects, attention and concentration has improved with increase dose.    Controlled substance documentation: Ryan reviewed; prior urine drug screen consistent obtained 2/15/22; consent is up to date, signed, witnessed and in EHR, dated 2/15/22, which will be updated annually per policy. Patient is aware of risk of addiction on this medication, understands need to follow up for a review every 3 months and medications will be adjusted or decreased as deemed appropriate at each visit.  No history of drug or alcohol abuse.  No concerns about diversion or abuse. Patient denies side effects related to the medication.  Patient is aware of random urine drug screens and pill counts.  The dosing of this medication will be reviewed on a regular basis and reduced if possible..  Ongoing use of a controlled substance is necessary for this patient to have a normal quality of life.    8/15/22:   Continue Wellbutrin  mg by mouth  twice daily to target anxiety, attention & concentration deficit. Refilled today     Increase Adderall IR from 15 mg to 20 mg by mouth twice daily to target attention and concentration deficit.  Last dispensed 7/19/22 for 30 day supply #60 per   Will send order to  today and allow first fill 8/17/22.     Will increase Adderall IR to 20 mg twice daily as patient has been experiencing inability to complete tasks as she is shifting from one uncompleted tasks to another in between morning and mid afternoon doses of 15 mg IR.       7/11/22:   Continue Wellbutrin  mg by mouth  twice daily to target anxiety, attention & concentration deficit.      Continue Adderall IR 15 mg by mouth twice daily to target attention and concentration deficit.  Last dispensed 6/18/22 for 30 day supply #60 per   Patient doing well with current medication regimen will continue current medications and send update to  for refill of Adderall IR. Will see patient back in 5 weeks    6/13/22:   Patient tolerating Adderall 15 mg IR twice daily without difficulty, symptoms are managed well currently. Will send message to  informing of toleration and refill request, patient was informed medication would not be available for refill until closer to 6/19/22, will have patient return in 4-5 weeks closer to refill date.   Continue Wellbutrin  mg by mouth  twice daily to target anxiety, attention & concentration deficit.    Continue Adderall IR 15 mg by mouth twice daily to target attention and concentration deficit.  Last dispensed 5/19/22 for 30 day supply #60 per     5/11/22:   Continue Wellbutrin  mg by mouth  twice daily to target anxiety,  attention & concentration deficit.      Will stop Adderall XR 15 mg due to hot flash sensation and ineffectiveness.   Will plan on changing Adderall IR 10 mg by mouth every afternoon to Adderall IR 15 mg by mouth twice daily to target attention and concentration deficit.  Last IR dispensed 4/18/22, will send message to Dr. Enrique regarding recommendations and symptoms, patient informed medication would not be available for  until 5/18/22, patient verbalized understanding.       4/13/22:  Continue Wellbutrin  mg by mouth  twice daily to target anxiety, attention & concentration deficit.      Start Adderall XR 15 mg by mouth daily every morning and continue Adderall IR 10 mg by mouth every afternoon to target attention and concentration deficit.  Denies side effects of medication. Message sent to Dr. Enrique regarding an update of patient symptoms and plan.     3/15/22:   Increase Wellbutrin  mg by mouth daily to twice daily as patient reports at times taking twice daily to target anxiety, attention & concentration deficit.      Increase Adderall IR 10 mg by mouth from once daily to twice daily to target attention and concentration deficit, take one in the morning and the 2nd dose in afternoon approximately 6 hr apart.    Denies side effects of medication. Message sent to Dr. Enrique regarding an update of patient symptoms and plan.     2/15/22:   -POC UDS  -CSA signed and reviewed with patient   -Continue Wellbutrin  mg by mouth daily to target anxiety, attention & concentration deficit.    -Adderall IR 5 mg by mouth twice daily to target attention and concentration deficit, take one in the morning and the 2nd dose in afternoon approximately 6 hr apart.  Risks, benefits, side effects discussed with patient including elevated heart rate, elevated blood pressure, irritability, insomnia, sexual dysfunction, appetite suppressing properties, psychosis.  After discussion of these risks and benefits, the  patient voiced understanding and agreed to proceed. UDS ordered, Jovita reviewed. Informed patient medication would not be ordered until UDS results received, and would be ordered per Dr. Enrique.   Adderall IR 10 mg by mouth daily was ordered per  on 2/15/22      11/30/22:   Change Wellbutrin  mg twice daily to Wellbutrin  mg by mouth daily in the am to target anxiety, attention & concentration deficit.    ADHD testing referral to Al Cole with [facility] in [place], [place] as patient prefers to see provider closer to home.  List given of providers (4) for testing, patient to contact to schedule appointment.    Patient screened positive for depression based on a PHQ-9 score of 0 on 7/17/2023. Follow-up recommendations include: Prescribed antidepressant medication treatment and Suicide Risk Assessment performed.       TREATMENT PLAN/GOALS: Continue supportive psychotherapy efforts and medications as indicated. Treatment and medication options discussed during today's visit. Patient acknowledged and verbally consented to continue with current treatment plan and was educated on the importance of compliance with treatment and follow-up appointments.    MEDICATION ISSUES:  JOVITA reviewed as expected.  Discussed medication options and treatment plan of prescribed medication as well as the risks, benefits, and side effects including potential falls, possible impaired driving and metabolic adversities among others. Patient is agreeable to call the office with any worsening of symptoms or onset of side effects. Patient is agreeable to call 911 or go to the nearest ER should he/she begin having SI/HI. No medication side effects or related complaints today.     MEDS ORDERED DURING VISIT:  No orders of the defined types were placed in this encounter.      Return in about 3 months (around 12/18/2023) for Video visit, medication check.          I spent 19 minutes caring for Vickie on this date of  service. This time includes time spent by me in the following activities: preparing for the visit, performing a medically appropriate examination and/or evaluation, counseling and educating the patient/family/caregiver, referring and communicating with other health care professionals, documenting information in the medical record, care coordination, and prescription drug management         This document has been electronically signed by NIKUNJ Neri  September 18, 2023 08:47 EDT      Part of this note may be an electronic transcription/translation of spoken language to printed text using the Dragon Dictation System.

## 2023-09-18 NOTE — PATIENT INSTRUCTIONS
"1. Should you want to get in touch with your provider, NIKUNJ Neri, please contact MY Medical Assistant, Manjula, directly at [phone].  Recommend saving Manjula's direct number in phone as this is the PREFERRED & EASIEST way to get in contact with your provider.  Please leave a voice mail if you do not get an answer and she will return your call within 24 hrs. You will NOT be able to contact provider on [employer], as Behavioral Health Providers are restricted. YOU MUST CALL [phone]  If you need to speak with the on call provider after hours or on weekends, please Contact the [facility] ([phone]) and staff will be able to page the provider on call directly.     2.  In the event you need to cancel an appointment, please notify the office at least 24 hrs prior:   Contact **Manjula Medical Assistant at [facility] directly at [phone] or the [facility] ([phone])     3. MEDICATION REFILLS:  PLEASE CALL THE PHARMACY TO REQUEST ALL MEDICATION REFILLS or via [employer] TO ENSURE YOU ARE RECEIVING YOUR MEDICATIONS IN A TIMELY MANNER. The pharmacy or [employer] will send this request ELECTRONICALLY to the ordering provider.   IF YOU USE AN AUTOMATED SERVICE AT THE PHARMACY FOR REFILLS AND ARE TOLD THERE ARE \"NO REFILLS REMAINING\"   PLEASE CALL THE PHARMACY & SPEAK TO A LIVE PERSON TO VERIFY IT IS THE MOST UP TO DATE PRESCRIPTION ON FILE.    All new prescriptions will have a different number, therefore, if you were given refills for a medication today or at last visit it will not have the same number as the previous prescription.     4.  In the event you have personal crisis, contact the following crisis numbers: Suicide Prevention Hotline 1-684.693.2610 or *988, [facility] Helpline 7-566-924-YFYK; [facility] [phone]; text HELLO to 123496; or 911.  If you feel like harming yourself or others, call 911 right away.  You can call the 985 Suicide and Crisis " "Lifeline at  988   to speak with a counselor at the [facility], or you can connect with one using their online chat  .    5.  Never stop an antidepressant medicine without first talking to a healthcare provider. Suddenly stopping this type of medication can cause withdrawal symptoms.    6.  Counseling and talk therapy  Counseling or therapy teaches you new coping skills and more adaptive ways of thinking about problems. These tools can help you make positive changes. The benefits of counseling often last long after treatment sessions have stopped.    7.   We would appreciate your feedback, please scan the QRS code on the back of your appointment card (or see below) and complete a brief survey.  [place] location is still not available, so please click \"[place]\" location.  Thank you      SPECIFIC RECOMMENDATIONS:     1.      Medications discussed at this encounter:                   -  Adderall IR 20 mg by mouth 3 times daily will be sent to Dr. Enrique for signature today for 90 day supply to [facility] Mail order pharmacy.     2.      Psychotherapy recommendations:Declined     3.     Return to clinic: 3 months, Monday 12/18/23 at 0840 via video    Please arrive at least 15 minutes before your scheduled appointment time to complete check in process.      IF you are scheduled for a [facility] VIDEO visit, YOU MUST COMPLETE THE \"E-CHECK IN\" PROCESS PRIOR TO BEGINNING THE VISIT, YOU WILL NO LONGER RECEIVE A PHONE CALL PRIOR TO ALL VIDEO VISITS; You may still complete the E-Check in for in office visits prior to appointment, you will receive multiple text/email reminders which will direct you further if needed.           "

## 2023-09-22 ENCOUNTER — HOSPITAL ENCOUNTER (OUTPATIENT)
Dept: MAMMOGRAPHY | Facility: HOSPITAL | Age: 53
Discharge: HOME | End: 2023-09-22
Payer: COMMERCIAL

## 2023-09-22 DIAGNOSIS — Z12.39 OTHER SCREENING BREAST EXAMINATION: ICD-10-CM

## 2023-09-22 DIAGNOSIS — F90.0 ADHD (ATTENTION DEFICIT HYPERACTIVITY DISORDER), INATTENTIVE TYPE: ICD-10-CM

## 2023-09-22 PROCEDURE — 77067 SCR MAMMO BI INCL CAD: CPT

## 2023-09-22 PROCEDURE — 77063 BREAST TOMOSYNTHESIS BI: CPT

## 2023-09-22 NOTE — TELEPHONE ENCOUNTER
Patient called to report that the mail-order pharmacy for 90 day supply of Adderall 20mg tablets costs the same $300 so now patient is requesting a 30 day supply be sent to [facility] on [address] in [place], [place].  Please advise

## 2023-09-25 RX ORDER — DEXTROAMPHETAMINE SACCHARATE, AMPHETAMINE ASPARTATE, DEXTROAMPHETAMINE SULFATE AND AMPHETAMINE SULFATE 5; 5; 5; 5 MG/1; MG/1; MG/1; MG/1
20 TABLET ORAL 3 TIMES DAILY
Qty: 90 TABLET | Refills: 0 | Status: SHIPPED | OUTPATIENT
Start: 2023-09-25 | End: 2023-10-25

## 2023-10-25 DIAGNOSIS — F90.0 ADHD (ATTENTION DEFICIT HYPERACTIVITY DISORDER), INATTENTIVE TYPE: ICD-10-CM

## 2023-10-25 RX ORDER — DEXTROAMPHETAMINE SACCHARATE, AMPHETAMINE ASPARTATE, DEXTROAMPHETAMINE SULFATE AND AMPHETAMINE SULFATE 5; 5; 5; 5 MG/1; MG/1; MG/1; MG/1
20 TABLET ORAL 3 TIMES DAILY
Qty: 90 TABLET | Refills: 0 | Status: SHIPPED | OUTPATIENT
Start: 2023-10-25 | End: 2023-11-24

## 2023-10-25 NOTE — TELEPHONE ENCOUNTER
Patient LMVM needing a refill for her Adderall.(Remember 90 day supply was not affordable so back to 30 day supply) Med pended please review and update sig.  Patient has appt scheduled for 12/18/2023

## 2023-11-03 ENCOUNTER — TELEPHONE (OUTPATIENT)
Dept: BEHAVIORAL HEALTH | Facility: CLINIC | Age: 53
End: 2023-11-03
Payer: COMMERCIAL

## 2023-11-03 NOTE — TELEPHONE ENCOUNTER
Called patient and let her know she said she will have to  another form at [facility] Monday and have a friend drop it off her at the [place] office because it has to be an original form.

## 2023-11-03 NOTE — TELEPHONE ENCOUNTER
Returned patient's call to get clarification of what exactly was needed. Patient stated she is needing same as last year around this time.  In message from 11/11/2022.  However we do not have a form to fill out and she would like this mailed to her when complete.  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  Please advise.  When form from last year was scanned in it was not scanned blank.  Can you do a letter Please advise. Works for [employer]

## 2023-11-03 NOTE — TELEPHONE ENCOUNTER
Suspect employer will require the same designated form, advise patient to contact employer for an updated form, and to leave form blank, and either patient or employer can fax directly to this office.

## 2023-11-27 DIAGNOSIS — F90.0 ADHD (ATTENTION DEFICIT HYPERACTIVITY DISORDER), INATTENTIVE TYPE: ICD-10-CM

## 2023-11-28 RX ORDER — DEXTROAMPHETAMINE SACCHARATE, AMPHETAMINE ASPARTATE, DEXTROAMPHETAMINE SULFATE AND AMPHETAMINE SULFATE 5; 5; 5; 5 MG/1; MG/1; MG/1; MG/1
20 TABLET ORAL 3 TIMES DAILY
Qty: 90 TABLET | Refills: 0 | Status: SHIPPED | OUTPATIENT
Start: 2023-11-28 | End: 2023-12-28

## 2023-12-18 ENCOUNTER — TELEMEDICINE (OUTPATIENT)
Dept: BEHAVIORAL HEALTH | Facility: CLINIC | Age: 53
End: 2023-12-18
Payer: COMMERCIAL

## 2023-12-18 DIAGNOSIS — F90.0 ADHD (ATTENTION DEFICIT HYPERACTIVITY DISORDER), INATTENTIVE TYPE: Primary | ICD-10-CM

## 2023-12-18 DIAGNOSIS — F41.1 GENERALIZED ANXIETY DISORDER: ICD-10-CM

## 2023-12-18 PROCEDURE — 99214 OFFICE O/P EST MOD 30 MIN: CPT | Performed by: NURSE PRACTITIONER

## 2023-12-18 RX ORDER — FLUCONAZOLE 150 MG/1
TABLET ORAL
COMMUNITY
Start: 2023-11-21

## 2023-12-18 RX ORDER — CLINDAMYCIN HYDROCHLORIDE 300 MG/1
CAPSULE ORAL
COMMUNITY
Start: 2023-11-21

## 2023-12-18 NOTE — PATIENT INSTRUCTIONS
"1. Should you want to get in touch with your provider, NIKUNJ Neri, please contact MY Medical Assistant, Manjula, directly at [phone].  Recommend saving Manjula's direct number in phone as this is the PREFERRED & EASIEST way to get in contact with your provider.  Please leave a voice mail if you do not get an answer and she will return your call within 24 hrs. You will NOT be able to contact provider on [employer], as Behavioral Health Providers are restricted. YOU MUST CALL [phone]  If you need to speak with the on call provider after hours or on weekends, please Contact the [facility] ([phone]) and staff will be able to page the provider on call directly.     2.  In the event you need to cancel an appointment, please notify the office at least 24 hrs prior:   Contact **Manjula Medical Assistant at [facility] directly at [phone] or the [facility] ([phone])     3. MEDICATION REFILLS:  PLEASE CALL THE PHARMACY TO REQUEST ALL MEDICATION REFILLS or via [employer] TO ENSURE YOU ARE RECEIVING YOUR MEDICATIONS IN A TIMELY MANNER. The pharmacy or [employer] will send this request ELECTRONICALLY to the ordering provider.   IF YOU USE AN AUTOMATED SERVICE AT THE PHARMACY FOR REFILLS AND ARE TOLD THERE ARE \"NO REFILLS REMAINING\"   PLEASE CALL THE PHARMACY & SPEAK TO A LIVE PERSON TO VERIFY IT IS THE MOST UP TO DATE PRESCRIPTION ON FILE.    All new prescriptions will have a different number, therefore, if you were given refills for a medication today or at last visit it will not have the same number as the previous prescription.     4.  In the event you have personal crisis, contact the following crisis numbers: Suicide Prevention Hotline 1-331.124.3105 or *988, [facility] Helpline 4-160-728-ALWP; [facility] Emergency Room [phone]; text HELLO to 918651; or 911.  If you feel like harming yourself or others, call 911 right away.  You can call the 981 Suicide and Crisis " "Lifeline at  988   to speak with a counselor at the [facility], or you can connect with one using their online chat  .    5.  Never stop an antidepressant medicine without first talking to a healthcare provider. Suddenly stopping this type of medication can cause withdrawal symptoms.    6.  Counseling and talk therapy  Counseling or therapy teaches you new coping skills and more adaptive ways of thinking about problems. These tools can help you make positive changes. The benefits of counseling often last long after treatment sessions have stopped.    7.   We would appreciate your feedback, please scan the QRS code on the back of your appointment card (or see below) and complete a brief survey.  [place] location is still not available, so please click \"[place]\" location.  Thank you      SPECIFIC RECOMMENDATIONS:     1.      Medications discussed at this encounter:                   -  request refill for Adderall on [employer]    2.      Psychotherapy recommendations: Declined     3.     Return to clinic: 2 months, Friday 2/16/24 at 840 am in office    Please arrive at least 15 minutes before your scheduled appointment time to complete check in process.      IF you are scheduled for a [employer] VIDEO visit, YOU MUST COMPLETE THE \"E-CHECK IN\" PROCESS PRIOR TO BEGINNING THE VISIT, YOU WILL NO LONGER RECEIVE A PHONE CALL PRIOR TO ALL VIDEO VISITS; You may still complete the E-Check in for in office visits prior to appointment, you will receive multiple text/email reminders which will direct you further if needed.           "

## 2023-12-18 NOTE — PROGRESS NOTES
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  Positive for visual disturbance.        Blurred vision reported today   Respiratory:  Positive for cough. Negative for shortness of breath.    Cardiovascular:  Negative for chest pain, palpitations and leg swelling.   Gastrointestinal:  Negative for diarrhea, nausea and vomiting.   Endocrine: Negative for cold intolerance and heat intolerance.   Genitourinary:  Negative for difficulty urinating.   Musculoskeletal: Negative.  Negative for joint swelling.   Allergic/Immunologic: Negative for immunocompromised state.   Neurological: Negative.  Negative for dizziness, seizures, speech difficulty and numbness.   Psychiatric/Behavioral:  Negative for decreased concentration, hallucinations, self-injury, sleep disturbance and suicidal ideas. The patient is not nervous/anxious and is not hyperactive.          Physical Exam:  Physical Exam  Psychiatric:         Attention and Perception: Attention and perception normal.         Mood and Affect: Mood and affect normal.         Speech: Speech normal.         Behavior: Behavior normal. Behavior is cooperative.         Thought Content: Thought content normal. Thought content does not include suicidal ideation. Thought content does not include suicidal plan.         Cognition and Memory: Cognition and memory normal.         Judgment: Judgment normal.         Vital Signs:   There were no vitals taken for this visit.     Lab Results:   No visits with  results within 6 Month(s) from this visit.   Latest known visit with results is:   Clinical Support on 02/16/2023   Component Date Value Ref Range Status    Amphetamine Screen, Urine 02/16/2023 Positive (A)  Negative Final    Barbiturates Screen, Urine 02/16/2023 Negative  Negative Final    Buprenorphine, Screen, Urine 02/16/2023 Negative  Negative Final    Benzodiazepine Screen, Urine 02/16/2023 Negative  Negative Final    Cocaine Screen, Urine 02/16/2023 Negative  Negative Final    MDMA (ECSTASY) 02/16/2023 Negative  Negative Final    Methamphetamine, Ur 02/16/2023 Negative  Negative Final    Methadone Screen, Urine 02/16/2023 Negative  Negative Final    Opiate Screen 02/16/2023 Negative  Negative Final    Oxycodone Screen, Urine 02/16/2023 Negative  Negative Final    Phencyclidine (PCP), Urine 02/16/2023 Negative  Negative Final    THC, Screen, Urine 02/16/2023 Negative  Negative Final    Lot Number 02/16/2023 [id number]   Final    Expiration Date 02/16/2023 3/31/24   Final       EKG Results:  No orders to display       Imaging Results:  No Images in the past 120 days found..      Assessment & Plan   Diagnoses and all orders for this visit:    1. ADHD (attention deficit hyperactivity disorder), inattentive type (Primary)    2. Generalized anxiety disorder            Visit Diagnoses:    ICD-10-CM ICD-9-CM   1. ADHD (attention deficit hyperactivity disorder), inattentive type  F90.0 314.00   2. Generalized anxiety disorder  F41.1 300.02           PLAN:  Safety: No acute safety concerns  Therapy: Declines  Risk Assessment: Risk of self-harm acutely is low.  Risk factors include anxiety disorder, access to guns/weapons, and recent psychosocial stressors (pandemic). Protective factors include no family history, no present SI, no history of suicide attempts or self-harm in the past, minimal AODA, healthcare seeking, future orientation, willingness to engage in care.  Risk of self-harm chronically is also low, but could  be further elevated in the event of treatment noncompliance and/or AODA.  Meds: Continue Wellbutrin  mg by mouth twice daily to target  ADHD and mood.     Continue Adderall IR 20 mg by mouth 3 TIMES DAILY to target attention and concentration deficit, symptoms of ADHD.  Last dispensed 11/28/23 #90/30 day supply.  Risks, benefits, side effects discussed with patient including elevated heart rate, elevated blood pressure, irritability, insomnia, sexual dysfunction, appetite suppressing properties, psychosis.  After discussion of these risks and benefits, the patient voiced understanding and agreed to proceed. Patient instructed to request refill for Adderall via [employer] when needed.    Controlled substance documentation: Ryan reviewed; prior urine drug screen consistent obtained 2/16/23; consent is up to date, signed, witnessed and in EHR, dated 2/16/23, which will be updated annually per policy. Patient is aware of risk of addiction on this medication, understands need to follow up for a review every 3 months and medications will be adjusted or decreased as deemed appropriate at each visit.  No history of drug or alcohol abuse.  No concerns about diversion or abuse. Patient denies side effects related to the medication.  Patient is aware of random urine drug screens and pill counts. The dosing of this medication will be reviewed on a regular basis and reduced if possible. Ongoing use of a controlled substance is necessary for this patient to have a normal quality of life.    Continue Trazodone 50 mg by mouth nightly as needed to target insomnia.  Risks, benefits, side effects discussed with patient including GI upset, sedation, dizziness/falls risk, grogginess the following day, prolongation of the QTc interval.  After discussion of these risks and benefits, the patient voiced understanding and agreed to proceed.  Managed per PCP.    Labs: n/a    Symptoms of anxiety, depression, ADHD are under good control  with current medication regimen.  Next appointment will be in office for annual CSA requirements.   Patient was given instructions and counseling regarding condition and for health maintenance advice. Please see specific information pulled into the AVS if appropriate.    Patient to contact provider if symptoms worsen or fail to improve.        9/18/23  Continue Wellbutrin  mg by mouth  twice daily to target  ADHD and mood.     Continue Adderall IR 20 mg by mouth 3 TIMES DAILY to target attention and concentration deficit, symptoms of ADHD.  Last dispensed 8/21/23 #90/30 day supply.  Risks, benefits, side effects discussed with patient including elevated heart rate, elevated blood pressure, irritability, insomnia, sexual dysfunction, appetite suppressing properties, psychosis.  After discussion of these risks and benefits, the patient voiced understanding and agreed to proceed. Will send in a 90 day supply to [facility] for insurance, cost, and availability.  Informed patient order would be sent to Dr. Enrique in separate entry for signature due to EMR system, and will not see as refilled on AVS today.   Symptoms of ADHD are under good control with current dose of Adderall IR, Patient to contact provider if symptoms worsen or fail to improve.        7/17/23:   -Continue Wellbutrin  mg by mouth  twice daily to target  ADHD and mood. Refilled today  -Continue Adderall IR 20 mg by mouth 3 TIMES DAILY to target attention and concentration deficit, symptoms of ADHD.  Last dispensed 6/23/23 #90/30 day supply.  Will continue to send to [facility] pharmacy due to availability of Adderall as [facility] has been running out of medication.  Patient instructed to Request refills when needed; earliest fill date for Adderall IR 20 mg 6/22/23.     Controlled substance documentation: Ryan reviewed; prior urine drug screen consistent obtained 2/16/23; consent is up to date, signed, witnessed and in EHR, dated  2/16/23, which will be updated annually per policy. Patient is aware of risk of addiction on this medication, understands need to follow up for a review every 3 months and medications will be adjusted or decreased as deemed appropriate at each visit.  No history of drug or alcohol abuse.  No concerns about diversion or abuse. Patient denies side effects related to the medication.  Patient is aware of random urine drug screens and pill counts. The dosing of this medication will be reviewed on a regular basis and reduced if possible. Ongoing use of a controlled substance is necessary for this patient to have a normal quality of life.  Symptoms of ADHD are under good control with current medication regimen.   Patient was given instructions and counseling regarding condition and for health maintenance advice. Please see specific information pulled into the AVS if appropriate.    Patient to contact provider if symptoms worsen or fail to improve.      4/17/23:  -Continue Wellbutrin  mg by mouth  twice daily to target anxiety, attention & concentration deficit.   -Continue Adderall IR 20 mg by mouth 3 TIMES DAILY to target attention and concentration deficit, symptoms of ADHD.  Last dispensed 3/27/23 #90/30 day supply.  Risks, benefits, side effects discussed with patient including elevated heart rate, elevated blood pressure, irritability, insomnia, sexual dysfunction, appetite suppressing properties, psychosis.  After discussion of these risks and benefits, the patient voiced understanding and agreed to proceed.  Patient instructed to contact pharmacy when refill needed as patient is not due for refill today.  Will continue to send to [facility] pharmacy due to availability of Adderall as [facility] has been running out of medication.  Patient instructed to Request refills when needed; earliest fill date for Adderall IR 20 mg 3/25/23, request no later than 4/24/23.   Symptoms of ADHD are under good control with current dose  of Adderall IR. Tolerating well, without reported side effects. Patient to contact provider if symptoms worsen or fail to improve.       2/16/23:    Continue Wellbutrin  mg by mouth twice daily to target anxiety, attention & concentration deficit.     Continue Adderall IR 20 mg by mouth 3 TIMES DAILY to target attention and concentration deficit, symptoms of ADHD.  Last dispensed 1/24/23 #90/30 day supply.  Risks, benefits, side effects discussed with patient including elevated heart rate, elevated blood pressure, irritability, insomnia, sexual dysfunction, appetite suppressing properties, psychosis.  After discussion of these risks and benefits, the patient voiced understanding and agreed to proceed.  Patient instructed to contact pharmacy when refill needed as patient is not due for refill today.  Will continue to send to [facility] pharmacy due to availability of Adderall as [facility] been running [facility] of medication.   Controlled substance documentation: Ryan reviewed; prior urine drug screen consistent obtained 2/15/22, UDS today as expected 2/16/23; consent is up to date, signed, witnessed and in EHR, dated 2/16/23, which will be updated annually per policy. Patient is aware of risk of addiction on this medication, understands need to follow up for a review every 3 months and medications will be adjusted or decreased as deemed appropriate at each visit.  No history of drug or alcohol abuse.  No concerns about diversion or abuse. Patient denies side effects related to the medication.  Patient is aware of random urine drug screens and pill counts. The dosing of this medication will be reviewed on a regular basis and reduced if possible..  Ongoing use of a controlled substance is necessary for this patient to have a normal quality of life.  POC UDS today in clinic, results positive for amphetamines as expected.     Patient encouraged to check prescription bottle description of tablet, as some pharmacies have been  receiving Adderall IR from various manufactures, though the 2 should not be mixed in one bottle per prior discussions with pharmacy stafff.     Symptoms of ADHD are under good control with current dose of Adderall IR. Tolerating well, without reported side effects. Patient to contact provider if symptoms worsen or fail to improve.         1/11/23:   Continue Wellbutrin  mg by mouth  twice daily to target anxiety, attention & concentration deficit.     INCREASE Adderall IR FROM 20 mg by mouth 2 times daily (7am&6pm) AND one half tablet to equal 10 mg at midday at 12-1 pm TO 20 mg by mouth 3 TIMES DAILY to target attention and concentration deficit.  Last dispensed 12/28/22 #75/30 day supply.    Risks, benefits, side effects discussed with patient including elevated heart rate, elevated blood pressure, irritability, insomnia, sexual dysfunction, appetite suppressing properties, psychosis.  After discussion of these risks and benefits, the patient voiced understanding and agreed to proceed.  Patient instructed to contact MA directly in office when refill needed as patient is not due for refill today and has been instructed to start taking 3 of the 20 mg daily and will deplete current supply before 1/28/23.  Updated order in EHR, ordered as a NO PRINT.  Will continue to send to [facility] pharmacy due to availability of Adderall as [facility].     Patient to be seen in office for next visit to sign annual CSA and provide UDS.    Symptoms of ADHD are under fair control with current Adderall regimen, will increase afternoon/midday dose today, patient instructed to contact provider if unable to tolerate, denies side effects.  Morning and Evening doses of Adderall IR 20 mg have been effective thus far in management of ADHD symptoms.  Coordinated care with patient for an in office visit next month, patient instructed to arrive at [facility] at 1245 2/16/23 and provide urine sample and for  "in office visit, so patient can leave in timely manner to arrive to employer in [place]. Informed office staff of scheduling and no need to contact patient to set up appointment. Patient to contact provider if symptoms worsen or fail to improve.       12/27/22:  TELEPHONE  Patient called asking if she can be changed to something other than Adderall (\"since it is currently unavailable, or hard to find?\").  Please advise  Please advise her to contact various pharmacies to determine availability, such as [facility], [facility], [facility] are a few I can recall that have had medication in supply. She will have to be seen before switching medications, and I wouldn't advise switching as she has done well with Adderall and it is available, though some pharmacies are not getting at routine intervals.  But she will need to call around and see which pharmacies are able to fill medication.  It looks like [facility] has filled Adderall every month.   Called and advised patient of Mallorie's response.  Patient says she will call around and see where she can find it and call me back and let me know.  Patient called around and would like for you to [facility] and [facility] to [facility] [address].(she says they have it in stock.  Please resend to the [facility].  Dr. Enrique is out this week, so I will send request to [facility], last dispensed 11/28/22, patient did not  recent order from 12/16/22.     12/6/22:  Declines therapy  Continue Wellbutrin  mg by mouth  twice daily to target anxiety, attention & concentration deficit.     Continue Adderall IR 20 mg by mouth 2 times daily (7am&6pm) AND one half tablet to equal 10 mg at midday at 12-1 pm to target attention and concentration deficit.  Last dispensed 11/28/22 #60/30 day supply.  Patient had requested short supply on 11/28/22, however, order was sent as previous prescription for twice daily dosing.  10/21/22 #75/30 days with changes. Risks, benefits, side effects " discussed with patient including elevated heart rate, elevated blood pressure, irritability, insomnia, sexual dysfunction, appetite suppressing properties, psychosis.  After discussion of these risks and benefits, the patient voiced understanding and agreed to proceed.  Patient instructed to notify provider at office no later than Thurs 12/15/22 so correct prescription is ordered.  Tolerating new dosing well with reported effectiveness.     Controlled substance documentation: Ryan reviewed; prior urine drug screen consistent obtained 2/15/22; consent is up to date, signed, witnessed and in EHR, dated 2/15/22, which will be updated annually per policy. Patient is aware of risk of addiction on this medication, understands need to follow up for a review every 3 months and medications will be adjusted or decreased as deemed appropriate at each visit.  No history of drug or alcohol abuse.  No concerns about diversion or abuse. Patient denies side effects related to the medication.  Patient is aware of random urine drug screens and pill counts. The dosing of this medication will be reviewed on a regular basis and reduced if possible..  Ongoing use of a controlled substance is necessary for this patient to have a normal quality of life.  Symptoms of anxiety and sleep disturbance appear to be chronic and unrelated to medications, as patient is working 60 hrs/week and going to school full time.  Symptoms of anxiety, ADHD are under good control with Wellbutrin and Adderall.  Patient to contact provider if symptoms worsen or fail to improve.     10/21/22:   Continue Wellbutrin  mg by mouth  twice daily to target anxiety, attention & concentration deficit.     Continue Adderall IR 20 mg by mouth twice daily to target attention and concentration deficit.  Last dispensed 9/16/22 #60/30 day supply. Instructed to continue 7 am dose and take 2nd dose later at 6 pm.   ADD Adderall IR 10 mg by mouth daily at 1 pm to target  attention and concentration deficit. Risks, benefits, side effects discussed with patient including elevated heart rate, elevated blood pressure, irritability, insomnia, sexual dysfunction, appetite suppressing properties, psychosis.  After discussion of these risks and benefits, the patient voiced understanding and agreed to proceed.   Informed patient order would be sent to Dr. Enrique in separate entry for signature due to EMR system, and will not see as refilled on AVS today.  Controlled substance documentation: Ryan reviewed; prior urine drug screen consistent obtained 2/15/22; consent is up to date, signed, witnessed and in EHR, dated 2/15/22, which will be updated annually per policy. Patient is aware of risk of addiction on this medication, understands need to follow up for a review every 3 months and medications will be adjusted or decreased as deemed appropriate at each visit.  No history of drug or alcohol abuse.  No concerns about diversion or abuse. Patient denies side effects related to the medication.  Patient is aware of random urine drug screens and pill counts. The dosing of this medication will be reviewed on a regular basis and reduced if possible..  Ongoing use of a controlled substance is necessary for this patient to have a normal quality of life.  Collaborated with  regarding adding a 3rd dose of Adderall IR as patient day starts at 7 am and ends at 12 am with work and school schedule.  Will add 10 mg to take midday at 12-1 pm.        9/16/22:   Declines therapy  Continue Wellbutrin  mg by mouth  twice daily to target anxiety, attention & concentration deficit.   Continue Adderall IR 20 mg by mouth twice daily to target attention and concentration deficit.  Last dispensed 8/17/22 for 30 day supply #60 per   Will send order to  today. Will inquire of 3 month supply with , however, explained to patient a 90 day supply would not be ordered until stabilized  on a maintanence dose, patient verbalized understanding.   Tolerating medications well, denies side effects, attention and concentration has improved with increase dose.    Controlled substance documentation: Ryan reviewed; prior urine drug screen consistent obtained 2/15/22; consent is up to date, signed, witnessed and in EHR, dated 2/15/22, which will be updated annually per policy. Patient is aware of risk of addiction on this medication, understands need to follow up for a review every 3 months and medications will be adjusted or decreased as deemed appropriate at each visit.  No history of drug or alcohol abuse.  No concerns about diversion or abuse. Patient denies side effects related to the medication.  Patient is aware of random urine drug screens and pill counts. The dosing of this medication will be reviewed on a regular basis and reduced if possible..  Ongoing use of a controlled substance is necessary for this patient to have a normal quality of life.    8/15/22:   Continue Wellbutrin  mg by mouth  twice daily to target anxiety, attention & concentration deficit. Refilled today     Increase Adderall IR from 15 mg to 20 mg by mouth twice daily to target attention and concentration deficit.  Last dispensed 7/19/22 for 30 day supply #60 per   Will send order to  today and allow first fill 8/17/22.     Will increase Adderall IR to 20 mg twice daily as patient has been experiencing inability to complete tasks as she is shifting from one uncompleted tasks to another in between morning and mid afternoon doses of 15 mg IR.       7/11/22:   Continue Wellbutrin  mg by mouth  twice daily to target anxiety, attention & concentration deficit.      Continue Adderall IR 15 mg by mouth twice daily to target attention and concentration deficit.  Last dispensed 6/18/22 for 30 day supply #60 per   Patient doing well with current medication regimen will continue current medications and  send update to  for refill of Adderall IR. Will see patient back in 5 weeks    6/13/22:   Patient tolerating Adderall 15 mg IR twice daily without difficulty, symptoms are managed well currently. Will send message to  informing of toleration and refill request, patient was informed medication would not be available for refill until closer to 6/19/22, will have patient return in 4-5 weeks closer to refill date.   Continue Wellbutrin  mg by mouth  twice daily to target anxiety, attention & concentration deficit.    Continue Adderall IR 15 mg by mouth twice daily to target attention and concentration deficit.  Last dispensed 5/19/22 for 30 day supply #60 per     5/11/22:   Continue Wellbutrin  mg by mouth  twice daily to target anxiety, attention & concentration deficit.      Will stop Adderall XR 15 mg due to hot flash sensation and ineffectiveness.   Will plan on changing Adderall IR 10 mg by mouth every afternoon to Adderall IR 15 mg by mouth twice daily to target attention and concentration deficit.  Last IR dispensed 4/18/22, will send message to Dr. Enrique regarding recommendations and symptoms, patient informed medication would not be available for  until 5/18/22, patient verbalized understanding.       4/13/22:  Continue Wellbutrin  mg by mouth  twice daily to target anxiety, attention & concentration deficit.      Start Adderall XR 15 mg by mouth daily every morning and continue Adderall IR 10 mg by mouth every afternoon to target attention and concentration deficit.  Denies side effects of medication. Message sent to Dr. Enrique regarding an update of patient symptoms and plan.     3/15/22:   Increase Wellbutrin  mg by mouth daily to twice daily as patient reports at times taking twice daily to target anxiety, attention & concentration deficit.      Increase Adderall IR 10 mg by mouth from once daily to twice daily to target attention and concentration  deficit, take one in the morning and the 2nd dose in afternoon approximately 6 hr apart.    Denies side effects of medication. Message sent to Dr. Enrique regarding an update of patient symptoms and plan.     2/15/22:   -POC UDS  -CSA signed and reviewed with patient   -Continue Wellbutrin  mg by mouth daily to target anxiety, attention & concentration deficit.    -Adderall IR 5 mg by mouth twice daily to target attention and concentration deficit, take one in the morning and the 2nd dose in afternoon approximately 6 hr apart.  Risks, benefits, side effects discussed with patient including elevated heart rate, elevated blood pressure, irritability, insomnia, sexual dysfunction, appetite suppressing properties, psychosis.  After discussion of these risks and benefits, the patient voiced understanding and agreed to proceed. UDS ordered, Ryan reviewed. Informed patient medication would not be ordered until UDS results received, and would be ordered per Dr. Enrique.   Adderall IR 10 mg by mouth daily was ordered per  on 2/15/22      11/30/22:   Change Wellbutrin  mg twice daily to Wellbutrin  mg by mouth daily in the am to target anxiety, attention & concentration deficit.    ADHD testing referral to Al Cole with [facility] in [place], [place] as patient prefers to see provider closer to home.  List given of providers (4) for testing, patient to contact to schedule appointment.    Patient screened positive for depression based on a PHQ-9 score of 0 on 7/17/2023. Follow-up recommendations include: Prescribed antidepressant medication treatment and Suicide Risk Assessment performed.       TREATMENT PLAN/GOALS: Continue supportive psychotherapy efforts and medications as indicated. Treatment and medication options discussed during today's visit. Patient acknowledged and verbally consented to continue with current treatment plan and was educated on the importance of compliance with  treatment and follow-up appointments.    MEDICATION ISSUES:  JOVITA reviewed as expected.  Discussed medication options and treatment plan of prescribed medication as well as the risks, benefits, and side effects including potential falls, possible impaired driving and metabolic adversities among others. Patient is agreeable to call the office with any worsening of symptoms or onset of side effects. Patient is agreeable to call 911 or go to the nearest ER should he/she begin having SI/HI. No medication side effects or related complaints today.     MEDS ORDERED DURING VISIT:  No orders of the defined types were placed in this encounter.      Return in about 2 months (around 2/18/2024) for medication check.          I spent 23 minutes caring for Vickie on this date of service. This time includes time spent by me in the following activities: preparing for the visit, performing a medically appropriate examination and/or evaluation, counseling and educating the patient/family/caregiver, referring and communicating with other health care professionals, documenting information in the medical record, care coordination, and scheduling         This document has been electronically signed by NIKUNJ Neri  December 18, 2023 08:47 EST      Part of this note may be an electronic transcription/translation of spoken language to printed text using the Dragon Dictation System.

## 2023-12-26 DIAGNOSIS — F90.0 ADHD (ATTENTION DEFICIT HYPERACTIVITY DISORDER), INATTENTIVE TYPE: ICD-10-CM

## 2023-12-26 RX ORDER — PREDNISONE 5 MG/1
TABLET ORAL
COMMUNITY
Start: 2023-12-18

## 2023-12-26 RX ORDER — DEXTROAMPHETAMINE SACCHARATE, AMPHETAMINE ASPARTATE, DEXTROAMPHETAMINE SULFATE AND AMPHETAMINE SULFATE 5; 5; 5; 5 MG/1; MG/1; MG/1; MG/1
20 TABLET ORAL 3 TIMES DAILY
Qty: 90 TABLET | Refills: 0 | Status: SHIPPED | OUTPATIENT
Start: 2023-12-27 | End: 2024-01-26

## 2023-12-26 RX ORDER — AMOXICILLIN 875 MG/1
TABLET, COATED ORAL
COMMUNITY
Start: 2023-12-18

## 2023-12-26 RX ORDER — DEXTROMETHORPHAN HYDROBROMIDE AND PROMETHAZINE HYDROCHLORIDE 15; 6.25 MG/5ML; MG/5ML
SYRUP ORAL
COMMUNITY
Start: 2023-12-18

## 2023-12-26 NOTE — TELEPHONE ENCOUNTER
Patient called to request a refill for her Adderall, patient says she wanted to get a few days ahead of the game.  Med pended Please review

## 2024-01-25 DIAGNOSIS — F90.0 ADHD (ATTENTION DEFICIT HYPERACTIVITY DISORDER), INATTENTIVE TYPE: ICD-10-CM

## 2024-01-25 RX ORDER — DEXTROAMPHETAMINE SACCHARATE, AMPHETAMINE ASPARTATE, DEXTROAMPHETAMINE SULFATE AND AMPHETAMINE SULFATE 5; 5; 5; 5 MG/1; MG/1; MG/1; MG/1
20 TABLET ORAL 3 TIMES DAILY
Qty: 90 TABLET | Refills: 0 | Status: SHIPPED | OUTPATIENT
Start: 2024-01-26 | End: 2024-02-25

## 2024-02-16 ENCOUNTER — OFFICE VISIT (OUTPATIENT)
Dept: BEHAVIORAL HEALTH | Facility: CLINIC | Age: 54
End: 2024-02-16
Payer: COMMERCIAL

## 2024-02-16 ENCOUNTER — CLINICAL SUPPORT (OUTPATIENT)
Dept: FAMILY MEDICINE CLINIC | Age: 54
End: 2024-02-16
Payer: COMMERCIAL

## 2024-02-16 VITALS
HEIGHT: 66 IN | DIASTOLIC BLOOD PRESSURE: 84 MMHG | SYSTOLIC BLOOD PRESSURE: 128 MMHG | BODY MASS INDEX: 28.99 KG/M2 | WEIGHT: 180.4 LBS | HEART RATE: 84 BPM

## 2024-02-16 DIAGNOSIS — F90.0 ADHD (ATTENTION DEFICIT HYPERACTIVITY DISORDER), INATTENTIVE TYPE: Primary | ICD-10-CM

## 2024-02-16 DIAGNOSIS — F41.1 GENERALIZED ANXIETY DISORDER: ICD-10-CM

## 2024-02-16 DIAGNOSIS — Z79.899 HIGH RISK MEDICATION USE: ICD-10-CM

## 2024-02-16 DIAGNOSIS — Z79.899 CONTROLLED SUBSTANCE AGREEMENT SIGNED: ICD-10-CM

## 2024-02-16 DIAGNOSIS — F51.04 CHRONIC INSOMNIA: ICD-10-CM

## 2024-02-16 LAB
AMPHET+METHAMPHET UR QL: POSITIVE
AMPHETAMINES UR QL: NEGATIVE
BARBITURATES UR QL SCN: NEGATIVE
BENZODIAZ UR QL SCN: NEGATIVE
BUPRENORPHINE SERPL-MCNC: NEGATIVE NG/ML
CANNABINOIDS SERPL QL: NEGATIVE
COCAINE UR QL: NEGATIVE
EXPIRATION DATE: ABNORMAL
Lab: ABNORMAL
MDMA UR QL SCN: NEGATIVE
METHADONE UR QL SCN: NEGATIVE
MORPHINE/OPIATES SCREEN, URINE: NEGATIVE
OXYCODONE UR QL SCN: NEGATIVE
PCP UR QL SCN: NEGATIVE

## 2024-02-16 RX ORDER — ESTRADIOL 0.03 MG/D
FILM, EXTENDED RELEASE TRANSDERMAL
COMMUNITY
Start: 2024-02-02

## 2024-02-26 DIAGNOSIS — F90.0 ADHD (ATTENTION DEFICIT HYPERACTIVITY DISORDER), INATTENTIVE TYPE: ICD-10-CM

## 2024-02-27 RX ORDER — DEXTROAMPHETAMINE SACCHARATE, AMPHETAMINE ASPARTATE, DEXTROAMPHETAMINE SULFATE AND AMPHETAMINE SULFATE 5; 5; 5; 5 MG/1; MG/1; MG/1; MG/1
20 TABLET ORAL 3 TIMES DAILY
Qty: 90 TABLET | Refills: 0 | Status: SHIPPED | OUTPATIENT
Start: 2024-02-27

## 2024-03-25 DIAGNOSIS — F90.0 ADHD (ATTENTION DEFICIT HYPERACTIVITY DISORDER), INATTENTIVE TYPE: ICD-10-CM

## 2024-03-25 RX ORDER — DEXTROAMPHETAMINE SACCHARATE, AMPHETAMINE ASPARTATE, DEXTROAMPHETAMINE SULFATE AND AMPHETAMINE SULFATE 5; 5; 5; 5 MG/1; MG/1; MG/1; MG/1
20 TABLET ORAL 3 TIMES DAILY
Qty: 90 TABLET | Refills: 0 | Status: SHIPPED | OUTPATIENT
Start: 2024-03-27 | End: 2024-04-26

## 2024-04-23 DIAGNOSIS — F90.0 ADHD (ATTENTION DEFICIT HYPERACTIVITY DISORDER), INATTENTIVE TYPE: ICD-10-CM

## 2024-04-23 RX ORDER — BUPROPION HYDROCHLORIDE 150 MG/1
TABLET, EXTENDED RELEASE ORAL
Qty: 180 TABLET | Refills: 3 | Status: SHIPPED | OUTPATIENT
Start: 2024-04-23

## 2024-04-25 DIAGNOSIS — F90.0 ADHD (ATTENTION DEFICIT HYPERACTIVITY DISORDER), INATTENTIVE TYPE: ICD-10-CM

## 2024-04-25 RX ORDER — DEXTROAMPHETAMINE SACCHARATE, AMPHETAMINE ASPARTATE, DEXTROAMPHETAMINE SULFATE AND AMPHETAMINE SULFATE 5; 5; 5; 5 MG/1; MG/1; MG/1; MG/1
20 TABLET ORAL 3 TIMES DAILY
Qty: 90 TABLET | Refills: 0 | Status: SHIPPED | OUTPATIENT
Start: 2024-04-26 | End: 2024-05-26

## 2024-05-15 ENCOUNTER — TELEMEDICINE (OUTPATIENT)
Dept: PSYCHIATRY | Facility: CLINIC | Age: 54
End: 2024-05-15
Payer: COMMERCIAL

## 2024-05-15 DIAGNOSIS — F90.0 ADHD (ATTENTION DEFICIT HYPERACTIVITY DISORDER), INATTENTIVE TYPE: ICD-10-CM

## 2024-05-15 DIAGNOSIS — F41.1 GENERALIZED ANXIETY DISORDER: ICD-10-CM

## 2024-05-15 DIAGNOSIS — Z79.899 MEDICATION MANAGEMENT: ICD-10-CM

## 2024-05-15 DIAGNOSIS — F90.0 ADHD (ATTENTION DEFICIT HYPERACTIVITY DISORDER), INATTENTIVE TYPE: Primary | ICD-10-CM

## 2024-05-15 NOTE — PATIENT INSTRUCTIONS
"1. Should you want to get in touch with your provider, NIKUNJ Neri, please contact MY Medical Assistant, Manjula, directly at [phone].  Recommend saving Manjula's direct number in phone as this is the PREFERRED & EASIEST way to get in contact with your provider.  Please leave a voice mail if you do not get an answer and she will return your call within 24 hrs. You will NOT be able to contact provider on [employer], as Behavioral Health Providers are restricted. YOU MUST CALL [phone]  If you need to speak with the on call provider after hours or on weekends, please Contact the [facility] ([phone]) and staff will be able to page the provider on call directly.     2.  In the event you need to cancel an appointment, please notify the office at least 24 hrs prior:   Contact **Manjula Medical Assistant at [facility] directly at [phone] or the [facility] ([phone])     3. MEDICATION REFILLS:  PLEASE CALL THE PHARMACY TO REQUEST ALL MEDICATION REFILLS or via [employer] TO ENSURE YOU ARE RECEIVING YOUR MEDICATIONS IN A TIMELY MANNER. The pharmacy or [employer] [facility] will send this request ELECTRONICALLY to the ordering provider.   IF YOU USE AN AUTOMATED SERVICE AT THE PHARMACY FOR REFILLS AND ARE TOLD THERE ARE \"NO REFILLS REMAINING\"   PLEASE CALL THE PHARMACY & SPEAK TO A LIVE PERSON TO VERIFY IT IS THE MOST UP TO DATE PRESCRIPTION ON FILE.    All new prescriptions will have a different number, therefore, if you were given refills for a medication today or at last visit it will not have the same number as the previous prescription.     4.  In the event you have personal crisis, contact the following crisis numbers: Suicide Prevention Hotline 1-856.773.8160 or *988, [facility] Helpline 8-404-488-NILD; [facility] Emergency Room [phone]; text HELLO to 631212; or 911.  If you feel like harming yourself or others, call 911 right away.  You can call the 981 Suicide and Crisis " "Lifeline at  988   to speak with a counselor at the [facility], or you can connect with one using their online chat  .    5.  Never stop an antidepressant medicine without first talking to a healthcare provider. Suddenly stopping this type of medication can cause withdrawal symptoms.    6.  Counseling and talk therapy  Counseling or therapy teaches you new coping skills and more adaptive ways of thinking about problems. These tools can help you make positive changes. The benefits of counseling often last long after treatment sessions have stopped.    7.   We would appreciate your feedback, please scan the QRS code on the back of your appointment card (or see below) and complete a brief survey.  [place] location is still not available, so please click \"[place]\" location.  Thank you      SPECIFIC RECOMMENDATIONS:     1.      Medications discussed at this encounter:                   -  request refill when needed, contact mail order pharmacy to determine turn around time for delivery, cost of a 90 day supply of Adderall IR    2.      Psychotherapy recommendations: . Declined     3.     Return to clinic: 3 months, Wed. 8/14/24 at 0840 via video    Please arrive at least 15 minutes before your scheduled appointment time to complete check in process.      IF you are scheduled for a [employer] V[employer] visit, YOU MUST COMPLETE THE \"E-CHECK IN\" PROCESS PRIOR TO BEGINNING THE VISIT, YOU WILL NO LONGER RECEIVE A PHONE CALL PRIOR TO ALL VIDEO VISITS; You may still complete the E-Check in for in office visits prior to appointment, you will receive multiple text/email reminders which will direct you further if needed.           "

## 2024-05-15 NOTE — PROGRESS NOTES
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       lisinopril (PRINIVIL,ZESTRIL) 20 MG tablet Take 1 tablet by mouth Daily. 90 tablet 3    melatonin 5 MG tablet tablet Take 2 tablets by mouth Every Night.      metFORMIN ER (GLUCOPHAGE-XR) 500 MG 24 hr tablet       multivitamin with minerals tablet tablet Take 1 tablet by mouth Daily.      Progesterone (PROMETRIUM) 100 MG capsule       traZODone (DESYREL) 50 MG tablet Take 1 tablet by mouth At Night As Needed.      Zinc 100 MG tablet Take  by mouth Daily.       No current facility-administered medications for this visit.       Past Medical History:  Past Medical History:   Diagnosis Date    Anxiety        Past Psychiatric History:  Began Treatment: 20  Diagnoses:Anxiety and concentration deficit  Psychiatrist:Freda  Therapist:Denies  Admission History:Denies  Medication Trials: Zoloft [id number] during father illness, very high stress- worked well on same low dose for 10 yrs,last [id number]; another med like Ativan prn uncertain of name though made pt cry often, then switched to Ativan prn  Adderall XR 3 weeks increased hot flashes, not effective  Self Harm: Denies  Suicide Attempts:Denies   Psychosis, Anxiety, Depression: Denies    Substance Abuse History:   Types:Denies all, including illicit  Withdrawal Symptoms:Denies  Longest Period Sober:Not Applicable   AA: Not applicable     Social History:  Martial  "Status:Single  Employed:Yes and If so, 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\"  Kids:Yes or If so, how many 1 son  House:Lives in a house alone in trail in [place], [place], significant other lives in cabin on property   History: Denies  Access to Guns:  Yes, unlocked    Social History     Socioeconomic History    Marital status: Significant Other    Number of children: 1   Tobacco Use    Smoking status: Every Day     Current packs/day: 0.50     Average packs/day: 0.5 packs/day for 38.4 years (19.2 ttl pk-yrs)     Types: Cigarettes     Start date: 1986    Smokeless tobacco: Never   Vaping Use    Vaping status: Some Days    Substances: Nicotine, Flavoring    Devices: Disposable, Pre-filled or refillable cartridge, Pre-filled pod   Substance and Sexual Activity    Alcohol use: Yes     Comment: occassionally    Drug use: Never    Sexual activity: Defer       Family History:   Suicide Attempts: Denies  Suicide Completions:Denies      Family History   Problem Relation Age of Onset    ADD / ADHD Son        Developmental History:   Born: [place]  Siblings:2 brother, 1 sister  Childhood:  Step mother- physical, mental, verbal for approx 5 yrs -began in Middle school, stopped in  when moved out of house at age 15  High School:Completed  College: Currently enrolled online school for psychology 4 yr degree started May 2021- promotion required to have degree for employer    Mental Status Exam:   Hygiene:   good  Cooperation:  Cooperative  Eye Contact:  Good  Psychomotor Behavior:  Appropriate  Affect:  Appropriate  Mood: euthymic   Speech:  Normal  Thought Process:  Goal directed  Thought Content:  Normal  Suicidal:  None  Homicidal:  None  Hallucinations:  None  Delusion:  None  Memory:  Intact  Orientation:  Person, Place, Time and Situation  Reliability:  good  Insight:  Good  Judgement:  Good  Impulse Control:  Good  Physical/Medical Issues:  Yes HTN, IBS      Review of Systems:  Review of Systems "   Constitutional: Negative.  Negative for diaphoresis and fatigue.   HENT:  Negative for sinus pressure, sore throat and trouble swallowing.    Respiratory:  Negative for cough and shortness of breath.    Cardiovascular:  Negative for chest pain, palpitations and leg swelling.   Gastrointestinal:  Negative for diarrhea, nausea and vomiting.   Endocrine: Negative for cold intolerance and heat intolerance.   Genitourinary:  Negative for difficulty urinating.   Musculoskeletal: Negative.  Negative for joint swelling.   Allergic/Immunologic: Negative for immunocompromised state.   Neurological: Negative.  Negative for dizziness, seizures, speech difficulty and numbness.   Psychiatric/Behavioral:  Negative for decreased concentration, hallucinations, self-injury, sleep disturbance and suicidal ideas. The patient is not nervous/anxious and is not hyperactive.          Physical Exam:  Physical Exam  Psychiatric:         Attention and Perception: Attention and perception normal.         Mood and Affect: Mood and affect normal.         Speech: Speech normal.         Behavior: Behavior normal. Behavior is cooperative.         Thought Content: Thought content normal. Thought content does not include suicidal ideation. Thought content does not include suicidal plan.         Cognition and Memory: Cognition and memory normal.         Judgment: Judgment normal.         Vital Signs:   There were no vitals taken for this visit.     Lab Results:   Office Visit on 02/16/2024   Component Date Value Ref Range Status    Amphetamine Screen, Urine 02/16/2024 Positive (A)  Negative Final    Barbiturates Screen, Urine 02/16/2024 Negative  Negative Final    Buprenorphine, Screen, Urine 02/16/2024 Negative  Negative Final    Benzodiazepine Screen, Urine 02/16/2024 Negative  Negative Final    Cocaine Screen, Urine 02/16/2024 Negative  Negative Final    MDMA (ECSTASY) 02/16/2024 Negative  Negative Final    Methamphetamine, Ur 02/16/2024 Negative   Negative Final    Morphine/Opiates Screen, Urine 02/16/2024 Negative  Negative Final    Methadone Screen, Urine 02/16/2024 Negative  Negative Final    Oxycodone Screen, Urine 02/16/2024 Negative  Negative Final    Phencyclidine (PCP), Urine 02/16/2024 Negative  Negative Final    THC, Screen, Urine 02/16/2024 Negative  Negative Final    Lot Number 02/16/2024 [id number]   Final    Expiration Date 02/16/2024 11-9-2025   Final       EKG Results:  No orders to display       Imaging Results:  No Images in the past 120 days found..      Assessment & Plan   There are no diagnoses linked to this encounter.            Visit Diagnoses:  No diagnosis found.            PLAN:  Safety: No acute safety concerns  Therapy: Declines  Risk Assessment: Risk of self-harm acutely is low.  Risk factors include anxiety disorder, access to guns/weapons, and recent psychosocial stressors (pandemic). Protective factors include no family history, no present SI, no history of suicide attempts or self-harm in the past, minimal AODA, healthcare seeking, future orientation, willingness to engage in care.  Risk of self-harm chronically is also low, but could be further elevated in the event of treatment noncompliance and/or AODA.  Meds:   -Continue Wellbutrin  mg by mouth twice daily to target ADHD and mood.   -Continue Adderall IR 20 mg by mouth 3 TIMES DAILY to target attention and concentration deficit, symptoms of ADHD.  Last dispensed 4/26/24 #90/30 day supply.  Risks, benefits, side effects discussed with patient including elevated heart rate, elevated blood pressure, irritability, insomnia, sexual dysfunction, appetite suppressing properties, psychosis.  After discussion of these risks and benefits, the patient voiced understanding and agreed to proceed. Patient instructed to request refill for Adderall via [employer] when needed.  Patient to contact [facility] mail order pharmacy to determine turn around time for delivery, cost of a 90  day supply of Adderall IR, and if feasible and able to set up patient to indicate on refill request which may need to be submitted 7-10 days prior to due date initially, though patient will inform provider further after she calls to determine details. Will plan for a 90 day supply with next refill request.     Controlled substance documentation: Ryan reviewed; prior urine drug screen consistent obtained 2/16/24; consent is up to date, signed, witnessed and in EHR, dated 2/16/24, which will be updated annually per policy. Patient is aware of risk of addiction on this medication, understands need to follow up for a review every 3 months and medications will be adjusted or decreased as deemed appropriate at each visit.  No history of drug or alcohol abuse.  No concerns about diversion or abuse. Patient denies side effects related to the medication.  Patient is aware of random urine drug screens and pill counts. The dosing of this medication will be reviewed on a regular basis and reduced if possible. Ongoing use of a controlled substance is necessary for this patient to have a normal quality of life.    Continue Trazodone 50 mg by mouth nightly as needed to target insomnia.  Risks, benefits, side effects discussed with patient including GI upset, sedation, dizziness/falls risk, grogginess the following day, prolongation of the QTc interval.  After discussion of these risks and benefits, the patient voiced understanding and agreed to proceed.  Managed per PCP.    Labs:  n/a    Symptoms of anxiety and ADHD are under good control with current medication regimen.    The plan was discussed with the patient. The patient was given time to ask questions and these questions were answered. At the conclusion of their visit they had no additional questions or concerns and all questions were answered to their satisfaction.   Patient was given instructions and counseling regarding condition and for health maintenance advice.  Please see specific information pulled into the AVS if appropriate.    Patient to contact provider if symptoms worsen or fail to improve.      2/16/24:  -Continue Wellbutrin  mg by mouth twice daily to target  ADHD and mood. NR, Patient instructed to request refills when appropriate, when down to 5-7 days remaining via  pharmacy or via MyChart.       -Continue Adderall IR 20 mg by mouth 3 TIMES DAILY to target attention and concentration deficit, symptoms of ADHD.  Last dispensed 1/27/24 #90/30 day supply. Patient instructed to request refill for Adderall via MyChart when needed.    Controlled substance documentation: Ryan reviewed; prior urine drug screen consistent obtained today 2/16/24; consent is up to date, signed, witnessed and in EHR, dated today 2/16/24, which will be updated annually per policy. Patient is aware of risk of addiction on this medication, understands need to follow up for a review every 3 months and medications will be adjusted or decreased as deemed appropriate at each visit.  No history of drug or alcohol abuse.  No concerns about diversion or abuse. Patient denies side effects related to the medication.  Patient is aware of random urine drug screens and pill counts. The dosing of this medication will be reviewed on a regular basis and reduced if possible. Ongoing use of a controlled substance is necessary for this patient to have a normal quality of life.  -Continue Trazodone 50 mg by mouth nightly as needed to target insomnia.  Managed per PCP.  -Labs: POC UDS today in clinic, results noted as positive amphetamines as expected, patient informed.      Symptoms of anxiety, insomnia, ADHD are under good control with current medication regimen.    Patient was given instructions and counseling regarding condition and for health maintenance advice. Please see specific information pulled into the AVS if appropriate.    Patient to contact provider if symptoms worsen or fail to improve.         12/18/23:   -Continue Wellbutrin  mg by mouth twice daily to target  ADHD and mood.   -Continue Adderall IR 20 mg by mouth 3 TIMES DAILY to target attention and concentration deficit, symptoms of ADHD.  Last dispensed 11/28/23 #90/30 day supply.  . Patient instructed to request refill for Adderall via MyChart when needed.  -Continue Trazodone 50 mg by mouth nightly as needed to target insomnia. Managed per PCP.    Symptoms of anxiety, depression, ADHD are under good control with current medication regimen.  Next appointment will be in office for annual CSA requirements.   Patient was given instructions and counseling regarding condition and for health maintenance advice. Please see specific information pulled into the AVS if appropriate.    Patient to contact provider if symptoms worsen or fail to improve.        9/18/23  Continue Wellbutrin  mg by mouth  twice daily to target  ADHD and mood.     Continue Adderall IR 20 mg by mouth 3 TIMES DAILY to target attention and concentration deficit, symptoms of ADHD.  Last dispensed 8/21/23 #90/30 day supply.  Risks, benefits, side effects discussed with patient including elevated heart rate, elevated blood pressure, irritability, insomnia, sexual dysfunction, appetite suppressing properties, psychosis.  After discussion of these risks and benefits, the patient voiced understanding and agreed to proceed. Will send in a 90 day supply to [facility] Mail order pharmacy for insurance, cost, and availability.  Informed patient order would be sent to Dr. Enrique in separate entry for signature due to EMR system, and will not see as refilled on AVS today.   Symptoms of ADHD are under good control with current dose of Adderall IR, Patient to contact provider if symptoms worsen or fail to improve.        7/17/23:   -Continue Wellbutrin  mg by mouth  twice daily to target  ADHD and mood. Refilled today  -Continue Adderall IR 20 mg by mouth 3 TIMES DAILY to  target attention and concentration deficit, symptoms of ADHD.  Last dispensed 6/23/23 #90/30 day supply.  Will continue to send to [facility] pharmacy due to availability of Adderall as [facility] has been running out of medication.  Patient instructed to Request refills when needed; earliest fill date for Adderall IR 20 mg 6/22/23.     Controlled substance documentation: Ryan reviewed; prior urine drug screen consistent obtained 2/16/23; consent is up to date, signed, witnessed and in EHR, dated 2/16/23, which will be updated annually per policy. Patient is aware of risk of addiction on this medication, understands need to follow up for a review every 3 months and medications will be adjusted or decreased as deemed appropriate at each visit.  No history of drug or alcohol abuse.  No concerns about diversion or abuse. Patient denies side effects related to the medication.  Patient is aware of random urine drug screens and pill counts. The dosing of this medication will be reviewed on a regular basis and reduced if possible. Ongoing use of a controlled substance is necessary for this patient to have a normal quality of life.  Symptoms of ADHD are under good control with current medication regimen.   Patient was given instructions and counseling regarding condition and for health maintenance advice. Please see specific information pulled into the AVS if appropriate.    Patient to contact provider if symptoms worsen or fail to improve.      4/17/23:  -Continue Wellbutrin  mg by mouth  twice daily to target anxiety, attention & concentration deficit.   -Continue Adderall IR 20 mg by mouth 3 TIMES DAILY to target attention and concentration deficit, symptoms of ADHD.  Last dispensed 3/27/23 #90/30 day supply.  Risks, benefits, side effects discussed with patient including elevated heart rate, elevated blood pressure, irritability, insomnia, sexual dysfunction, appetite suppressing properties, psychosis.  After discussion of  these risks and benefits, the patient voiced understanding and agreed to proceed.  Patient instructed to contact pharmacy when refill needed as patient is not due for refill today.  Will continue to send to [facility] pharmacy due to availability of Adderall as [facility] has been running out of medication.  Patient instructed to Request refills when needed; earliest fill date for Adderall IR 20 mg 3/25/23, request no later than 4/24/23.   Symptoms of ADHD are under good control with current dose of Adderall IR. Tolerating well, without reported side effects. Patient to contact provider if symptoms worsen or fail to improve.       2/16/23:    Continue Wellbutrin  mg by mouth twice daily to target anxiety, attention & concentration deficit.     Continue Adderall IR 20 mg by mouth 3 TIMES DAILY to target attention and concentration deficit, symptoms of ADHD.  Last dispensed 1/24/23 #90/30 day supply.  Risks, benefits, side effects discussed with patient including elevated heart rate, elevated blood pressure, irritability, insomnia, sexual dysfunction, appetite suppressing properties, psychosis.  After discussion of these risks and benefits, the patient voiced understanding and agreed to proceed.  Patient instructed to contact pharmacy when refill needed as patient is not due for refill today.  Will continue to send to [facility] pharmacy due to availability of Adderall as [facility] has been running out of medication.   Controlled substance documentation: Ryan reviewed; prior urine drug screen consistent obtained 2/15/22, UDS today as expected 2/16/23; consent is up to date, signed, witnessed and in EHR, dated 2/16/23, which will be updated annually per policy. Patient is aware of risk of addiction on this medication, understands need to follow up for a review every 3 months and medications will be adjusted or decreased as deemed appropriate at each visit.  No history of drug or alcohol abuse.  No concerns about diversion or abuse.  Patient denies side effects related to the medication.  Patient is aware of random urine drug screens and pill counts. The dosing of this medication will be reviewed on a regular basis and reduced if possible..  Ongoing use of a controlled substance is necessary for this patient to have a normal quality of life.  POC UDS today in clinic, results positive for amphetamines as expected.     Patient encouraged to check prescription bottle description of tablet, as some pharmacies have been receiving Adderall IR from various manufactures, though the 2 should not be mixed in one bottle per prior discussions with pharmacy stafff.     Symptoms of ADHD are under good control with current dose of Adderall IR. Tolerating well, without reported side effects. Patient to contact provider if symptoms worsen or fail to improve.         1/11/23:   Continue Wellbutrin  mg by mouth  twice daily to target anxiety, attention & concentration deficit.     INCREASE Adderall IR FROM 20 mg by mouth 2 times daily (7am&6pm) AND one half tablet to equal 10 mg at midday at 12-1 pm TO 20 mg by mouth 3 TIMES DAILY to target attention and concentration deficit.  Last dispensed 12/28/22 #75/30 day supply.    Risks, benefits, side effects discussed with patient including elevated heart rate, elevated blood pressure, irritability, insomnia, sexual dysfunction, appetite suppressing properties, psychosis.  After discussion of these risks and benefits, the patient voiced understanding and agreed to proceed.  Patient instructed to contact MA directly in office when refill needed as patient is not due for refill today and has been instructed to start taking 3 of the 20 mg daily and will deplete current supply before 1/28/23.  Updated order in EHR, ordered as a NO PRINT.  Will continue to send to [facility] pharmacy due to availability of Adderall as [facility] has been running [facility] of medication.     Patient to be seen in office for next visit to sign annual CSA  "and provide UDS.    Symptoms of ADHD are under fair control with current Adderall regimen, will increase afternoon/midday dose today, patient instructed to contact provider if unable to tolerate, denies side effects.  Morning and Evening doses of Adderall IR 20 mg have been effective thus far in management of ADHD symptoms.  Coordinated care with patient for an in office visit next month, patient instructed to arrive at [facility] at 1245 2/16/23 and provide urine sample and for in office visit, so patient can leave in timely manner to arrive to employer in [place]. Informed office staff of scheduling and no need to contact patient to set up appointment. Patient to contact provider if symptoms worsen or fail to improve.       12/27/22:  TELEPHONE  Patient called asking if she can be changed to something other than Adderall (\"since it is currently unavailable, or hard to find?\").  Please advise  Please advise her to contact various pharmacies to determine availability, such as [facility], [facility], [facility] are a few I can recall that have had medication in supply. She will have to be seen before switching medications, and I wouldn't advise switching as she has done well with Adderall and it is available, though some pharmacies are not getting at routine intervals.  But she will need to call around and see which pharmacies are able to fill medication.  It looks like [facility] has filled Adderall every month.   Called and advised patient of Mallorie's response.  Patient says she will call around and see where she can find it and call me back and let me know.  Patient called around and would like for you to cancel the Rx to [facility] and resend to [facility] on [address].(she says they have it in stock.  Please resend to the [facility].  Dr. Enrique is out this week, so I will send request to [facility], last dispensed 11/28/22, patient did not  recent order from 12/16/22.     12/6/22:  Declines therapy  Continue " Wellbutrin  mg by mouth  twice daily to target anxiety, attention & concentration deficit.     Continue Adderall IR 20 mg by mouth 2 times daily (7am&6pm) AND one half tablet to equal 10 mg at midday at 12-1 pm to target attention and concentration deficit.  Last dispensed 11/28/22 #60/30 day supply.  Patient had requested short supply on 11/28/22, however, order was sent as previous prescription for twice daily dosing.  10/21/22 #75/30 days with changes. Risks, benefits, side effects discussed with patient including elevated heart rate, elevated blood pressure, irritability, insomnia, sexual dysfunction, appetite suppressing properties, psychosis.  After discussion of these risks and benefits, the patient voiced understanding and agreed to proceed.  Patient instructed to notify provider at office no later than Thurs 12/15/22 so correct prescription is ordered.  Tolerating new dosing well with reported effectiveness.     Controlled substance documentation: Ryan reviewed; prior urine drug screen consistent obtained 2/15/22; consent is up to date, signed, witnessed and in EHR, dated 2/15/22, which will be updated annually per policy. Patient is aware of risk of addiction on this medication, understands need to follow up for a review every 3 months and medications will be adjusted or decreased as deemed appropriate at each visit.  No history of drug or alcohol abuse.  No concerns about diversion or abuse. Patient denies side effects related to the medication.  Patient is aware of random urine drug screens and pill counts. The dosing of this medication will be reviewed on a regular basis and reduced if possible..  Ongoing use of a controlled substance is necessary for this patient to have a normal quality of life.  Symptoms of anxiety and sleep disturbance appear to be chronic and unrelated to medications, as patient is working 60 hrs/week and going to school full time.  Symptoms of anxiety, ADHD are under good  control with Wellbutrin and Adderall.  Patient to contact provider if symptoms worsen or fail to improve.     10/21/22:   Continue Wellbutrin  mg by mouth  twice daily to target anxiety, attention & concentration deficit.     Continue Adderall IR 20 mg by mouth twice daily to target attention and concentration deficit.  Last dispensed 9/16/22 #60/30 day supply. Instructed to continue 7 am dose and take 2nd dose later at 6 pm.   ADD Adderall IR 10 mg by mouth daily at 1 pm to target attention and concentration deficit. Risks, benefits, side effects discussed with patient including elevated heart rate, elevated blood pressure, irritability, insomnia, sexual dysfunction, appetite suppressing properties, psychosis.  After discussion of these risks and benefits, the patient voiced understanding and agreed to proceed.   Informed patient order would be sent to Dr. Enrique in separate entry for signature due to EMR system, and will not see as refilled on AVS today.  Controlled substance documentation: Ryna reviewed; prior urine drug screen consistent obtained 2/15/22; consent is up to date, signed, witnessed and in EHR, dated 2/15/22, which will be updated annually per policy. Patient is aware of risk of addiction on this medication, understands need to follow up for a review every 3 months and medications will be adjusted or decreased as deemed appropriate at each visit.  No history of drug or alcohol abuse.  No concerns about diversion or abuse. Patient denies side effects related to the medication.  Patient is aware of random urine drug screens and pill counts. The dosing of this medication will be reviewed on a regular basis and reduced if possible..  Ongoing use of a controlled substance is necessary for this patient to have a normal quality of life.  Collaborated with  regarding adding a 3rd dose of Adderall IR as patient day starts at 7 am and ends at 12 am with work and school schedule.  Will add 10 mg  to take midday at 12-1 pm.        9/16/22:   Declines therapy  Continue Wellbutrin  mg by mouth  twice daily to target anxiety, attention & concentration deficit.   Continue Adderall IR 20 mg by mouth twice daily to target attention and concentration deficit.  Last dispensed 8/17/22 for 30 day supply #60 per   Will send order to  today. Will inquire of 3 month supply with , however, explained to patient a 90 day supply would not be ordered until stabilized on a maintanence dose, patient verbalized understanding.   Tolerating medications well, denies side effects, attention and concentration has improved with increase dose.    Controlled substance documentation: Ryan reviewed; prior urine drug screen consistent obtained 2/15/22; consent is up to date, signed, witnessed and in EHR, dated 2/15/22, which will be updated annually per policy. Patient is aware of risk of addiction on this medication, understands need to follow up for a review every 3 months and medications will be adjusted or decreased as deemed appropriate at each visit.  No history of drug or alcohol abuse.  No concerns about diversion or abuse. Patient denies side effects related to the medication.  Patient is aware of random urine drug screens and pill counts. The dosing of this medication will be reviewed on a regular basis and reduced if possible..  Ongoing use of a controlled substance is necessary for this patient to have a normal quality of life.    8/15/22:   Continue Wellbutrin  mg by mouth  twice daily to target anxiety, attention & concentration deficit. Refilled today     Increase Adderall IR from 15 mg to 20 mg by mouth twice daily to target attention and concentration deficit.  Last dispensed 7/19/22 for 30 day supply #60 per   Will send order to  today and allow first fill 8/17/22.     Will increase Adderall IR to 20 mg twice daily as patient has been experiencing inability to complete  tasks as she is shifting from one uncompleted tasks to another in between morning and mid afternoon doses of 15 mg IR.       7/11/22:   Continue Wellbutrin  mg by mouth  twice daily to target anxiety, attention & concentration deficit.      Continue Adderall IR 15 mg by mouth twice daily to target attention and concentration deficit.  Last dispensed 6/18/22 for 30 day supply #60 per   Patient doing well with current medication regimen will continue current medications and send update to  for refill of Adderall IR. Will see patient back in 5 weeks    6/13/22:   Patient tolerating Adderall 15 mg IR twice daily without difficulty, symptoms are managed well currently. Will send message to  informing of toleration and refill request, patient was informed medication would not be available for refill until closer to 6/19/22, will have patient return in 4-5 weeks closer to refill date.   Continue Wellbutrin  mg by mouth  twice daily to target anxiety, attention & concentration deficit.    Continue Adderall IR 15 mg by mouth twice daily to target attention and concentration deficit.  Last dispensed 5/19/22 for 30 day supply #60 per     5/11/22:   Continue Wellbutrin  mg by mouth  twice daily to target anxiety, attention & concentration deficit.      Will stop Adderall XR 15 mg due to hot flash sensation and ineffectiveness.   Will plan on changing Adderall IR 10 mg by mouth every afternoon to Adderall IR 15 mg by mouth twice daily to target attention and concentration deficit.  Last IR dispensed 4/18/22, will send message to Dr. Enrique regarding recommendations and symptoms, patient informed medication would not be available for  until 5/18/22, patient verbalized understanding.       4/13/22:  Continue Wellbutrin  mg by mouth  twice daily to target anxiety, attention & concentration deficit.      Start Adderall XR 15 mg by mouth daily every morning and continue  Adderall IR 10 mg by mouth every afternoon to target attention and concentration deficit.  Denies side effects of medication. Message sent to Dr. Enrique regarding an update of patient symptoms and plan.     3/15/22:   Increase Wellbutrin  mg by mouth daily to twice daily as patient reports at times taking twice daily to target anxiety, attention & concentration deficit.      Increase Adderall IR 10 mg by mouth from once daily to twice daily to target attention and concentration deficit, take one in the morning and the 2nd dose in afternoon approximately 6 hr apart.    Denies side effects of medication. Message sent to Dr. Enrique regarding an update of patient symptoms and plan.     2/15/22:   -POC UDS  -CSA signed and reviewed with patient   -Continue Wellbutrin  mg by mouth daily to target anxiety, attention & concentration deficit.    -Adderall IR 5 mg by mouth twice daily to target attention and concentration deficit, take one in the morning and the 2nd dose in afternoon approximately 6 hr apart.  Risks, benefits, side effects discussed with patient including elevated heart rate, elevated blood pressure, irritability, insomnia, sexual dysfunction, appetite suppressing properties, psychosis.  After discussion of these risks and benefits, the patient voiced understanding and agreed to proceed. UDS ordered, Ryan reviewed. Informed patient medication would not be ordered until UDS results received, and would be ordered per Dr. Enrique.   Adderall IR 10 mg by mouth daily was ordered per  on 2/15/22      11/30/22:   Change Wellbutrin  mg twice daily to Wellbutrin  mg by mouth daily in the am to target anxiety, attention & concentration deficit.    ADHD testing referral to Al Cole with [facility] in [place], [place] as patient prefers to see provider closer to home.  List given of providers (4) for testing, patient to contact to schedule appointment.    Patient screened  positive for depression based on a PHQ-9 score of 4 on 2/15/2024. Follow-up recommendations include: Prescribed antidepressant medication treatment and Suicide Risk Assessment performed. (PHQ2-0)       TREATMENT PLAN/GOALS: Continue supportive psychotherapy efforts and medications as indicated. Treatment and medication options discussed during today's visit. Patient acknowledged and verbally consented to continue with current treatment plan and was educated on the importance of compliance with treatment and follow-up appointments.    MEDICATION ISSUES:  JOVITA reviewed as expected.  Discussed medication options and treatment plan of prescribed medication as well as the risks, benefits, and side effects including potential falls, possible impaired driving and metabolic adversities among others. Patient is agreeable to call the office with any worsening of symptoms or onset of side effects. Patient is agreeable to call 911 or go to the nearest ER should he/she begin having SI/HI. No medication side effects or related complaints today.     MEDS ORDERED DURING VISIT:  No orders of the defined types were placed in this encounter.      Return in about 3 months (around 8/15/2024) for Video visit, medication check.          I spent 33 minutes caring for Vickie on this date of service. This time includes time spent by me in the following activities: preparing for the visit, performing a medically appropriate examination and/or evaluation, counseling and educating the patient/family/caregiver, referring and communicating with other health care professionals, documenting information in the medical record, care coordination, and scheduling         This document has been electronically signed by NIKUNJ Neri  May 15, 2024 08:58 EDT      Part of this note may be an electronic transcription/translation of spoken language to printed text using the Dragon Dictation System.

## 2024-05-16 RX ORDER — DEXTROAMPHETAMINE SACCHARATE, AMPHETAMINE ASPARTATE, DEXTROAMPHETAMINE SULFATE AND AMPHETAMINE SULFATE 5; 5; 5; 5 MG/1; MG/1; MG/1; MG/1
20 TABLET ORAL 3 TIMES DAILY
Qty: 270 TABLET | Refills: 0 | Status: SHIPPED | OUTPATIENT
Start: 2024-05-16 | End: 2024-08-14

## 2024-05-16 NOTE — TELEPHONE ENCOUNTER
"PT REQUESTING REFILL ON HER ADDERALL 20 MG TABLETS.    amphetamine-dextroamphetamine (Adderall) 20 MG tablet (04/26/2024)     FOLLOW UP APPT ON 08/14/2024.  PT LAST SEEN ON 05/15/2024.    \"Patient Comment: I spoke with Chiara. They said send over the prescription and I should get it by the 26th.\"   "

## 2024-08-14 ENCOUNTER — TELEMEDICINE (OUTPATIENT)
Dept: PSYCHIATRY | Facility: CLINIC | Age: 54
End: 2024-08-14
Payer: COMMERCIAL

## 2024-08-14 DIAGNOSIS — Z79.899 MEDICATION MANAGEMENT: ICD-10-CM

## 2024-08-14 DIAGNOSIS — F90.0 ADHD (ATTENTION DEFICIT HYPERACTIVITY DISORDER), INATTENTIVE TYPE: ICD-10-CM

## 2024-08-14 DIAGNOSIS — F41.1 GENERALIZED ANXIETY DISORDER: ICD-10-CM

## 2024-08-14 DIAGNOSIS — F90.0 ADHD (ATTENTION DEFICIT HYPERACTIVITY DISORDER), INATTENTIVE TYPE: Primary | ICD-10-CM

## 2024-08-14 DIAGNOSIS — F43.21 FEELING GRIEF: ICD-10-CM

## 2024-08-14 PROCEDURE — 99214 OFFICE O/P EST MOD 30 MIN: CPT | Performed by: NURSE PRACTITIONER

## 2024-08-14 RX ORDER — HYDROCODONE BITARTRATE AND ACETAMINOPHEN 5; 325 MG/1; MG/1
TABLET ORAL
COMMUNITY
Start: 2024-08-12

## 2024-08-14 RX ORDER — DEXTROAMPHETAMINE SACCHARATE, AMPHETAMINE ASPARTATE, DEXTROAMPHETAMINE SULFATE AND AMPHETAMINE SULFATE 5; 5; 5; 5 MG/1; MG/1; MG/1; MG/1
20 TABLET ORAL 3 TIMES DAILY
Qty: 270 TABLET | Refills: 0 | Status: SHIPPED | OUTPATIENT
Start: 2024-08-15 | End: 2024-11-13

## 2024-08-14 NOTE — PATIENT INSTRUCTIONS
"     SPECIFIC RECOMMENDATIONS:     1.      Medications discussed at this encounter:                   -  Refilled Adderall IR 20 mg 3 times daily for 90 day supply to [facility], order sent to Dr. Enrique for signature.     2.      Psychotherapy recommendations: . Declined     3.     Return to clinic: 3 months Wed. 11/13/24 at 0840 am via video    Please arrive at least 15 minutes before your scheduled appointment time to complete check in process.      IF you are scheduled for a [employer] VIDEO visit, YOU MUST COMPLETE THE \"E-CHECK IN\" PROCESS PRIOR TO BEGINNING THE VISIT, YOU WILL NO LONGER RECEIVE A PHONE CALL PRIOR TO ALL VIDEO VISITS; You may still complete the E-Check in for in office visits prior to appointment, you will receive multiple text/email reminders which will direct you further if needed.           "

## 2024-08-14 NOTE — PROGRESS NOTES
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     Feeling tired or having little energy?     Poor appetite or overeating?     Feeling bad about yourself - or that you are a failure or have let yourself or your family down?     Trouble concentrating on things, such as reading the newspaper or watching television?     Moving or speaking so slowly that other people could have noticed? Or the opposite - being so fidgety or restless that you have been moving around a lot more than usual?     Thoughts that you would be better off dead, or of hurting yourself in some way?     PHQ-9 Total Score     If you checked off any problems, how difficult have these problems made it for you to do your work, take care of things at home, or get along with other people?            MARCIANO-7    2/15/2024 9    The following portions of the patient's history were reviewed and updated as appropriate: allergies, current medications, past family history, past medical history, past social history, and past surgical history.     Past Surgical History:  History reviewed. No pertinent surgical history.    Problem List:  Patient Active Problem List   Diagnosis    Anxiety    Essential hypertension    IBS (irritable bowel syndrome)       Allergy:   No Known Allergies     Discontinued Medications:  Medications Discontinued During This Encounter   Medication Reason    clindamycin (CLEOCIN) 300 MG capsule Historical Med - Therapy completed    diphenhydrAMINE HCl, Sleep, (Sleep Aid) 50  MG capsule Historical Med - Therapy completed    melatonin 5 MG tablet tablet Historical Med - Therapy completed    traZODone (DESYREL) 50 MG tablet Historical Med - Therapy completed               Current Medications:   Current Outpatient Medications   Medication Sig Dispense Refill    HYDROcodone-acetaminophen (NORCO) 5-325 MG per tablet       amphetamine-dextroamphetamine (Adderall) 20 MG tablet Take 1 tablet by mouth 3 (Three) Times a Day for 90 days. Indications: Attention Deficit Hyperactivity Disorder 270 tablet 0    Ascorbic Acid (VITAMIN C PO) Take  by mouth.      buPROPion SR (WELLBUTRIN SR) 150 MG 12 hr tablet TAKE 1 TABLET BY MOUTH TWICE A DAY FOR ADHD 180 tablet 3    diclofenac (VOLTAREN) 75 MG EC tablet TAKE 1 TABLET 2 TIMES DAILYAS NEEDED FOR (ARTHRITIS   PAIN) 180 tablet 0    doxycycline (VIBRAMYCIN) 100 MG capsule Take 1 capsule by mouth 2 (Two) Times a Day As Needed (outbreaks). 30 capsule 1    estradiol (VIVELLE-DOT) 0.025 MG/24HR patch       fluconazole (DIFLUCAN) 150 MG tablet       lisinopril (PRINIVIL,ZESTRIL) 20 MG tablet Take 1 tablet by mouth Daily. 90 tablet 3    metFORMIN ER (GLUCOPHAGE-XR) 500 MG 24 hr tablet       multivitamin with minerals tablet tablet Take 1 tablet by mouth Daily.      Progesterone (PROMETRIUM) 100 MG capsule       Zinc 100 MG tablet Take  by mouth Daily.       No current facility-administered medications for this visit.       Past Medical History:  Past Medical History:   Diagnosis Date    Anxiety        Past Psychiatric History:  Began Treatment: 11/30/21  Diagnoses:Anxiety and concentration deficit  Psychiatrist:Lewisies  Therapist:Denies  Admission History:Denies  Medication Trials: Zoloft [id number] during father illness, very high stress- worked well on same low dose for 10 yrs,last [id number]; another med like Ativan prn uncertain of name though made pt cry often, then switched to Ativan prn  Adderall XR 3 weeks increased hot flashes, not effective; Trazodone 50  "mg nightly effective, stopped 2024 no longer needed.  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\"  Kids:Yes or If so, how many 1 son  House:Lives in a house alone in [facility] in [place], [place], significant other lives in cabin on property   History: Denies  Access to Guns:  Yes, unlocked    Social History     Socioeconomic History    Marital status: Significant Other    Number of children: 1   Tobacco Use    Smoking status: Every Day     Current packs/day: 0.50     Average packs/day: 0.5 packs/day for 38.6 years (19.3 ttl pk-yrs)     Types: Cigarettes     Start date:     Smokeless tobacco: Never   Vaping Use    Vaping status: Some Days    Substances: Nicotine, Flavoring    Devices: Disposable, Pre-filled or refillable cartridge, Pre-filled pod   Substance and Sexual Activity    Alcohol use: Yes     Comment: occassionally    Drug use: Never    Sexual activity: Defer       Family History:   Suicide Attempts: Denies  Suicide Completions:Denies      Family History   Problem Relation Age of Onset    ADD / ADHD Son        Developmental History:   Born: [place]  Siblings:2 brother, 1 sister  Childhood:  Step mother- physical, mental, verbal for approx 5 yrs -began in Middle school, stopped in  when moved out of house at age 15  High School:Completed  College: Currently enrolled online school for psychology 4 yr degree started May 2021- promotion required to have degree for employer    Mental Status Exam:   Hygiene:   good  Cooperation:  Cooperative  Eye Contact:  Good  Psychomotor Behavior:  Appropriate  Affect:  Appropriate  Mood: euthymic   Speech:  Normal  Thought Process:  Goal " directed  Thought Content:  Normal  Suicidal:  None  Homicidal:  None  Hallucinations:  None  Delusion:  None  Memory:  Intact  Orientation:  Person, Place, Time and Situation  Reliability:  good  Insight:  Good  Judgement:  Good  Impulse Control:  Good  Physical/Medical Issues:  Yes HTN, IBS      Review of Systems:  Review of Systems   Constitutional: Negative.  Negative for diaphoresis and fatigue.   HENT:  Negative for sinus pressure, sore throat and trouble swallowing.    Respiratory:  Negative for cough and shortness of breath.    Cardiovascular:  Negative for chest pain, palpitations and leg swelling.   Gastrointestinal:  Negative for diarrhea, nausea and vomiting.   Endocrine: Negative for cold intolerance and heat intolerance.   Genitourinary:  Negative for difficulty urinating.   Musculoskeletal: Negative.  Negative for joint swelling.   Allergic/Immunologic: Negative for immunocompromised state.   Neurological: Negative.  Negative for dizziness, seizures, speech difficulty and numbness.   Psychiatric/Behavioral:  Negative for decreased concentration, hallucinations, self-injury, sleep disturbance and suicidal ideas. The patient is not nervous/anxious and is not hyperactive.          Physical Exam:  Physical Exam  Psychiatric:         Attention and Perception: Attention and perception normal.         Mood and Affect: Mood and affect normal. Affect is tearful.         Speech: Speech normal.         Behavior: Behavior normal. Behavior is cooperative.         Thought Content: Thought content normal. Thought content does not include suicidal ideation. Thought content does not include suicidal plan.         Cognition and Memory: Cognition and memory normal.         Judgment: Judgment normal.      Comments: Tearful upon speaking of loss of dog recently         Vital Signs:   There were no vitals taken for this visit.     Lab Results:   Office Visit on 02/16/2024   Component Date Value Ref Range Status     Amphetamine Screen, Urine 02/16/2024 Positive (A)  Negative Final    Barbiturates Screen, Urine 02/16/2024 Negative  Negative Final    Buprenorphine, Screen, Urine 02/16/2024 Negative  Negative Final    Benzodiazepine Screen, Urine 02/16/2024 Negative  Negative Final    Cocaine Screen, Urine 02/16/2024 Negative  Negative Final    MDMA (ECSTASY) 02/16/2024 Negative  Negative Final    Methamphetamine, Ur 02/16/2024 Negative  Negative Final    Morphine/Opiates Screen, Urine 02/16/2024 Negative  Negative Final    Methadone Screen, Urine 02/16/2024 Negative  Negative Final    Oxycodone Screen, Urine 02/16/2024 Negative  Negative Final    Phencyclidine (PCP), Urine 02/16/2024 Negative  Negative Final    THC, Screen, Urine 02/16/2024 Negative  Negative Final    Lot Number 02/16/2024 [id number]   Final    Expiration Date 02/16/2024 11-9-2025   Final       EKG Results:  No orders to display       Imaging Results:  No Images in the past 120 days found..      Assessment & Plan   Diagnoses and all orders for this visit:    1. ADHD (attention deficit hyperactivity disorder), inattentive type (Primary)    2. Feeling grief    3. Generalized anxiety disorder    4. Medication management                  Visit Diagnoses:    ICD-10-CM ICD-9-CM   1. ADHD (attention deficit hyperactivity disorder), inattentive type  F90.0 314.00   2. Feeling grief  F43.21 309.0   3. Generalized anxiety disorder  F41.1 300.02   4. Medication management  Z79.899 V58.69                 PLAN:  Safety: No acute safety concerns  Therapy: Declines  Risk Assessment: Risk of self-harm acutely is low.  Risk factors include anxiety disorder, access to guns/weapons, and recent psychosocial stressors (pandemic). Protective factors include no family history, no present SI, no history of suicide attempts or self-harm in the past, minimal AODA, healthcare seeking, future orientation, willingness to engage in care.  Risk of self-harm chronically is also low, but could be  further elevated in the event of treatment noncompliance and/or AODA.  Meds:   -Continue Wellbutrin  mg by mouth twice daily to target ADHD and mood.   -Continue Adderall IR 20 mg by mouth 3 TIMES DAILY to target attention and concentration deficit, symptoms of ADHD.  Last dispensed 5/20/24 #270/90 day supply.  Risks, benefits, side effects discussed with patient including elevated heart rate, elevated blood pressure, irritability, insomnia, sexual dysfunction, appetite suppressing properties, psychosis.  After discussion of these risks and benefits, the patient voiced understanding and agreed to proceed. Refilled today for 90 day supply to [facility].  Informed patient order would be sent to Dr. Enrique in separate entry for signature due to EMR system, and will not see as refilled on AVS today.   Controlled substance documentation: Ryan reviewed; prior urine drug screen consistent obtained 2/16/24; consent is up to date, signed, witnessed and in EHR, dated 2/16/24, which will be updated annually per policy. Patient is aware of risk of addiction on this medication, understands need to follow up for a review every 3 months and medications will be adjusted or decreased as deemed appropriate at each visit.  No history of drug or alcohol abuse.  No concerns about diversion or abuse. Patient denies side effects related to the medication.  Patient is aware of random urine drug screens and pill counts. The dosing of this medication will be reviewed on a regular basis and reduced if possible. Ongoing use of a controlled substance is necessary for this patient to have a normal quality of life.    Discontinued Trazodone from EHR as patient is no longer taking due to not needed.    Labs:  n/a    Symptoms of anxiety and ADHD are under good control with current medication regimen.  Patient is experiencing grief over loss of dog, emotional support provided.   The plan was discussed with the patient. The patient was  given time to ask questions and these questions were answered. At the conclusion of their visit they had no additional questions or concerns and all questions were answered to their satisfaction.   Patient was given instructions and counseling regarding condition and for health maintenance advice. Please see specific information pulled into the AVS if appropriate.    Patient to contact provider if symptoms worsen or fail to improve.      5/14/24:  -Continue Wellbutrin  mg by mouth twice daily to target ADHD and mood.   -Continue Adderall IR 20 mg by mouth 3 TIMES DAILY to target attention and concentration deficit, symptoms of ADHD.  Last dispensed 4/26/24 #90/30 day supply.  Risks, benefits, side effects discussed with patient including elevated heart rate, elevated blood pressure, irritability, insomnia, sexual dysfunction, appetite suppressing properties, psychosis.  After discussion of these risks and benefits, the patient voiced understanding and agreed to proceed. Patient instructed to request refill for Adderall via Tap[employer] when needed.  Patient to contact [facility] mail order pharmacy to determine turn around time for delivery, cost of a 90 day supply of Adderall IR, and if feasible and able to set up patient to indicate on refill request which may need to be submitted 7-10 days prior to due date initially, though patient will inform provider further after she calls to determine details. Will plan for a 90 day supply with next refill request.   Symptoms of anxiety and ADHD are under good control with current medication regimen.    The plan was discussed with the patient. The patient was given time to ask questions and these questions were answered. At the conclusion of their visit they had no additional questions or concerns and all questions were answered to their satisfaction.   Patient was given instructions and counseling regarding condition and for health maintenance advice. Please see specific  information pulled into the AVS if appropriate.    Patient to contact provider if symptoms worsen or fail to improve.      2/16/24:  -Continue Wellbutrin  mg by mouth twice daily to target  ADHD and mood. NR, Patient instructed to request refills when appropriate, when down to 5-7 days remaining via  pharmacy or via MyChart.       -Continue Adderall IR 20 mg by mouth 3 TIMES DAILY to target attention and concentration deficit, symptoms of ADHD.  Last dispensed 1/27/24 #90/30 day supply. Patient instructed to request refill for Adderall via MyChart when needed.    Controlled substance documentation: Ryan reviewed; prior urine drug screen consistent obtained today 2/16/24; consent is up to date, signed, witnessed and in EHR, dated today 2/16/24, which will be updated annually per policy. Patient is aware of risk of addiction on this medication, understands need to follow up for a review every 3 months and medications will be adjusted or decreased as deemed appropriate at each visit.  No history of drug or alcohol abuse.  No concerns about diversion or abuse. Patient denies side effects related to the medication.  Patient is aware of random urine drug screens and pill counts. The dosing of this medication will be reviewed on a regular basis and reduced if possible. Ongoing use of a controlled substance is necessary for this patient to have a normal quality of life.  -Continue Trazodone 50 mg by mouth nightly as needed to target insomnia.  Managed per PCP.  -Labs: POC UDS today in clinic, results noted as positive amphetamines as expected, patient informed.      Symptoms of anxiety, insomnia, ADHD are under good control with current medication regimen.    Patient was given instructions and counseling regarding condition and for health maintenance advice. Please see specific information pulled into the AVS if appropriate.    Patient to contact provider if symptoms worsen or fail to improve.        12/18/23:    -Continue Wellbutrin  mg by mouth twice daily to target  ADHD and mood.   -Continue Adderall IR 20 mg by mouth 3 TIMES DAILY to target attention and concentration deficit, symptoms of ADHD.  Last dispensed 11/28/23 #90/30 day supply.  . Patient instructed to request refill for Adderall via MyChart when needed.  -Continue Trazodone 50 mg by mouth nightly as needed to target insomnia. Managed per PCP.    Symptoms of anxiety, depression, ADHD are under good control with current medication regimen.  Next appointment will be in office for annual CSA requirements.   Patient was given instructions and counseling regarding condition and for health maintenance advice. Please see specific information pulled into the AVS if appropriate.    Patient to contact provider if symptoms worsen or fail to improve.        9/18/23  Continue Wellbutrin  mg by mouth  twice daily to target  ADHD and mood.     Continue Adderall IR 20 mg by mouth 3 TIMES DAILY to target attention and concentration deficit, symptoms of ADHD.  Last dispensed 8/21/23 #90/30 day supply.  Risks, benefits, side effects discussed with patient including elevated heart rate, elevated blood pressure, irritability, insomnia, sexual dysfunction, appetite suppressing properties, psychosis.  After discussion of these risks and benefits, the patient voiced understanding and agreed to proceed. Will send in a 90 day supply to [facility] Mail order pharmacy for insurance, cost, and availability.  Informed patient order would be sent to Dr. Enrique in separate entry for signature due to EMR system, and will not see as refilled on AVS today.   Symptoms of ADHD are under good control with current dose of Adderall IR, Patient to contact provider if symptoms worsen or fail to improve.        7/17/23:   -Continue Wellbutrin  mg by mouth  twice daily to target  ADHD and mood. Refilled today  -Continue Adderall IR 20 mg by mouth 3 TIMES DAILY to target attention  and concentration deficit, symptoms of ADHD.  Last dispensed 6/23/23 #90/30 day supply.  Will continue to send to [facility] pharmacy due to availability of Adderall as [facility] has been running out of medication.  Patient instructed to Request refills when needed; earliest fill date for Adderall IR 20 mg 6/22/23.     Controlled substance documentation: Ryan reviewed; prior urine drug screen consistent obtained 2/16/23; consent is up to date, signed, witnessed and in EHR, dated 2/16/23, which will be updated annually per policy. Patient is aware of risk of addiction on this medication, understands need to follow up for a review every 3 months and medications will be adjusted or decreased as deemed appropriate at each visit.  No history of drug or alcohol abuse.  No concerns about diversion or abuse. Patient denies side effects related to the medication.  Patient is aware of random urine drug screens and pill counts. The dosing of this medication will be reviewed on a regular basis and reduced if possible. Ongoing use of a controlled substance is necessary for this patient to have a normal quality of life.  Symptoms of ADHD are under good control with current medication regimen.   Patient was given instructions and counseling regarding condition and for health maintenance advice. Please see specific information pulled into the AVS if appropriate.    Patient to contact provider if symptoms worsen or fail to improve.      4/17/23:  -Continue Wellbutrin  mg by mouth  twice daily to target anxiety, attention & concentration deficit.   -Continue Adderall IR 20 mg by mouth 3 TIMES DAILY to target attention and concentration deficit, symptoms of ADHD.  Last dispensed 3/27/23 #90/30 day supply.  Risks, benefits, side effects discussed with patient including elevated heart rate, elevated blood pressure, irritability, insomnia, sexual dysfunction, appetite suppressing properties, psychosis.  After discussion of these risks and  benefits, the patient voiced understanding and agreed to proceed.  Patient instructed to contact pharmacy when refill needed as patient is not due for refill today.  Will continue to send to [facility] pharmacy due to availability of Adderall as [facility] been running [facility] of medication.  Patient instructed to Request refills when needed; earliest fill date for Adderall IR 20 mg 3/25/23, request no later than 4/24/23.   Symptoms of ADHD are under good control with current dose of Adderall IR. Tolerating well, without reported side effects. Patient to contact provider if symptoms worsen or fail to improve.       2/16/23:    Continue Wellbutrin  mg by mouth twice daily to target anxiety, attention & concentration deficit.     Continue Adderall IR 20 mg by mouth 3 TIMES DAILY to target attention and concentration deficit, symptoms of ADHD.  Last dispensed 1/24/23 #90/30 day supply.  Risks, benefits, side effects discussed with patient including elevated heart rate, elevated blood pressure, irritability, insomnia, sexual dysfunction, appetite suppressing properties, psychosis.  After discussion of these risks and benefits, the patient voiced understanding and agreed to proceed.  Patient instructed to contact pharmacy when refill needed as patient is not due for refill today.  Will continue to send to [facility] pharmacy due to availability of Adderall as [facility] been [facility] of [facility].   Controlled substance documentation: Ryan reviewed; prior urine drug screen consistent obtained 2/15/22, UDS today as expected 2/16/23; consent is up to date, signed, witnessed and in EHR, dated 2/16/23, which will be updated annually per policy. Patient is aware of risk of addiction on this medication, understands need to follow up for a review every 3 months and medications will be adjusted or decreased as deemed appropriate at each visit.  No history of drug or alcohol abuse.  No concerns about diversion or abuse. Patient denies  side effects related to the medication.  Patient is aware of random urine drug screens and pill counts. The dosing of this medication will be reviewed on a regular basis and reduced if possible..  Ongoing use of a controlled substance is necessary for this patient to have a normal quality of life.  POC UDS today in clinic, results positive for amphetamines as expected.     Patient encouraged to check prescription bottle description of tablet, as some pharmacies have been receiving Adderall IR from various manufactures, though the 2 should not be mixed in one bottle per prior discussions with pharmacy stafff.     Symptoms of ADHD are under good control with current dose of Adderall IR. Tolerating well, without reported side effects. Patient to contact provider if symptoms worsen or fail to improve.         1/11/23:   Continue Wellbutrin  mg by mouth  twice daily to target anxiety, attention & concentration deficit.     INCREASE Adderall IR FROM 20 mg by mouth 2 times daily (7am&6pm) AND one half tablet to equal 10 mg at midday at 12-1 pm TO 20 mg by mouth 3 TIMES DAILY to target attention and concentration deficit.  Last dispensed 12/28/22 #75/30 day supply.    Risks, benefits, side effects discussed with patient including elevated heart rate, elevated blood pressure, irritability, insomnia, sexual dysfunction, appetite suppressing properties, psychosis.  After discussion of these risks and benefits, the patient voiced understanding and agreed to proceed.  Patient instructed to contact MA directly in office when refill needed as patient is not due for refill today and has been instructed to start taking 3 of the 20 mg daily and will deplete current supply before 1/28/23.  Updated order in EHR, ordered as a NO PRINT.  Will continue to send to [facility] pharmacy due to availability of Adderall as [facility] been [facility] of [facility].     Patient to be seen in office for next visit to sign annual CSA and provide  "UDS.    Symptoms of ADHD are under fair control with current Adderall regimen, will increase afternoon/midday dose today, patient instructed to contact provider if unable to tolerate, denies side effects.  Morning and Evening doses of Adderall IR 20 mg have been effective thus far in management of ADHD symptoms.  Coordinated care with patient for an in office visit next month, patient instructed to arrive at [facility] at 1245 2/16/23 and provide urine sample and for in office visit, so patient can leave in timely manner to arrive to employer in [place]. Informed office staff of scheduling and no need to contact patient to set up appointment. Patient to contact provider if symptoms worsen or fail to improve.       12/27/22:  TELEPHONE  Patient called asking if she can be changed to something other than Adderall (\"since it is currently unavailable, or hard to find?\").  Please advise  Please advise her to contact various pharmacies to determine availability, such as [facility], [facility], [facility] are a few I can recall that have had medication in supply. She will have to be seen before switching medications, and I wouldn't advise switching as she has done well with Adderall and it is available, though some pharmacies are not getting at routine intervals.  But she will need to call around and see which pharmacies are able to fill medication.  It looks like [facility] has filled Adderall every month.   Called and advised patient of Mallorie's response.  Patient says she will call around and see where she can find it and call me back and let me know.  Patient called around and would like for you to cancel the Rx to [facility] and resend to [facility] on [address].(she says they have it in stock.  Please resend to the [facility].  Dr. Enrique is out this week, so I will send request to NIKUNJ Reyes, last dispensed 11/28/22, patient did not  recent order from 12/16/22.     12/6/22:  Declines therapy  Continue Wellbutrin  " mg by mouth  twice daily to target anxiety, attention & concentration deficit.     Continue Adderall IR 20 mg by mouth 2 times daily (7am&6pm) AND one half tablet to equal 10 mg at midday at 12-1 pm to target attention and concentration deficit.  Last dispensed 11/28/22 #60/30 day supply.  Patient had requested short supply on 11/28/22, however, order was sent as previous prescription for twice daily dosing.  10/21/22 #75/30 days with changes. Risks, benefits, side effects discussed with patient including elevated heart rate, elevated blood pressure, irritability, insomnia, sexual dysfunction, appetite suppressing properties, psychosis.  After discussion of these risks and benefits, the patient voiced understanding and agreed to proceed.  Patient instructed to notify provider at office no later than Thurs 12/15/22 so correct prescription is ordered.  Tolerating new dosing well with reported effectiveness.     Controlled substance documentation: Ryan reviewed; prior urine drug screen consistent obtained 2/15/22; consent is up to date, signed, witnessed and in EHR, dated 2/15/22, which will be updated annually per policy. Patient is aware of risk of addiction on this medication, understands need to follow up for a review every 3 months and medications will be adjusted or decreased as deemed appropriate at each visit.  No history of drug or alcohol abuse.  No concerns about diversion or abuse. Patient denies side effects related to the medication.  Patient is aware of random urine drug screens and pill counts. The dosing of this medication will be reviewed on a regular basis and reduced if possible..  Ongoing use of a controlled substance is necessary for this patient to have a normal quality of life.  Symptoms of anxiety and sleep disturbance appear to be chronic and unrelated to medications, as patient is working 60 hrs/week and going to school full time.  Symptoms of anxiety, ADHD are under good control with  Wellbutrin and Adderall.  Patient to contact provider if symptoms worsen or fail to improve.     10/21/22:   Continue Wellbutrin  mg by mouth  twice daily to target anxiety, attention & concentration deficit.     Continue Adderall IR 20 mg by mouth twice daily to target attention and concentration deficit.  Last dispensed 9/16/22 #60/30 day supply. Instructed to continue 7 am dose and take 2nd dose later at 6 pm.   ADD Adderall IR 10 mg by mouth daily at 1 pm to target attention and concentration deficit. Risks, benefits, side effects discussed with patient including elevated heart rate, elevated blood pressure, irritability, insomnia, sexual dysfunction, appetite suppressing properties, psychosis.  After discussion of these risks and benefits, the patient voiced understanding and agreed to proceed.   Informed patient order would be sent to Dr. Enrique in separate entry for signature due to EMR system, and will not see as refilled on AVS today.  Controlled substance documentation: Ryan reviewed; prior urine drug screen consistent obtained 2/15/22; consent is up to date, signed, witnessed and in EHR, dated 2/15/22, which will be updated annually per policy. Patient is aware of risk of addiction on this medication, understands need to follow up for a review every 3 months and medications will be adjusted or decreased as deemed appropriate at each visit.  No history of drug or alcohol abuse.  No concerns about diversion or abuse. Patient denies side effects related to the medication.  Patient is aware of random urine drug screens and pill counts. The dosing of this medication will be reviewed on a regular basis and reduced if possible..  Ongoing use of a controlled substance is necessary for this patient to have a normal quality of life.  Collaborated with  regarding adding a 3rd dose of Adderall IR as patient day starts at 7 am and ends at 12 am with work and school schedule.  Will add 10 mg to take  midday at 12-1 pm.        9/16/22:   Declines therapy  Continue Wellbutrin  mg by mouth  twice daily to target anxiety, attention & concentration deficit.   Continue Adderall IR 20 mg by mouth twice daily to target attention and concentration deficit.  Last dispensed 8/17/22 for 30 day supply #60 per   Will send order to  today. Will inquire of 3 month supply with , however, explained to patient a 90 day supply would not be ordered until stabilized on a maintanence dose, patient verbalized understanding.   Tolerating medications well, denies side effects, attention and concentration has improved with increase dose.    Controlled substance documentation: Ryan reviewed; prior urine drug screen consistent obtained 2/15/22; consent is up to date, signed, witnessed and in EHR, dated 2/15/22, which will be updated annually per policy. Patient is aware of risk of addiction on this medication, understands need to follow up for a review every 3 months and medications will be adjusted or decreased as deemed appropriate at each visit.  No history of drug or alcohol abuse.  No concerns about diversion or abuse. Patient denies side effects related to the medication.  Patient is aware of random urine drug screens and pill counts. The dosing of this medication will be reviewed on a regular basis and reduced if possible..  Ongoing use of a controlled substance is necessary for this patient to have a normal quality of life.    8/15/22:   Continue Wellbutrin  mg by mouth  twice daily to target anxiety, attention & concentration deficit. Refilled today     Increase Adderall IR from 15 mg to 20 mg by mouth twice daily to target attention and concentration deficit.  Last dispensed 7/19/22 for 30 day supply #60 per   Will send order to  today and allow first fill 8/17/22.     Will increase Adderall IR to 20 mg twice daily as patient has been experiencing inability to complete tasks as  she is shifting from one uncompleted tasks to another in between morning and mid afternoon doses of 15 mg IR.       7/11/22:   Continue Wellbutrin  mg by mouth  twice daily to target anxiety, attention & concentration deficit.      Continue Adderall IR 15 mg by mouth twice daily to target attention and concentration deficit.  Last dispensed 6/18/22 for 30 day supply #60 per   Patient doing well with current medication regimen will continue current medications and send update to  for refill of Adderall IR. Will see patient back in 5 weeks    6/13/22:   Patient tolerating Adderall 15 mg IR twice daily without difficulty, symptoms are managed well currently. Will send message to  informing of toleration and refill request, patient was informed medication would not be available for refill until closer to 6/19/22, will have patient return in 4-5 weeks closer to refill date.   Continue Wellbutrin  mg by mouth  twice daily to target anxiety, attention & concentration deficit.    Continue Adderall IR 15 mg by mouth twice daily to target attention and concentration deficit.  Last dispensed 5/19/22 for 30 day supply #60 per     5/11/22:   Continue Wellbutrin  mg by mouth  twice daily to target anxiety, attention & concentration deficit.      Will stop Adderall XR 15 mg due to hot flash sensation and ineffectiveness.   Will plan on changing Adderall IR 10 mg by mouth every afternoon to Adderall IR 15 mg by mouth twice daily to target attention and concentration deficit.  Last IR dispensed 4/18/22, will send message to Dr. Enrique regarding recommendations and symptoms, patient informed medication would not be available for  until 5/18/22, patient verbalized understanding.       4/13/22:  Continue Wellbutrin  mg by mouth  twice daily to target anxiety, attention & concentration deficit.      Start Adderall XR 15 mg by mouth daily every morning and continue Adderall IR  10 mg by mouth every afternoon to target attention and concentration deficit.  Denies side effects of medication. Message sent to Dr. Enrique regarding an update of patient symptoms and plan.     3/15/22:   Increase Wellbutrin  mg by mouth daily to twice daily as patient reports at times taking twice daily to target anxiety, attention & concentration deficit.      Increase Adderall IR 10 mg by mouth from once daily to twice daily to target attention and concentration deficit, take one in the morning and the 2nd dose in afternoon approximately 6 hr apart.    Denies side effects of medication. Message sent to Dr. Enrique regarding an update of patient symptoms and plan.     2/15/22:   -POC UDS  -CSA signed and reviewed with patient   -Continue Wellbutrin  mg by mouth daily to target anxiety, attention & concentration deficit.    -Adderall IR 5 mg by mouth twice daily to target attention and concentration deficit, take one in the morning and the 2nd dose in afternoon approximately 6 hr apart.  Risks, benefits, side effects discussed with patient including elevated heart rate, elevated blood pressure, irritability, insomnia, sexual dysfunction, appetite suppressing properties, psychosis.  After discussion of these risks and benefits, the patient voiced understanding and agreed to proceed. UDS ordered, Ryan reviewed. Informed patient medication would not be ordered until UDS results received, and would be ordered per Dr. Enrique.   Adderall IR 10 mg by mouth daily was ordered per  on 2/15/22      11/30/22:   Change Wellbutrin  mg twice daily to Wellbutrin  mg by mouth daily in the am to target anxiety, attention & concentration deficit.    ADHD testing referral to Al Cole with [facility] in [place], [place] as patient prefers to see provider closer to home.  List given of providers (4) for testing, patient to contact to schedule appointment.    Patient screened positive for  depression based on a PHQ-9 score of 4 on 2/15/2024. Follow-up recommendations include: Prescribed antidepressant medication treatment and Suicide Risk Assessment performed. (PHQ2-0)       TREATMENT PLAN/GOALS: Continue supportive psychotherapy efforts and medications as indicated. Treatment and medication options discussed during today's visit. Patient acknowledged and verbally consented to continue with current treatment plan and was educated on the importance of compliance with treatment and follow-up appointments.    MEDICATION ISSUES:  JOVITA reviewed as expected.  Discussed medication options and treatment plan of prescribed medication as well as the risks, benefits, and side effects including potential falls, possible impaired driving and metabolic adversities among others. Patient is agreeable to call the office with any worsening of symptoms or onset of side effects. Patient is agreeable to call 911 or go to the nearest ER should he/she begin having SI/HI. No medication side effects or related complaints today.     MEDS ORDERED DURING VISIT:  No orders of the defined types were placed in this encounter.      Return in about 3 months (around 11/14/2024) for Video visit, medication check.          I spent 25 minutes caring for Vickie on this date of service. This time includes time spent by me in the following activities: preparing for the visit, performing a medically appropriate examination and/or evaluation, counseling and educating the patient/family/caregiver, ordering medications, tests, or procedures, referring and communicating with other health care professionals, documenting information in the medical record, care coordination, and scheduling   (Ordering of medications will be seen in a separate encounter due to EHR for Adderall)       This document has been electronically signed by NIKUNJ Neri  August 14, 2024 09:36 EDT      Part of this note may be an electronic transcription/translation of  spoken language to printed text using the Dragon Dictation System.

## 2024-11-13 ENCOUNTER — TELEMEDICINE (OUTPATIENT)
Dept: PSYCHIATRY | Facility: CLINIC | Age: 54
End: 2024-11-13
Payer: COMMERCIAL

## 2024-11-13 DIAGNOSIS — F90.0 ADHD (ATTENTION DEFICIT HYPERACTIVITY DISORDER), INATTENTIVE TYPE: Primary | ICD-10-CM

## 2024-11-13 DIAGNOSIS — F90.0 ADHD (ATTENTION DEFICIT HYPERACTIVITY DISORDER), INATTENTIVE TYPE: ICD-10-CM

## 2024-11-13 DIAGNOSIS — F41.1 GENERALIZED ANXIETY DISORDER: ICD-10-CM

## 2024-11-13 DIAGNOSIS — Z79.899 MEDICATION MANAGEMENT: ICD-10-CM

## 2024-11-13 RX ORDER — DEXTROAMPHETAMINE SACCHARATE, AMPHETAMINE ASPARTATE, DEXTROAMPHETAMINE SULFATE AND AMPHETAMINE SULFATE 5; 5; 5; 5 MG/1; MG/1; MG/1; MG/1
20 TABLET ORAL 3 TIMES DAILY
Qty: 270 TABLET | Refills: 0 | Status: SHIPPED | OUTPATIENT
Start: 2024-11-14 | End: 2025-02-12

## 2024-11-13 RX ORDER — CLOBETASOL PROPIONATE 0.5 MG/G
1 CREAM TOPICAL EVERY 12 HOURS SCHEDULED
COMMUNITY
Start: 2024-09-17

## 2024-11-13 RX ORDER — CYCLOBENZAPRINE HCL 5 MG
TABLET ORAL EVERY 24 HOURS
COMMUNITY
Start: 2024-11-11 | End: 2025-03-11

## 2024-11-13 NOTE — PROGRESS NOTES
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 I am not busy I become bored\", now that patient lives alone with dogs and school, finds that mind is wandering all the time.  Has never liked school growing up, had very low GPA throughout school, C average. Now that started college in May 2021, has a 4.0 due to forcing self, has received first B, admit to not liking to fail.   Assignments are due Sundays and Thursday. And completes one assignment on Monday that is due Thurs.  Yesterday up at 7 am, had read assignment several times over the past weekend, discussion board posting, knew what was needed, it took until 11 am to complete as patient becomes easily distracted.  Explained assignment as fairly easy and should have been done in a shorter duration.  Carries a notebook at work, at end of night has to go through every email that was already read, review notebook, text messages to ensure everything has been addressed.  Frequent small tasks get lost.  Still misses things even when written down.   [place] at age 17 to go into one room and to not leave room until clean, when clothes in washer they go to dryer then folding and put away.  More scattered house work now, admits to not liking to vacuum as vacuum sits out for a week.  Grew up in spot free home which carried over into adulthood.     Chart review completed prior to visit.   See flow sheet for ADHD self report scale symptom checklist, " which will be scanned into chart.      PHQ-9 Depression Screening  PHQ-9 Total Score:    2/16/24:4; PHQ2-0     Little interest or pleasure in doing things?     Feeling down, depressed, or hopeless?     Trouble falling or staying asleep, or sleeping too much?     Feeling tired or having little energy?     Poor appetite or overeating?     Feeling bad about yourself - or that you are a failure or have let yourself or your family down?     Trouble concentrating on things, such as reading the newspaper or watching television?     Moving or speaking so slowly that other people could have noticed? Or the opposite - being so fidgety or restless that you have been moving around a lot more than usual?     Thoughts that you would be better off dead, or of hurting yourself in some way?     PHQ-9 Total Score     If you checked off any problems, how difficult have these problems made it for you to do your work, take care of things at home, or get along with other people?            MARCIANO-7     2/16/24: 9    The following portions of the patient's history were reviewed and updated as appropriate: allergies, current medications, past family history, past medical history, past social history, and past surgical history.     Past Surgical History:  History reviewed. No pertinent surgical history.    Problem List:  Patient Active Problem List   Diagnosis    Anxiety    Essential hypertension    IBS (irritable bowel syndrome)       Allergy:   No Known Allergies     Discontinued Medications:  Medications Discontinued During This Encounter   Medication Reason    HYDROcodone-acetaminophen (NORCO) 5-325 MG per tablet Historical Med - Therapy completed                 Current Medications:   Current Outpatient Medications   Medication Sig Dispense Refill    clobetasol propionate (TEMOVATE) 0.05 % cream 1 Application Every 12 (Twelve) Hours.      cyclobenzaprine (FLEXERIL) 5 MG tablet Daily.      amphetamine-dextroamphetamine (Adderall) 20 MG  tablet Take 1 tablet by mouth 3 (Three) Times a Day for 90 days. Indications: Attention Deficit Hyperactivity Disorder 270 tablet 0    Ascorbic Acid (VITAMIN C PO) Take  by mouth.      buPROPion SR (WELLBUTRIN SR) 150 MG 12 hr tablet TAKE 1 TABLET BY MOUTH TWICE A DAY FOR ADHD 180 tablet 3    diclofenac (VOLTAREN) 75 MG EC tablet TAKE 1 TABLET 2 TIMES DAILYAS NEEDED FOR (ARTHRITIS   PAIN) 180 tablet 0    doxycycline (VIBRAMYCIN) 100 MG capsule Take 1 capsule by mouth 2 (Two) Times a Day As Needed (outbreaks). 30 capsule 1    estradiol (VIVELLE-DOT) 0.025 MG/24HR patch       fluconazole (DIFLUCAN) 150 MG tablet       lisinopril (PRINIVIL,ZESTRIL) 20 MG tablet Take 1 tablet by mouth Daily. 90 tablet 3    metFORMIN ER (GLUCOPHAGE-XR) 500 MG 24 hr tablet       multivitamin with minerals tablet tablet Take 1 tablet by mouth Daily.      Progesterone (PROMETRIUM) 100 MG capsule       Zinc 100 MG tablet Take  by mouth Daily.       No current facility-administered medications for this visit.       Past Medical History:  Past Medical History:   Diagnosis Date    Anxiety        Past Psychiatric History:  Began Treatment: 20  Diagnoses:Anxiety and concentration deficit  Psychiatrist:Denies  Therapist:Denies  Admission History:Denies  Medication Trials: Zoloft [id number] during father illness, very high stress- worked well on same low dose for 10 yrs,last [id number]; another med like Ativan prn uncertain of name though made pt cry often, then switched to Ativan prn  Adderall XR 3 weeks increased hot flashes, not effective; Trazodone 50 mg nightly effective, stopped 2024 no longer needed.  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\"  Kids:Yes or If so, how many 1 son  House:Lives in a house alone in trailer in [place], [place], significant other lives in cabin on property   History: Denies  Access to Guns:  Yes, unlocked    Social History     Socioeconomic History    Marital status: Significant Other    Number of children: 1   Tobacco Use    Smoking status: Every Day     Current packs/day: 0.50     Average packs/day: 0.5 packs/day for 38.9 years (19.4 ttl pk-yrs)     Types: Cigarettes     Start date: 1986    Smokeless tobacco: Never   Vaping Use    Vaping status: Some Days    Substances: Nicotine, Flavoring    Devices: Disposable, Pre-filled or refillable cartridge, Pre-filled pod   Substance and Sexual Activity    Alcohol use: Yes     Comment: occassionally    Drug use: Never    Sexual activity: Defer       Family History:   Suicide Attempts: Denies  Suicide Completions:Denies      Family History   Problem Relation Age of Onset    ADD / ADHD Son        Developmental History:   Born: [place]  Siblings:2 brother, 1 sister  Childhood:  Step mother- physical, mental, verbal for approx 5 yrs -began in Middle school, stopped in  when moved out of house at age 15  High School:Completed  College: Currently enrolled online school for psychology 4 yr degree started May 2021- promotion required to have degree for employer    Mental Status Exam:   Hygiene:   good  Cooperation:  Cooperative  Eye Contact:  Good  Psychomotor Behavior:  Appropriate  Affect:  Appropriate  Mood: euthymic   Speech:  Normal  Thought Process:  Goal directed  Thought Content:  Normal  Suicidal:  None  Homicidal:  None  Hallucinations:  None  Delusion:  None  Memory:  Intact  Orientation:  Person, Place, Time and Situation  Reliability:  good  Insight:  Good  Judgement:  Good  Impulse Control:  Good  Physical/Medical Issues:  Yes HTN, IBS      Review of Systems:  Review of Systems   Constitutional: Negative.  Negative for diaphoresis and fatigue.   HENT:  " Negative for sinus pressure, sore throat and trouble swallowing.    Respiratory:  Negative for cough and shortness of breath.    Cardiovascular:  Negative for chest pain, palpitations and leg swelling.   Gastrointestinal:  Negative for diarrhea, nausea and vomiting.   Endocrine: Negative for cold intolerance and heat intolerance.   Genitourinary:  Negative for difficulty urinating.   Musculoskeletal: Negative.  Negative for joint swelling.   Allergic/Immunologic: Negative for immunocompromised state.   Neurological: Negative.  Negative for dizziness, seizures, speech difficulty and numbness.   Psychiatric/Behavioral:  Negative for decreased concentration, hallucinations, self-injury, sleep disturbance and suicidal ideas. The patient is not nervous/anxious and is not hyperactive.          Physical Exam:  Physical Exam  Psychiatric:         Attention and Perception: Attention and perception normal.         Mood and Affect: Mood and affect normal.         Speech: Speech normal.         Behavior: Behavior normal. Behavior is cooperative.         Thought Content: Thought content normal. Thought content does not include suicidal ideation. Thought content does not include suicidal plan.         Cognition and Memory: Cognition and memory normal.         Judgment: Judgment normal.         Vital Signs:   There were no vitals taken for this visit.     Office notes from care team reviewed:  Date of Service: 11/11/24 OV with NIKUNJ Fontaine with [facility] (108/66-78-18-98%-183.2 lbs)      Lab Results:   No visits with results within 6 Month(s) from this visit.   Latest known visit with results is:   Office Visit on 02/16/2024   Component Date Value Ref Range Status    Amphetamine Screen, Urine 02/16/2024 Positive (A)  Negative Final    Barbiturates Screen, Urine 02/16/2024 Negative  Negative Final    Buprenorphine, Screen, Urine 02/16/2024 Negative  Negative Final    Benzodiazepine Screen, Urine 02/16/2024  Negative  Negative Final    Cocaine Screen, Urine 02/16/2024 Negative  Negative Final    MDMA (ECSTASY) 02/16/2024 Negative  Negative Final    Methamphetamine, Ur 02/16/2024 Negative  Negative Final    Morphine/Opiates Screen, Urine 02/16/2024 Negative  Negative Final    Methadone Screen, Urine 02/16/2024 Negative  Negative Final    Oxycodone Screen, Urine 02/16/2024 Negative  Negative Final    Phencyclidine (PCP), Urine 02/16/2024 Negative  Negative Final    THC, Screen, Urine 02/16/2024 Negative  Negative Final    Lot Number 02/16/2024 [id number]   Final    Expiration Date 02/16/2024 11-9-2025   Final       EKG Results:  No orders to display       Imaging Results:  No Images in the past 120 days found..      Assessment & Plan   Diagnoses and all orders for this visit:    1. ADHD (attention deficit hyperactivity disorder), inattentive type (Primary)    2. Generalized anxiety disorder    3. Medication management                    Visit Diagnoses:    ICD-10-CM ICD-9-CM   1. ADHD (attention deficit hyperactivity disorder), inattentive type  F90.0 314.00   2. Generalized anxiety disorder  F41.1 300.02   3. Medication management  Z79.899 V58.69                   PLAN:  Safety: No acute safety concerns  Therapy: Declines  Risk Assessment: Risk of self-harm acutely is low.  Risk factors include anxiety disorder, access to guns/weapons, and recent psychosocial stressors (pandemic). Protective factors include no family history, no present SI, no history of suicide attempts or self-harm in the past, minimal AODA, healthcare seeking, future orientation, willingness to engage in care.  Risk of self-harm chronically is also low, but could be further elevated in the event of treatment noncompliance and/or AODA.  Meds:   -Continue Wellbutrin  mg by mouth twice daily to target ADHD and mood.   -Continue Adderall IR 20 mg by mouth 3 TIMES DAILY to target attention and concentration deficit, symptoms of ADHD.  Last dispensed  8/15/24 #270/90 day supply.  Risks, benefits, side effects discussed with patient including elevated heart rate, elevated blood pressure, irritability, insomnia, sexual dysfunction, appetite suppressing properties, psychosis.  After discussion of these risks and benefits, the patient voiced understanding and agreed to proceed. Refilled today for 90 day supply to [facility].  Informed patient order would be sent to Dr. Enrique in separate entry for signature due to EMR system, and will not see as refilled on AVS today.  Instructed patient to contact provider if mail order is not received in timely manner and a short supply could be sent to local pharmacy.   -Controlled substance documentation: Ryan reviewed; prior urine drug screen consistent obtained 2/16/24; consent is up to date, signed, witnessed and in EHR, dated 2/16/24, which will be updated annually per policy. Patient is aware of risk of addiction on this medication, understands need to follow up for a review every 3 months and medications will be adjusted or decreased as deemed appropriate at each visit.  No history of drug or alcohol abuse.  No concerns about diversion or abuse. Patient denies side effects related to the medication.  Patient is aware of random urine drug screens and pill counts. The dosing of this medication will be reviewed on a regular basis and reduced if possible. Ongoing use of a controlled substance is necessary for this patient to have a normal quality of life. Next visit will be in the office for annual CSA.     Labs:  n/a    Symptoms of anxiety and ADHD are under good control with current medication regimen.  Overall mood is stable.   The plan was discussed with the patient. The patient was given time to ask questions and these questions were answered. At the conclusion of their visit they had no additional questions or concerns and all questions were answered to their satisfaction.   Patient was given instructions and  counseling regarding condition and for health maintenance advice. Please see specific information pulled into the AVS if appropriate.    Patient to contact provider if symptoms worsen or fail to improve.        8/14/25:  -Continue Wellbutrin  mg by mouth twice daily to target ADHD and mood.   -Continue Adderall IR 20 mg by mouth 3 TIMES DAILY to target attention and concentration deficit, symptoms of ADHD.  Last dispensed 5/20/24 #270/90 day supply.  Risks, benefits, side effects discussed with patient including elevated heart rate, elevated blood pressure, irritability, insomnia, sexual dysfunction, appetite suppressing properties, psychosis.  After discussion of these risks and benefits, the patient voiced understanding and agreed to proceed. Refilled today for 90 day supply to [facility].  Informed patient order would be sent to Dr. Enrique in separate entry for signature due to EMR system, and will not see as refilled on AVS today.   -Discontinued Trazodone from EHR as patient is no longer taking due to not needed.    Symptoms of anxiety and ADHD are under good control with current medication regimen.  Patient is experiencing grief over loss of dog, emotional support provided.   The plan was discussed with the patient. The patient was given time to ask questions and these questions were answered. At the conclusion of their visit they had no additional questions or concerns and all questions were answered to their satisfaction.   Patient was given instructions and counseling regarding condition and for health maintenance advice. Please see specific information pulled into the AVS if appropriate.    Patient to contact provider if symptoms worsen or fail to improve.      5/14/24:  -Continue Wellbutrin  mg by mouth twice daily to target ADHD and mood.   -Continue Adderall IR 20 mg by mouth 3 TIMES DAILY to target attention and concentration deficit, symptoms of ADHD.  Last dispensed 4/26/24 #90/30 day  supply.  Risks, benefits, side effects discussed with patient including elevated heart rate, elevated blood pressure, irritability, insomnia, sexual dysfunction, appetite suppressing properties, psychosis.  After discussion of these risks and benefits, the patient voiced understanding and agreed to proceed. Patient instructed to request refill for Adderall via MyChart when needed.  Patient to contact [facility] mail order pharmacy to determine turn around time for delivery, cost of a 90 day supply of Adderall IR, and if feasible and able to set up patient to indicate on refill request which may need to be submitted 7-10 days prior to due date initially, though patient will inform provider further after she calls to determine details. Will plan for a 90 day supply with next refill request.   Symptoms of anxiety and ADHD are under good control with current medication regimen.    The plan was discussed with the patient. The patient was given time to ask questions and these questions were answered. At the conclusion of their visit they had no additional questions or concerns and all questions were answered to their satisfaction.   Patient was given instructions and counseling regarding condition and for health maintenance advice. Please see specific information pulled into the AVS if appropriate.    Patient to contact provider if symptoms worsen or fail to improve.      2/16/24:  -Continue Wellbutrin  mg by mouth twice daily to target  ADHD and mood. NR, Patient instructed to request refills when appropriate, when down to 5-7 days remaining via  pharmacy or via MyChart.       -Continue Adderall IR 20 mg by mouth 3 TIMES DAILY to target attention and concentration deficit, symptoms of ADHD.  Last dispensed 1/27/24 #90/30 day supply. Patient instructed to request refill for Adderall via MyChart when needed.    Controlled substance documentation: Ryan reviewed; prior urine drug screen consistent obtained today  2/16/24; consent is up to date, signed, witnessed and in EHR, dated today 2/16/24, which will be updated annually per policy. Patient is aware of risk of addiction on this medication, understands need to follow up for a review every 3 months and medications will be adjusted or decreased as deemed appropriate at each visit.  No history of drug or alcohol abuse.  No concerns about diversion or abuse. Patient denies side effects related to the medication.  Patient is aware of random urine drug screens and pill counts. The dosing of this medication will be reviewed on a regular basis and reduced if possible. Ongoing use of a controlled substance is necessary for this patient to have a normal quality of life.  -Continue Trazodone 50 mg by mouth nightly as needed to target insomnia.  Managed per PCP.  -Labs: POC UDS today in clinic, results noted as positive amphetamines as expected, patient informed.      Symptoms of anxiety, insomnia, ADHD are under good control with current medication regimen.    Patient was given instructions and counseling regarding condition and for health maintenance advice. Please see specific information pulled into the AVS if appropriate.    Patient to contact provider if symptoms worsen or fail to improve.        12/18/23:   -Continue Wellbutrin  mg by mouth twice daily to target  ADHD and mood.   -Continue Adderall IR 20 mg by mouth 3 TIMES DAILY to target attention and concentration deficit, symptoms of ADHD.  Last dispensed 11/28/23 #90/30 day supply.  . Patient instructed to request refill for Adderall via [facility] when needed.  -Continue Trazodone 50 mg by mouth nightly as needed to target insomnia. Managed per PCP.    Symptoms of anxiety, depression, ADHD are under good control with current medication regimen.  Next appointment will be in office for annual CSA requirements.   Patient was given instructions and counseling regarding condition and for health maintenance advice. Please  see specific information pulled into the AVS if appropriate.    Patient to contact provider if symptoms worsen or fail to improve.        9/18/23  Continue Wellbutrin  mg by mouth  twice daily to target  ADHD and mood.     Continue Adderall IR 20 mg by mouth 3 TIMES DAILY to target attention and concentration deficit, symptoms of ADHD.  Last dispensed 8/21/23 #90/30 day supply.  Risks, benefits, side effects discussed with patient including elevated heart rate, elevated blood pressure, irritability, insomnia, sexual dysfunction, appetite suppressing properties, psychosis.  After discussion of these risks and benefits, the patient voiced understanding and agreed to proceed. Will send in a 90 day supply to [facility] Mail order pharmacy for insurance, cost, and availability.  Informed patient order would be sent to Dr. Enrique in separate entry for signature due to EMR system, and will not see as refilled on AVS today.   Symptoms of ADHD are under good control with current dose of Adderall IR, Patient to contact provider if symptoms worsen or fail to improve.        7/17/23:   -Continue Wellbutrin  mg by mouth  twice daily to target  ADHD and mood. Refilled today  -Continue Adderall IR 20 mg by mouth 3 TIMES DAILY to target attention and concentration deficit, symptoms of ADHD.  Last dispensed 6/23/23 #90/30 day supply.  Will continue to send to [facility] pharmacy due to availability of Adderall as [facility] has been running out of medication.  Patient instructed to Request refills when needed; earliest fill date for Adderall IR 20 mg 6/22/23.     Controlled substance documentation: Ryan reviewed; prior urine drug screen consistent obtained 2/16/23; consent is up to date, signed, witnessed and in EHR, dated 2/16/23, which will be updated annually per policy. Patient is aware of risk of addiction on this medication, understands need to follow up for a review every 3 months and medications will be adjusted or  decreased as deemed appropriate at each visit.  No history of drug or alcohol abuse.  No concerns about diversion or abuse. Patient denies side effects related to the medication.  Patient is aware of random urine drug screens and pill counts. The dosing of this medication will be reviewed on a regular basis and reduced if possible. Ongoing use of a controlled substance is necessary for this patient to have a normal quality of life.  Symptoms of ADHD are under good control with current medication regimen.   Patient was given instructions and counseling regarding condition and for health maintenance advice. Please see specific information pulled into the AVS if appropriate.    Patient to contact provider if symptoms worsen or fail to improve.      4/17/23:  -Continue Wellbutrin  mg by mouth  twice daily to target anxiety, attention & concentration deficit.   -Continue Adderall IR 20 mg by mouth 3 TIMES DAILY to target attention and concentration deficit, symptoms of ADHD.  Last dispensed 3/27/23 #90/30 day supply.  Risks, benefits, side effects discussed with patient including elevated heart rate, elevated blood pressure, irritability, insomnia, sexual dysfunction, appetite suppressing properties, psychosis.  After discussion of these risks and benefits, the patient voiced understanding and agreed to proceed.  Patient instructed to contact pharmacy when refill needed as patient is not due for refill today.  Will continue to send to [facility] pharmacy due to availability of Adderall as [facility] has been running out of medication.  Patient instructed to Request refills when needed; earliest fill date for Adderall IR 20 mg 3/25/23, request no later than 4/24/23.   Symptoms of ADHD are under good control with current dose of Adderall IR. Tolerating well, without reported side effects. Patient to contact provider if symptoms worsen or fail to improve.       2/16/23:    Continue Wellbutrin  mg by mouth twice daily to  target anxiety, attention & concentration deficit.     Continue Adderall IR 20 mg by mouth 3 TIMES DAILY to target attention and concentration deficit, symptoms of ADHD.  Last dispensed 1/24/23 #90/30 day supply.  Risks, benefits, side effects discussed with patient including elevated heart rate, elevated blood pressure, irritability, insomnia, sexual dysfunction, appetite suppressing properties, psychosis.  After discussion of these risks and benefits, the patient voiced understanding and agreed to proceed.  Patient instructed to contact pharmacy when refill needed as patient is not due for refill today.  Will continue to send to [facility] pharmacy due to availability of Adderall as [facility] has been running out of medication.   Controlled substance documentation: Ryan reviewed; prior urine drug screen consistent obtained 2/15/22, UDS today as expected 2/16/23; consent is up to date, signed, witnessed and in EHR, dated 2/16/23, which will be updated annually per policy. Patient is aware of risk of addiction on this medication, understands need to follow up for a review every 3 months and medications will be adjusted or decreased as deemed appropriate at each visit.  No history of drug or alcohol abuse.  No concerns about diversion or abuse. Patient denies side effects related to the medication.  Patient is aware of random urine drug screens and pill counts. The dosing of this medication will be reviewed on a regular basis and reduced if possible..  Ongoing use of a controlled substance is necessary for this patient to have a normal quality of life.  POC UDS today in clinic, results positive for amphetamines as expected.     Patient encouraged to check prescription bottle description of tablet, as some pharmacies have been receiving Adderall IR from various manufactures, though the 2 should not be mixed in one bottle per prior discussions with pharmacy stafff.     Symptoms of ADHD are under good control with current dose  of Adderall IR. Tolerating well, without reported side effects. Patient to contact provider if symptoms worsen or fail to improve.         1/11/23:   Continue Wellbutrin  mg by mouth  twice daily to target anxiety, attention & concentration deficit.     INCREASE Adderall IR FROM 20 mg by mouth 2 times daily (7am&6pm) AND one half tablet to equal 10 mg at midday at 12-1 pm TO 20 mg by mouth 3 TIMES DAILY to target attention and concentration deficit.  Last dispensed 12/28/22 #75/30 day supply.    Risks, benefits, side effects discussed with patient including elevated heart rate, elevated blood pressure, irritability, insomnia, sexual dysfunction, appetite suppressing properties, psychosis.  After discussion of these risks and benefits, the patient voiced understanding and agreed to proceed.  Patient instructed to contact MA directly in office when refill needed as patient is not due for refill today and has been instructed to start taking 3 of the 20 mg daily and will deplete current supply before 1/28/23.  Updated order in EHR, ordered as a NO PRINT.  Will continue to send to [facility] pharmacy due to availability of Adderall as [facility] has been running out of medication.     Patient to be seen in office for next visit to sign annual CSA and provide UDS.    Symptoms of ADHD are under fair control with current Adderall regimen, will increase afternoon/midday dose today, patient instructed to contact provider if unable to tolerate, denies side effects.  Morning and Evening doses of Adderall IR 20 mg have been effective thus far in management of ADHD symptoms.  Coordinated care with patient for an in office visit next month, patient instructed to arrive at [facility] at 1245 2/16/23 and provide urine sample and for in office visit, so patient can leave in timely manner to arrive to employer in [place]. Informed office staff of scheduling and no need to contact patient to set up appointment. Patient to contact  "provider if symptoms worsen or fail to improve.       12/27/22:  TELEPHONE  Patient called asking if she can be changed to something other than Adderall (\"since it is currently unavailable, or hard to find?\").  Please advise  Please advise her to contact various pharmacies to determine availability, such as [facility], [facility], [facility] are a few I can recall that have had medication in supply. She will have to be seen before switching medications, and I wouldn't advise switching as she has done well with Adderall and it is available, though some pharmacies are not getting at routine intervals.  But she will need to call around and see which pharmacies are able to fill medication.  It looks like [facility] has filled Adderall every month.   Called and advised patient of Mallorie's response.  Patient says she will call around and see where she can find it and call me back and let me know.  Patient called around and would like for you to [facility] and [facility]end to [facility] [address] [facility]she says they have it in stock.  Please resend to the [facility].  Dr. Enrique is out this week, so I will send request to NIKUNJ Reyes, last dispensed 11/28/22, patient did not  recent order from 12/16/22.     12/6/22:  Declines therapy  Continue Wellbutrin  mg by mouth  twice daily to target anxiety, attention & concentration deficit.     Continue Adderall IR 20 mg by mouth 2 times daily (7am&6pm) AND one half tablet to equal 10 mg at midday at 12-1 pm to target attention and concentration deficit.  Last dispensed 11/28/22 #60/30 day supply.  Patient had requested short supply on 11/28/22, however, order was sent as previous prescription for twice daily dosing.  10/21/22 #75/30 days with changes. Risks, benefits, side effects discussed with patient including elevated heart rate, elevated blood pressure, irritability, insomnia, sexual dysfunction, appetite suppressing properties, psychosis.  After discussion of these risks " and benefits, the patient voiced understanding and agreed to proceed.  Patient instructed to notify provider at office no later than Thurs 12/15/22 so correct prescription is ordered.  Tolerating new dosing well with reported effectiveness.     Controlled substance documentation: Ryan reviewed; prior urine drug screen consistent obtained 2/15/22; consent is up to date, signed, witnessed and in EHR, dated 2/15/22, which will be updated annually per policy. Patient is aware of risk of addiction on this medication, understands need to follow up for a review every 3 months and medications will be adjusted or decreased as deemed appropriate at each visit.  No history of drug or alcohol abuse.  No concerns about diversion or abuse. Patient denies side effects related to the medication.  Patient is aware of random urine drug screens and pill counts. The dosing of this medication will be reviewed on a regular basis and reduced if possible..  Ongoing use of a controlled substance is necessary for this patient to have a normal quality of life.  Symptoms of anxiety and sleep disturbance appear to be chronic and unrelated to medications, as patient is working 60 hrs/week and going to school full time.  Symptoms of anxiety, ADHD are under good control with Wellbutrin and Adderall.  Patient to contact provider if symptoms worsen or fail to improve.     10/21/22:   Continue Wellbutrin  mg by mouth  twice daily to target anxiety, attention & concentration deficit.     Continue Adderall IR 20 mg by mouth twice daily to target attention and concentration deficit.  Last dispensed 9/16/22 #60/30 day supply. Instructed to continue 7 am dose and take 2nd dose later at 6 pm.   ADD Adderall IR 10 mg by mouth daily at 1 pm to target attention and concentration deficit. Risks, benefits, side effects discussed with patient including elevated heart rate, elevated blood pressure, irritability, insomnia, sexual dysfunction, appetite  suppressing properties, psychosis.  After discussion of these risks and benefits, the patient voiced understanding and agreed to proceed.   Informed patient order would be sent to Dr. Enrique in separate entry for signature due to EMR system, and will not see as refilled on [facility] today.  Controlled substance documentation: Ryan reviewed; prior urine drug screen consistent obtained 2/15/22; consent is up to date, signed, witnessed and in EHR, dated 2/15/22, which will be updated annually per policy. Patient is aware of risk of addiction on this medication, understands need to follow up for a review every 3 months and medications will be adjusted or decreased as deemed appropriate at each visit.  No history of drug or alcohol abuse.  No concerns about diversion or abuse. Patient denies side effects related to the medication.  Patient is aware of random urine drug screens and pill counts. The dosing of this medication will be reviewed on a regular basis and reduced if possible..  Ongoing use of a controlled substance is necessary for this patient to have a normal quality of life.  Collaborated with  regarding adding a 3rd dose of Adderall IR as patient day starts at 7 am and ends at 12 am with work and school schedule.  Will add 10 mg to take midday at 12-1 pm.        9/16/22:   Declines therapy  Continue Wellbutrin  mg by mouth  twice daily to target anxiety, attention & concentration deficit.   Continue Adderall IR 20 mg by mouth twice daily to target attention and concentration deficit.  Last dispensed 8/17/22 for 30 day supply #60 per   Will send order to  today. Will inquire of 3 month supply with , however, explained to patient a 90 day supply would not be ordered until stabilized on a maintanence dose, patient verbalized understanding.   Tolerating medications well, denies side effects, attention and concentration has improved with increase dose.    Controlled substance  documentation: Ryan reviewed; prior urine drug screen consistent obtained 2/15/22; consent is up to date, signed, witnessed and in EHR, dated 2/15/22, which will be updated annually per policy. Patient is aware of risk of addiction on this medication, understands need to follow up for a review every 3 months and medications will be adjusted or decreased as deemed appropriate at each visit.  No history of drug or alcohol abuse.  No concerns about diversion or abuse. Patient denies side effects related to the medication.  Patient is aware of random urine drug screens and pill counts. The dosing of this medication will be reviewed on a regular basis and reduced if possible..  Ongoing use of a controlled substance is necessary for this patient to have a normal quality of life.    8/15/22:   Continue Wellbutrin  mg by mouth  twice daily to target anxiety, attention & concentration deficit. Refilled today     Increase Adderall IR from 15 mg to 20 mg by mouth twice daily to target attention and concentration deficit.  Last dispensed 7/19/22 for 30 day supply #60 per   Will send order to  today and allow first fill 8/17/22.     Will increase Adderall IR to 20 mg twice daily as patient has been experiencing inability to complete tasks as she is shifting from one uncompleted tasks to another in between morning and mid afternoon doses of 15 mg IR.       7/11/22:   Continue Wellbutrin  mg by mouth  twice daily to target anxiety, attention & concentration deficit.      Continue Adderall IR 15 mg by mouth twice daily to target attention and concentration deficit.  Last dispensed 6/18/22 for 30 day supply #60 per   Patient doing well with current medication regimen will continue current medications and send update to  for refill of Adderall IR. Will see patient back in 5 weeks    6/13/22:   Patient tolerating Adderall 15 mg IR twice daily without difficulty, symptoms are managed well  currently. Will send message to  informing of toleration and refill request, patient was informed medication would not be available for refill until closer to 6/19/22, will have patient return in 4-5 weeks closer to refill date.   Continue Wellbutrin  mg by mouth  twice daily to target anxiety, attention & concentration deficit.    Continue Adderall IR 15 mg by mouth twice daily to target attention and concentration deficit.  Last dispensed 5/19/22 for 30 day supply #60 per     5/11/22:   Continue Wellbutrin  mg by mouth  twice daily to target anxiety, attention & concentration deficit.      Will stop Adderall XR 15 mg due to hot flash sensation and ineffectiveness.   Will plan on changing Adderall IR 10 mg by mouth every afternoon to Adderall IR 15 mg by mouth twice daily to target attention and concentration deficit.  Last IR dispensed 4/18/22, will send message to Dr. Enrique regarding recommendations and symptoms, patient informed medication would not be available for  until 5/18/22, patient verbalized understanding.       4/13/22:  Continue Wellbutrin  mg by mouth  twice daily to target anxiety, attention & concentration deficit.      Start Adderall XR 15 mg by mouth daily every morning and continue Adderall IR 10 mg by mouth every afternoon to target attention and concentration deficit.  Denies side effects of medication. Message sent to Dr. Enrique regarding an update of patient symptoms and plan.     3/15/22:   Increase Wellbutrin  mg by mouth daily to twice daily as patient reports at times taking twice daily to target anxiety, attention & concentration deficit.      Increase Adderall IR 10 mg by mouth from once daily to twice daily to target attention and concentration deficit, take one in the morning and the 2nd dose in afternoon approximately 6 hr apart.    Denies side effects of medication. Message sent to Dr. Enrique regarding an update of patient symptoms and  plan.     2/15/22:   -POC UDS  -CSA signed and reviewed with patient   -Continue Wellbutrin  mg by mouth daily to target anxiety, attention & concentration deficit.    -Adderall IR 5 mg by mouth twice daily to target attention and concentration deficit, take one in the morning and the 2nd dose in afternoon approximately 6 hr apart.  Risks, benefits, side effects discussed with patient including elevated heart rate, elevated blood pressure, irritability, insomnia, sexual dysfunction, appetite suppressing properties, psychosis.  After discussion of these risks and benefits, the patient voiced understanding and agreed to proceed. UDS ordered, Jovita reviewed. Informed patient medication would not be ordered until UDS results received, and would be ordered per Dr. Enrique.   Adderall IR 10 mg by mouth daily was ordered per  on 2/15/22      11/30/22:   Change Wellbutrin  mg twice daily to Wellbutrin  mg by mouth daily in the am to target anxiety, attention & concentration deficit.    ADHD testing referral to Al Cole with [facility] in [place], [place] as patient prefers to see provider closer to home.  List given of providers (4) for testing, patient to contact to schedule appointment.    Patient screened positive for depression based on a PHQ-9 score of 4 on 2/16/24. Follow-up recommendations include: Prescribed antidepressant medication treatment and Suicide Risk Assessment performed. (PHQ2-0)       TREATMENT PLAN/GOALS: Continue supportive psychotherapy efforts and medications as indicated. Treatment and medication options discussed during today's visit. Patient acknowledged and verbally consented to continue with current treatment plan and was educated on the importance of compliance with treatment and follow-up appointments.    MEDICATION ISSUES:  JOVITA reviewed as expected.  Discussed medication options and treatment plan of prescribed medication as well as the risks,  benefits, and side effects including potential falls, possible impaired driving and metabolic adversities among others. Patient is agreeable to call the office with any worsening of symptoms or onset of side effects. Patient is agreeable to call 911 or go to the nearest ER should he/she begin having SI/HI. No medication side effects or related complaints today.     MEDS ORDERED DURING VISIT:  No orders of the defined types were placed in this encounter.      Return in about 3 months (around 2/13/2025) for medication check.          I spent 32 minutes caring for Vickie on this date of service. This time includes time spent by me in the following activities: preparing for the visit, obtaining and/or reviewing a separately obtained history, performing a medically appropriate examination and/or evaluation, counseling and educating the patient/family/caregiver, ordering medications, tests, or procedures, referring and communicating with other health care professionals, documenting information in the medical record, care coordination, and scheduling   (Ordering of medications will be seen in a separate encounter due to EHR for Adderall)       This document has been electronically signed by NIKUNJ Neri  November 13, 2024 09:05 EST      Part of this note may be an electronic transcription/translation of spoken language to printed text using the Dragon Dictation System.

## 2024-11-13 NOTE — PATIENT INSTRUCTIONS
"SPECIFIC RECOMMENDATIONS:     1.      Medications discussed at this encounter:                   -  Refill for Adderall sent to Dr. Enrique for signature and will not be seen on after visit summary today.      2.      Psychotherapy recommendations: Declined     3.     Return to clinic: 3 months, Monday 2/10/25 at 1120 in office    Please arrive at least 15 minutes before your scheduled appointment time to complete check in process.      IF you are scheduled for a [facility] VIDEO visit, YOU MUST COMPLETE THE \"E-CHECK IN\" PROCESS PRIOR TO BEGINNING THE VISIT, You may still complete the E-Check in for in office visits prior to appointment, you will receive multiple text/email reminders which will direct you further if needed.            "

## 2024-11-18 DIAGNOSIS — F90.0 ADHD (ATTENTION DEFICIT HYPERACTIVITY DISORDER), INATTENTIVE TYPE: ICD-10-CM

## 2024-11-19 RX ORDER — DEXTROAMPHETAMINE SACCHARATE, AMPHETAMINE ASPARTATE, DEXTROAMPHETAMINE SULFATE AND AMPHETAMINE SULFATE 5; 5; 5; 5 MG/1; MG/1; MG/1; MG/1
20 TABLET ORAL 3 TIMES DAILY
Qty: 270 TABLET | Refills: 0 | Status: SHIPPED | OUTPATIENT
Start: 2024-11-19 | End: 2025-02-17

## 2025-02-10 ENCOUNTER — CLINICAL SUPPORT (OUTPATIENT)
Dept: FAMILY MEDICINE CLINIC | Age: 55
End: 2025-02-10
Payer: COMMERCIAL

## 2025-02-10 ENCOUNTER — OFFICE VISIT (OUTPATIENT)
Dept: BEHAVIORAL HEALTH | Facility: CLINIC | Age: 55
End: 2025-02-10
Payer: COMMERCIAL

## 2025-02-10 VITALS
HEART RATE: 83 BPM | SYSTOLIC BLOOD PRESSURE: 130 MMHG | HEIGHT: 66 IN | WEIGHT: 185.2 LBS | BODY MASS INDEX: 29.77 KG/M2 | DIASTOLIC BLOOD PRESSURE: 84 MMHG

## 2025-02-10 DIAGNOSIS — F41.1 GENERALIZED ANXIETY DISORDER: ICD-10-CM

## 2025-02-10 DIAGNOSIS — Z79.899 ENCOUNTER FOR MONITORING STIMULANT THERAPY: ICD-10-CM

## 2025-02-10 DIAGNOSIS — Z51.81 ENCOUNTER FOR MONITORING STIMULANT THERAPY: ICD-10-CM

## 2025-02-10 DIAGNOSIS — Z79.899 MEDICATION MANAGEMENT: ICD-10-CM

## 2025-02-10 DIAGNOSIS — Z79.899 HIGH RISK MEDICATION USE: ICD-10-CM

## 2025-02-10 DIAGNOSIS — F90.0 ADHD (ATTENTION DEFICIT HYPERACTIVITY DISORDER), INATTENTIVE TYPE: Primary | ICD-10-CM

## 2025-02-10 DIAGNOSIS — Z79.899 CONTROLLED SUBSTANCE AGREEMENT SIGNED: ICD-10-CM

## 2025-02-10 LAB
AMPHET+METHAMPHET UR QL: NEGATIVE
AMPHETAMINES UR QL: NEGATIVE
BARBITURATES UR QL SCN: NEGATIVE
BENZODIAZ UR QL SCN: NEGATIVE
BUPRENORPHINE SERPL-MCNC: NEGATIVE NG/ML
CANNABINOIDS SERPL QL: NEGATIVE
COCAINE UR QL: NEGATIVE
EXPIRATION DATE: NORMAL
Lab: NORMAL
MDMA UR QL SCN: NEGATIVE
METHADONE UR QL SCN: NEGATIVE
MORPHINE/OPIATES SCREEN, URINE: NEGATIVE
OXYCODONE UR QL SCN: NEGATIVE
PCP UR QL SCN: NEGATIVE

## 2025-02-10 PROCEDURE — G0480 DRUG TEST DEF 1-7 CLASSES: HCPCS | Performed by: NURSE PRACTITIONER

## 2025-02-10 RX ORDER — BUPROPION HYDROCHLORIDE 150 MG/1
150 TABLET, EXTENDED RELEASE ORAL 2 TIMES DAILY
Qty: 180 TABLET | Refills: 3 | Status: SHIPPED | OUTPATIENT
Start: 2025-02-10

## 2025-02-10 RX ORDER — DEXTROAMPHETAMINE SACCHARATE, AMPHETAMINE ASPARTATE, DEXTROAMPHETAMINE SULFATE AND AMPHETAMINE SULFATE 5; 5; 5; 5 MG/1; MG/1; MG/1; MG/1
1 TABLET ORAL 2 TIMES DAILY
COMMUNITY
End: 2025-02-10

## 2025-02-10 RX ORDER — BUPROPION HYDROCHLORIDE 150 MG/1
TABLET ORAL
COMMUNITY
End: 2025-02-10

## 2025-02-10 RX ORDER — DOXYCYCLINE HYCLATE 100 MG
1 TABLET ORAL EVERY 12 HOURS SCHEDULED
COMMUNITY
Start: 2024-11-11 | End: 2025-02-10 | Stop reason: ALTCHOICE

## 2025-02-10 RX ORDER — ESTRADIOL 0.07 MG/D
FILM, EXTENDED RELEASE TRANSDERMAL
COMMUNITY

## 2025-02-10 NOTE — PATIENT INSTRUCTIONS
"Should you want to get in touch with your provider, NIKUNJ Neri, please contact MY Medical Assistant, Manjula, directly at [phone].  Recommend saving Manjula's direct number in phone as this is the PREFERRED & EASIEST way to get in contact with your provider.  Please leave a voice mail if you do not get an answer and she will return your call within 24 hrs. You will NOT be able to contact provider on [facility], as Behavioral Health Providers are restricted. YOU MUST CALL [phone]  If you need to speak with the on call provider after hours or on weekends, please Contact the [facility] ([phone]) and staff will be able to page the provider on call directly.     SPECIFIC RECOMMENDATIONS:     1.      Medications discussed at this encounter:                   -  Refilled Wellbutrin  mg to take twice daily for 90 days with 3 refills to mail order pharmacy.     2.      Psychotherapy recommendations Declined     3.     Return to clinic: 3 months Friday 5/9/25 at 8am via video    Please arrive at least 15 minutes before your scheduled appointment time to complete check in process.      IF you are scheduled for a [facility] VIDEO visit, YOU MUST COMPLETE THE \"E-CHECK IN\" PROCESS PRIOR TO BEGINNING THE VISIT, You may still complete the E-Check in for in office visits prior to appointment, you will receive multiple text/email reminders which will direct you further if needed.            "

## 2025-02-10 NOTE — PROGRESS NOTES
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 being worried though did not allow concern to further bother after a short duration.  \"As an adult, I can't sleep over something I can't control, it will eat me alive\".  Another person's behavior, believed reaction was normal as father was same way.  When asked employer to step down in position, patient with excessive worry for at least 3 days.   Typical day described as: Upon awakening in the am, Drinks coffee, sits down with computer on couch and starts school tasks.  After few minutes patient is up doing other tasks, then returns to computer for approximately 15 minutes, then is up again as patient is easily distracted by thoughts, other tasks, etc.  \"Then I go to work, I run a muck\", as patient is in constant moving, \"it's chaos, but my brain feels like it likes that, I function better that way, but there are things that get lost, such as text messages, and I realize I haven't responded.  If I am not busy I become bored\", now that patient lives alone with dogs and school, finds that mind is wandering all the time.  Has never liked school growing up, had very low GPA throughout school, C average. Now that started college in May 2021, has a 4.0 due to forcing self, has received first B, admit to not liking to fail.   Assignments are due Sundays and Thursday. And completes one assignment on Monday that is due Thurs.  Yesterday up at 7 am, had read assignment several times over " the past weekend, discussion board posting, knew what was needed, it took until 11 am to complete as patient becomes easily distracted.  Explained assignment as fairly easy and should have been done in a shorter duration.  Carries a notebook at work, at end of night has to go through every email that was already read, review notebook, text messages to ensure everything has been addressed.  Frequent small tasks get lost.  Still misses things even when written down.   [place] at age 17 to go into one room and to not leave room until clean, when clothes in washer they go to dryer then folding and put away.  More scattered house work now, admits to not liking to vacuum as vacuum sits out for a week.  Grew up in spot free home which carried over into adulthood.     Chart review completed prior to visit.   See flow sheet for ADHD self report scale symptom checklist, which will be scanned into chart.      PHQ-9 Depression Screening  PHQ-9 Total Score:   2/10/2025 6 (PHQ2-0)  Little interest or pleasure in doing things? Not at all   Feeling down, depressed, or hopeless? Not at all   PHQ-2 Total Score 0   Trouble falling or staying asleep, or sleeping too much? Over half   Feeling tired or having little energy? Over half   Poor appetite or overeating? Over half   Feeling bad about yourself - or that you are a failure or have let yourself or your family down? Not at all   Trouble concentrating on things, such as reading the newspaper or watching television? Not at all   Moving or speaking so slowly that other people could have noticed? Or the opposite - being so fidgety or restless that you have been moving around a lot more than usual? Not at all   Thoughts that you would be better off dead, or of hurting yourself in some way? Not at all   PHQ-9 Total Score 6   If you checked off any problems, how difficult have these problems made it for you to do your work, take care of things at home, or get along with other people? Not  difficult at all         MARCIANO-7  Feeling nervous, anxious or on edge: Not at all  Not being able to stop or control worrying: Not at all  Worrying too much about different things: Not at all  Trouble Relaxing: Not at all  Being so restless that it is hard to sit still: Not at all  Feeling afraid as if something awful might happen: Not at all  Becoming easily annoyed or irritable: Several days  MARCIANO 7 Total Score: 1  If you checked any problems, how difficult have these problems made it for you to do your work, take care of things at home, or get along with other people: Not difficult at all 2/10/2025     The following portions of the patient's history were reviewed and updated as appropriate: allergies, current medications, past family history, past medical history, past social history, and past surgical history.     Past Surgical History:  History reviewed. No pertinent surgical history.    Problem List:  Patient Active Problem List   Diagnosis    Anxiety    Essential hypertension    IBS (irritable bowel syndrome)       Allergy:   No Known Allergies     Discontinued Medications:  Medications Discontinued During This Encounter   Medication Reason    buPROPion XL (WELLBUTRIN XL) 150 MG 24 hr tablet Other- See Medication Note    amphetamine-dextroamphetamine (ADDERALL) 20 MG tablet Historical Med - Therapy completed    estradiol (VIVELLE-DOT) 0.025 MG/24HR patch Discontinued by another clinician    doxycycline (VIBRAMYICN) 100 MG tablet Discontinued by another clinician    buPROPion SR (WELLBUTRIN SR) 150 MG 12 hr tablet Reorder                   Current Medications:   Current Outpatient Medications   Medication Sig Dispense Refill    amphetamine-dextroamphetamine (Adderall) 20 MG tablet Take 1 tablet by mouth 3 (Three) Times a Day for 90 days. Indications: Attention Deficit Hyperactivity Disorder 270 tablet 0    Ascorbic Acid (VITAMIN C PO) Take  by mouth.      buPROPion SR (WELLBUTRIN SR) 150 MG 12 hr tablet Take 1  tablet by mouth 2 (Two) Times a Day. Indications: Attention Deficit Hyperactivity Disorder, Major Depressive Disorder 180 tablet 3    clobetasol propionate (TEMOVATE) 0.05 % cream 1 Application Every 12 (Twelve) Hours.      cyclobenzaprine (FLEXERIL) 5 MG tablet Daily.      diclofenac (VOLTAREN) 75 MG EC tablet TAKE 1 TABLET 2 TIMES DAILYAS NEEDED FOR (ARTHRITIS   PAIN) 180 tablet 0    doxycycline (VIBRAMYCIN) 100 MG capsule Take 1 capsule by mouth 2 (Two) Times a Day As Needed (outbreaks). 30 capsule 1    estradiol (MINIVELLE, VIVELLE-DOT) 0.075 MG/24HR patch APPLY 1 PATCH TO SKIN 2    TIMES A WEEK      fluconazole (DIFLUCAN) 150 MG tablet       lisinopril (PRINIVIL,ZESTRIL) 20 MG tablet Take 1 tablet by mouth Daily. 90 tablet 3    metFORMIN ER (GLUCOPHAGE-XR) 500 MG 24 hr tablet       multivitamin with minerals tablet tablet Take 1 tablet by mouth Daily.      Zinc 100 MG tablet Take  by mouth Daily.      Progesterone (PROMETRIUM) 100 MG capsule  (Patient not taking: Reported on 2/10/2025)       No current facility-administered medications for this visit.       Past Medical History:  Past Medical History:   Diagnosis Date    Anxiety        Past Psychiatric History:  Began Treatment: 20  Diagnoses:Anxiety and concentration deficit  Psychiatrist:Denies  Therapist:Denies  Admission History:Denies  Medication Trials: Zoloft [id number] during father illness, very high stress- worked well on same low dose for 10 yrs,last [id number]; another med like Ativan prn uncertain of name though made pt cry often, then switched to Ativan prn  Adderall XR 3 weeks increased hot flashes, not effective; Trazodone 50 mg nightly effective, stopped 2024 no longer needed.  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\"  Kids:Yes or If so, how many 1 son  House:Lives in a house alone in trail in [place], [place], significant other lives in cabin on property   History: Denies  Access to Guns:  Yes, unlocked    Social History     Socioeconomic History    Marital status: Significant Other    Number of children: 1   Tobacco Use    Smoking status: Every Day     Current packs/day: 0.50     Average packs/day: 0.5 packs/day for 39.1 years (19.6 ttl pk-yrs)     Types: Cigarettes     Start date: 1986    Smokeless tobacco: Never   Vaping Use    Vaping status: Some Days    Substances: Nicotine, Flavoring    Devices: Disposable, Pre-filled or refillable cartridge, Pre-filled pod   Substance and Sexual Activity    Alcohol use: Yes     Comment: occassionally    Drug use: Never    Sexual activity: Defer       Family History:   Suicide Attempts: Denies  Suicide Completions:Denies      Family History   Problem Relation Age of Onset    ADD / ADHD Son        Developmental History:   Born: [place]  Siblings:2 brother, 1 sister  Childhood:  Step mother- physical, mental, verbal for approx 5 yrs -began in Middle school, stopped in  when moved out of house at age 15  High School:Completed  College: Currently enrolled online school for psychology 4 yr degree started May 2021- promotion required to have degree for employer    Mental Status Exam:   Hygiene:   good  Cooperation:  Cooperative  Eye Contact:  Good  Psychomotor Behavior:  Appropriate  Affect:  Appropriate  Mood: euthymic   Speech:  Normal  Thought Process:  Goal directed  Thought Content:  Normal  Suicidal:  None  Homicidal:  None  Hallucinations:  None  Delusion:  None  Memory:  Intact  Orientation:  Person, Place, Time and Situation  Reliability:  good  Insight:  Good  Judgement:  Good  Impulse Control:  Good  Physical/Medical Issues:  Yes HTN, IBS      Review of Systems:  Review of Systems " "  Constitutional:  Positive for fatigue. Negative for chills, diaphoresis and fever.   HENT:  Positive for congestion. Negative for sinus pressure, sore throat and trouble swallowing.    Respiratory:  Negative for cough and shortness of breath.    Cardiovascular:  Negative for chest pain, palpitations and leg swelling.   Gastrointestinal:  Negative for abdominal pain, diarrhea, nausea and vomiting.   Endocrine: Negative for cold intolerance and heat intolerance.   Genitourinary:  Negative for difficulty urinating and dysuria.   Musculoskeletal: Negative.  Negative for joint swelling, myalgias and neck pain.   Skin:  Negative for rash.   Allergic/Immunologic: Negative for immunocompromised state.   Neurological: Negative.  Negative for dizziness, seizures, speech difficulty, weakness, numbness and headaches.   Psychiatric/Behavioral:  Negative for decreased concentration, hallucinations, self-injury, sleep disturbance and suicidal ideas. The patient is not nervous/anxious and is not hyperactive.          Physical Exam:  Physical Exam  Psychiatric:         Attention and Perception: Attention and perception normal.         Mood and Affect: Mood and affect normal.         Speech: Speech normal.         Behavior: Behavior normal. Behavior is cooperative.         Thought Content: Thought content normal. Thought content does not include suicidal ideation. Thought content does not include suicidal plan.         Cognition and Memory: Cognition and memory normal.         Judgment: Judgment normal.         Vital Signs:   /84   Pulse 83   Ht 167.6 cm (65.98\")   Wt 84 kg (185 lb 3.2 oz)   BMI 29.91 kg/m²      Office notes from care team reviewed:  Date of Service: 1/29/25 [facility]       Lab Results: REVIEWED  Office Visit on 02/10/2025   Component Date Value Ref Range Status    Amphetamine Screen, Urine 02/10/2025 Negative  Negative Final    Barbiturates Screen, Urine " 02/10/2025 Negative  Negative Final    Buprenorphine, Screen, Urine 02/10/2025 Negative  Negative Final    Benzodiazepine Screen, Urine 02/10/2025 Negative  Negative Final    Cocaine Screen, Urine 02/10/2025 Negative  Negative Final    MDMA (ECSTASY) 02/10/2025 Negative  Negative Final    Methamphetamine, Ur 02/10/2025 Negative  Negative Final    Morphine/Opiates Screen, Urine 02/10/2025 Negative  Negative Final    Methadone Screen, Urine 02/10/2025 Negative  Negative Final    Oxycodone Screen, Urine 02/10/2025 Negative  Negative Final    Phencyclidine (PCP), Urine 02/10/2025 Negative  Negative Final    THC, Screen, Urine 02/10/2025 Negative  Negative Final    Lot Number 02/10/2025 [id number]   Final    Expiration Date 02/10/2025 7-4-26   Final       EKG Results:  No orders to display       Imaging Results:  No Images in the past 120 days found..      Assessment & Plan   Diagnoses and all orders for this visit:    1. ADHD (attention deficit hyperactivity disorder), inattentive type (Primary)  -     buPROPion SR (WELLBUTRIN SR) 150 MG 12 hr tablet; Take 1 tablet by mouth 2 (Two) Times a Day. Indications: Attention Deficit Hyperactivity Disorder, Major Depressive Disorder  Dispense: 180 tablet; Refill: 3    2. Generalized anxiety disorder    3. High risk medication use  -     POC Medline 12 Panel Urine Drug Screen  -     Amphetamine Urine Confirmation - Urine, Clean Catch; Future  -     Amphetamine Urine Confirmation - Urine, Clean Catch    4. Controlled substance agreement signed    5. Encounter for monitoring stimulant therapy  -     POC Medline 12 Panel Urine Drug Screen  -     Amphetamine Urine Confirmation - Urine, Clean Catch; Future  -     Amphetamine Urine Confirmation - Urine, Clean Catch    6. Medication management        Visit Diagnoses:    ICD-10-CM ICD-9-CM   1. ADHD (attention deficit hyperactivity disorder), inattentive type  F90.0 314.00   2. Generalized anxiety disorder  F41.1 300.02   3. High risk  medication use  Z79.899 V58.69   4. Controlled substance agreement signed  Z79.899 V58.69   5. Encounter for monitoring stimulant therapy  Z51.81 V58.83    Z79.899 V58.69   6. Medication management  Z79.899 V58.69         PLAN:  Safety: No acute safety concerns  Therapy: Declines  Risk Assessment: Risk of self-harm acutely is low.  Risk factors include anxiety disorder, access to guns/weapons, and recent psychosocial stressors (pandemic). Protective factors include no family history, no present SI, no history of suicide attempts or self-harm in the past, minimal AODA, healthcare seeking, future orientation, willingness to engage in care.  Risk of self-harm chronically is also low, but could be further elevated in the event of treatment noncompliance and/or AODA.  Meds:   -Continue Wellbutrin  mg by mouth twice daily to target ADHD and mood. Refilled today for 90 days with 3 refills to [facility] with note to pharmacy informing all psychotropic medications are to come from this provider and to cancel previously prescribed XL formulation from PCP recently ordered.     -Continue Adderall IR 20 mg by mouth 3 TIMES DAILY to target attention and concentration deficit, symptoms of ADHD.  Last dispensed 11/19/24 #270/90 day supply.  Risks, benefits, side effects discussed with patient including elevated heart rate, elevated blood pressure, irritability, insomnia, sexual dysfunction, appetite suppressing properties, psychosis.  After discussion of these risks and benefits, the patient voiced understanding and agreed to proceed. Informed patient a request for medication can be requested as early as Friday 2/14/25 to allow time for mail delivery.     -Controlled substance documentation: Ryan reviewed; prior urine drug screen consistent obtained 2/16/24 AND TODAY 2/10/25 which was negative, will be sent for confirmation; consent is up to date, signed, witnessed and in EHR, dated TODAY 2/10/25, which will be updated  annually per policy. Patient is aware of risk of addiction on this medication, understands need to follow up for a review every 3 months and medications will be adjusted or decreased as deemed appropriate at each visit.  No history of drug or alcohol abuse.  No concerns about diversion or abuse. Patient denies side effects related to the medication.  Patient is aware of random urine drug screens and pill counts. The dosing of this medication will be reviewed on a regular basis and reduced if possible. Ongoing use of a controlled substance is necessary for this patient to have a normal quality of life.   Labs:  POC UDS today in clinic, results negative not as expected, sent for amphetamine confirmation, last reported dose taken this morning, and took 1 dose yesterday and this past Saturday due to illness.  Patient instructed to request refills when appropriate, when down to 5-7 days remaining via pharmacy or via [employer].       Symptoms of anxiety and ADHD are under good control with current medication regimen.    The plan was discussed with the patient. The patient was given time to ask questions and these questions were answered. At the conclusion of their visit they had no additional questions or concerns and all questions were answered to their satisfaction.   Patient was given instructions and counseling regarding condition and for health maintenance advice. Please see specific information pulled into the AVS if appropriate.    Patient to contact provider if symptoms worsen or fail to improve.        11/13/24:   -Continue Wellbutrin  mg by mouth twice daily to target ADHD and mood.   -Continue Adderall IR 20 mg by mouth 3 TIMES DAILY to target attention and concentration deficit, symptoms of ADHD.  Last dispensed 8/15/24 #270/90 day supply.  Risks, benefits, side effects discussed with patient including elevated heart rate, elevated blood pressure, irritability, insomnia, sexual dysfunction, appetite  suppressing properties, psychosis.  After discussion of these risks and benefits, the patient voiced understanding and agreed to proceed. Refilled today for 90 day supply to [facility].  Informed patient order would be sent to Dr. Enrique in separate entry for signature due to EMR system, and will not see as refilled on AVS today.  Instructed patient to contact provider if mail order is not received in timely manner and a short supply could be sent to local pharmacy.  Next visit will be in the office for annual CSA.     Symptoms of anxiety and ADHD are under good control with current medication regimen.  Overall mood is stable.   The plan was discussed with the patient. The patient was given time to ask questions and these questions were answered. At the conclusion of their visit they had no additional questions or concerns and all questions were answered to their satisfaction.   Patient was given instructions and counseling regarding condition and for health maintenance advice. Please see specific information pulled into the AVS if appropriate.    Patient to contact provider if symptoms worsen or fail to improve.        8/14/25:  -Continue Wellbutrin  mg by mouth twice daily to target ADHD and mood.   -Continue Adderall IR 20 mg by mouth 3 TIMES DAILY to target attention and concentration deficit, symptoms of ADHD.  Last dispensed 5/20/24 #270/90 day supply.  Risks, benefits, side effects discussed with patient including elevated heart rate, elevated blood pressure, irritability, insomnia, sexual dysfunction, appetite suppressing properties, psychosis.  After discussion of these risks and benefits, the patient voiced understanding and agreed to proceed. Refilled today for 90 day supply to [facility].  Informed patient order would be sent to Dr. Enrique in separate entry for signature due to EMR system, and will not see as refilled on AVS today.   -Discontinued Trazodone from EHR as patient is no longer  taking due to not needed.    Symptoms of anxiety and ADHD are under good control with current medication regimen.  Patient is experiencing grief over loss of dog, emotional support provided.   The plan was discussed with the patient. The patient was given time to ask questions and these questions were answered. At the conclusion of their visit they had no additional questions or concerns and all questions were answered to their satisfaction.   Patient was given instructions and counseling regarding condition and for health maintenance advice. Please see specific information pulled into the AVS if appropriate.    Patient to contact provider if symptoms worsen or fail to improve.      5/14/24:  -Continue Wellbutrin  mg by mouth twice daily to target ADHD and mood.   -Continue Adderall IR 20 mg by mouth 3 TIMES DAILY to target attention and concentration deficit, symptoms of ADHD.  Last dispensed 4/26/24 #90/30 day supply.  Risks, benefits, side effects discussed with patient including elevated heart rate, elevated blood pressure, irritability, insomnia, sexual dysfunction, appetite suppressing properties, psychosis.  After discussion of these risks and benefits, the patient voiced understanding and agreed to proceed. Patient instructed to request refill for Adderall via [employer] when needed.  Patient to contact [facility] mail order pharmacy to determine turn around time for delivery, cost of a 90 day supply of Adderall IR, and if feasible and able to set up patient to indicate on refill request which may need to be submitted 7-10 days prior to due date initially, though patient will inform provider further after she calls to determine details. Will plan for a 90 day supply with next refill request.   Symptoms of anxiety and ADHD are under good control with current medication regimen.    The plan was discussed with the patient. The patient was given time to ask questions and these questions were answered. At  the conclusion of their visit they had no additional questions or concerns and all questions were answered to their satisfaction.   Patient was given instructions and counseling regarding condition and for health maintenance advice. Please see specific information pulled into the AVS if appropriate.    Patient to contact provider if symptoms worsen or fail to improve.      2/16/24:  -Continue Wellbutrin  mg by mouth twice daily to target  ADHD and mood. NR, Patient instructed to request refills when appropriate, when down to 5-7 days remaining via  pharmacy or via MyChart.       -Continue Adderall IR 20 mg by mouth 3 TIMES DAILY to target attention and concentration deficit, symptoms of ADHD.  Last dispensed 1/27/24 #90/30 day supply. Patient instructed to request refill for Adderall via MyChart when needed.    Controlled substance documentation: Ryan reviewed; prior urine drug screen consistent obtained today 2/16/24; consent is up to date, signed, witnessed and in EHR, dated today 2/16/24, which will be updated annually per policy. Patient is aware of risk of addiction on this medication, understands need to follow up for a review every 3 months and medications will be adjusted or decreased as deemed appropriate at each visit.  No history of drug or alcohol abuse.  No concerns about diversion or abuse. Patient denies side effects related to the medication.  Patient is aware of random urine drug screens and pill counts. The dosing of this medication will be reviewed on a regular basis and reduced if possible. Ongoing use of a controlled substance is necessary for this patient to have a normal quality of life.  -Continue Trazodone 50 mg by mouth nightly as needed to target insomnia.  Managed per PCP.  -Labs: POC UDS today in clinic, results noted as positive amphetamines as expected, patient informed.      Symptoms of anxiety, insomnia, ADHD are under good control with current medication regimen.    Patient  was given instructions and counseling regarding condition and for health maintenance advice. Please see specific information pulled into the AVS if appropriate.    Patient to contact provider if symptoms worsen or fail to improve.        12/18/23:   -Continue Wellbutrin  mg by mouth twice daily to target  ADHD and mood.   -Continue Adderall IR 20 mg by mouth 3 TIMES DAILY to target attention and concentration deficit, symptoms of ADHD.  Last dispensed 11/28/23 #90/30 day supply.  . Patient instructed to request refill for Adderall via MyChart when needed.  -Continue Trazodone 50 mg by mouth nightly as needed to target insomnia. Managed per PCP.    Symptoms of anxiety, depression, ADHD are under good control with current medication regimen.  Next appointment will be in office for annual CSA requirements.   Patient was given instructions and counseling regarding condition and for health maintenance advice. Please see specific information pulled into the AVS if appropriate.    Patient to contact provider if symptoms worsen or fail to improve.        9/18/23  Continue Wellbutrin  mg by mouth  twice daily to target  ADHD and mood.     Continue Adderall IR 20 mg by mouth 3 TIMES DAILY to target attention and concentration deficit, symptoms of ADHD.  Last dispensed 8/21/23 #90/30 day supply.  Risks, benefits, side effects discussed with patient including elevated heart rate, elevated blood pressure, irritability, insomnia, sexual dysfunction, appetite suppressing properties, psychosis.  After discussion of these risks and benefits, the patient voiced understanding and agreed to proceed. Will send in a 90 day supply to [facility] for insurance, cost, and availability.  Informed patient order would be sent to Dr. Enrique in separate entry for signature due to EMR system, and will not see as refilled on AVS today.   Symptoms of ADHD are under good control with current dose of Adderall IR,  Patient to contact provider if symptoms worsen or fail to improve.        7/17/23:   -Continue Wellbutrin  mg by mouth  twice daily to target  ADHD and mood. Refilled today  -Continue Adderall IR 20 mg by mouth 3 TIMES DAILY to target attention and concentration deficit, symptoms of ADHD.  Last dispensed 6/23/23 #90/30 day supply.  Will continue to send to [facility] pharmacy due to availability of Adderall as [facility] has been running out of medication.  Patient instructed to Request refills when needed; earliest fill date for Adderall IR 20 mg 6/22/23.     Controlled substance documentation: Ryan reviewed; prior urine drug screen consistent obtained 2/16/23; consent is up to date, signed, witnessed and in EHR, dated 2/16/23, which will be updated annually per policy. Patient is aware of risk of addiction on this medication, understands need to follow up for a review every 3 months and medications will be adjusted or decreased as deemed appropriate at each visit.  No history of drug or alcohol abuse.  No concerns about diversion or abuse. Patient denies side effects related to the medication.  Patient is aware of random urine drug screens and pill counts. The dosing of this medication will be reviewed on a regular basis and reduced if possible. Ongoing use of a controlled substance is necessary for this patient to have a normal quality of life.  Symptoms of ADHD are under good control with current medication regimen.   Patient was given instructions and counseling regarding condition and for health maintenance advice. Please see specific information pulled into the AVS if appropriate.    Patient to contact provider if symptoms worsen or fail to improve.      4/17/23:  -Continue Wellbutrin  mg by mouth  twice daily to target anxiety, attention & concentration deficit.   -Continue Adderall IR 20 mg by mouth 3 TIMES DAILY to target attention and concentration deficit, symptoms of ADHD.  Last dispensed 3/27/23  #90/30 day supply.  Risks, benefits, side effects discussed with patient including elevated heart rate, elevated blood pressure, irritability, insomnia, sexual dysfunction, appetite suppressing properties, psychosis.  After discussion of these risks and benefits, the patient voiced understanding and agreed to proceed.  Patient instructed to contact pharmacy when refill needed as patient is not due for refill today.  Will continue to send to [facility] pharmacy due to availability of Adderall as [facility] been [facility] of [facility].  Patient instructed to Request refills when needed; earliest fill date for Adderall IR 20 mg 3/25/23, request no later than 4/24/23.   Symptoms of ADHD are under good control with current dose of Adderall IR. Tolerating well, without reported side effects. Patient to contact provider if symptoms worsen or fail to improve.       2/16/23:    Continue Wellbutrin  mg by mouth twice daily to target anxiety, attention & concentration deficit.     Continue Adderall IR 20 mg by mouth 3 TIMES DAILY to target attention and concentration deficit, symptoms of ADHD.  Last dispensed 1/24/23 #90/30 day supply.  Risks, benefits, side effects discussed with patient including elevated heart rate, elevated blood pressure, irritability, insomnia, sexual dysfunction, appetite suppressing properties, psychosis.  After discussion of these risks and benefits, the patient voiced understanding and agreed to proceed.  Patient instructed to contact pharmacy when refill needed as patient is not due for refill today.  Will continue to send to [facility] pharmacy due to availability of Adderall as [facility].   Controlled substance documentation: Ryan reviewed; prior urine drug screen consistent obtained 2/15/22, UDS today as expected 2/16/23; consent is up to date, signed, witnessed and in EHR, dated 2/16/23, which will be updated annually per policy. Patient is aware of risk of addiction  on this medication, understands need to follow up for a review every 3 months and medications will be adjusted or decreased as deemed appropriate at each visit.  No history of drug or alcohol abuse.  No concerns about diversion or abuse. Patient denies side effects related to the medication.  Patient is aware of random urine drug screens and pill counts. The dosing of this medication will be reviewed on a regular basis and reduced if possible..  Ongoing use of a controlled substance is necessary for this patient to have a normal quality of life.  POC UDS today in clinic, results positive for amphetamines as expected.     Patient encouraged to check prescription bottle description of tablet, as some pharmacies have been receiving Adderall IR from various manufactures, though the 2 should not be mixed in one bottle per prior discussions with pharmacy stafff.     Symptoms of ADHD are under good control with current dose of Adderall IR. Tolerating well, without reported side effects. Patient to contact provider if symptoms worsen or fail to improve.         1/11/23:   Continue Wellbutrin  mg by mouth  twice daily to target anxiety, attention & concentration deficit.     INCREASE Adderall IR FROM 20 mg by mouth 2 times daily (7am&6pm) AND one half tablet to equal 10 mg at midday at 12-1 pm TO 20 mg by mouth 3 TIMES DAILY to target attention and concentration deficit.  Last dispensed 12/28/22 #75/30 day supply.    Risks, benefits, side effects discussed with patient including elevated heart rate, elevated blood pressure, irritability, insomnia, sexual dysfunction, appetite suppressing properties, psychosis.  After discussion of these risks and benefits, the patient voiced understanding and agreed to proceed.  Patient instructed to contact MA directly in office when refill needed as patient is not due for refill today and has been instructed to start taking 3 of the 20 mg daily and will deplete current supply before  "1/28/23.  Updated order in EHR, ordered as a NO PRINT.  Will continue to send to [facility] pharmacy due to availability of Adderall as [facility] has been running out of medication.     Patient to be seen in office for next visit to sign annual CSA and provide UDS.    Symptoms of ADHD are under fair control with current Adderall regimen, will increase afternoon/midday dose today, patient instructed to contact provider if unable to tolerate, denies side effects.  Morning and Evening doses of Adderall IR 20 mg have been effective thus far in management of ADHD symptoms.  Coordinated care with patient for an in office visit next month, patient instructed to arrive at [facility] at 1245 2/16/23 and provide urine sample and for in office visit, so patient can leave in timely manner to arrive to employer in [place]. Informed office staff of scheduling and no need to contact patient to set up appointment. Patient to contact provider if symptoms worsen or fail to improve.       12/27/22:  TELEPHONE  Patient called asking if she can be changed to something other than Adderall (\"since it is currently unavailable, or hard to find?\").  Please advise  Please advise her to contact various pharmacies to determine availability, such as [facility], [facility], [facility] are a few I can recall that have had medication in supply. She will have to be seen before switching medications, and I wouldn't advise switching as she has done well with Adderall and it is available, though some pharmacies are not getting at routine intervals.  But she will need to call around and see which pharmacies are able to fill medication.  It looks like [facility] has filled Adderall every month.   Called and advised patient of Mallorie's response.  Patient says she will call around and see where she can find it and call me back and let me know.  Patient called around and would like for you to cancel the Rx to [facility] and resend to [facility].(she says they have " it in stock.  [facility]  Dr. Enrique is out this week, so I will send request to NIKUNJ Reyes, last dispensed 11/28/22, patient did not  recent order from 12/16/22.     12/6/22:  Declines therapy  Continue Wellbutrin  mg by mouth  twice daily to target anxiety, attention & concentration deficit.     Continue Adderall IR 20 mg by mouth 2 times daily (7am&6pm) AND one half tablet to equal 10 mg at midday at 12-1 pm to target attention and concentration deficit.  Last dispensed 11/28/22 #60/30 day supply.  Patient had requested short supply on 11/28/22, however, order was sent as previous prescription for twice daily dosing.  10/21/22 #75/30 days with changes. Risks, benefits, side effects discussed with patient including elevated heart rate, elevated blood pressure, irritability, insomnia, sexual dysfunction, appetite suppressing properties, psychosis.  After discussion of these risks and benefits, the patient voiced understanding and agreed to proceed.  Patient instructed to notify provider at office no later than Thurs 12/15/22 so correct prescription is ordered.  Tolerating new dosing well with reported effectiveness.     Controlled substance documentation: Ryan reviewed; prior urine drug screen consistent obtained 2/15/22; consent is up to date, signed, witnessed and in EHR, dated 2/15/22, which will be updated annually per policy. Patient is aware of risk of addiction on this medication, understands need to follow up for a review every 3 months and medications will be adjusted or decreased as deemed appropriate at each visit.  No history of drug or alcohol abuse.  No concerns about diversion or abuse. Patient denies side effects related to the medication.  Patient is aware of random urine drug screens and pill counts. The dosing of this medication will be reviewed on a regular basis and reduced if possible..  Ongoing use of a controlled substance is necessary for this patient to  have a normal quality of life.  Symptoms of anxiety and sleep disturbance appear to be chronic and unrelated to medications, as patient is working 60 hrs/week and going to school full time.  Symptoms of anxiety, ADHD are under good control with Wellbutrin and Adderall.  Patient to contact provider if symptoms worsen or fail to improve.     10/21/22:   Continue Wellbutrin  mg by mouth  twice daily to target anxiety, attention & concentration deficit.     Continue Adderall IR 20 mg by mouth twice daily to target attention and concentration deficit.  Last dispensed 9/16/22 #60/30 day supply. Instructed to continue 7 am dose and take 2nd dose later at 6 pm.   ADD Adderall IR 10 mg by mouth daily at 1 pm to target attention and concentration deficit. Risks, benefits, side effects discussed with patient including elevated heart rate, elevated blood pressure, irritability, insomnia, sexual dysfunction, appetite suppressing properties, psychosis.  After discussion of these risks and benefits, the patient voiced understanding and agreed to proceed.   Informed patient order would be sent to Dr. Enrique in separate entry for signature due to EMR system, and will not see as refilled on AVS today.  Controlled substance documentation: Ryan reviewed; prior urine drug screen consistent obtained 2/15/22; consent is up to date, signed, witnessed and in EHR, dated 2/15/22, which will be updated annually per policy. Patient is aware of risk of addiction on this medication, understands need to follow up for a review every 3 months and medications will be adjusted or decreased as deemed appropriate at each visit.  No history of drug or alcohol abuse.  No concerns about diversion or abuse. Patient denies side effects related to the medication.  Patient is aware of random urine drug screens and pill counts. The dosing of this medication will be reviewed on a regular basis and reduced if possible..  Ongoing use of a controlled  substance is necessary for this patient to have a normal quality of life.  Collaborated with  regarding adding a 3rd dose of Adderall IR as patient day starts at 7 am and ends at 12 am with work and school schedule.  Will add 10 mg to take midday at 12-1 pm.        9/16/22:   Declines therapy  Continue Wellbutrin  mg by mouth  twice daily to target anxiety, attention & concentration deficit.   Continue Adderall IR 20 mg by mouth twice daily to target attention and concentration deficit.  Last dispensed 8/17/22 for 30 day supply #60 per   Will send order to  today. Will inquire of 3 month supply with , however, explained to patient a 90 day supply would not be ordered until stabilized on a maintanence dose, patient verbalized understanding.   Tolerating medications well, denies side effects, attention and concentration has improved with increase dose.    Controlled substance documentation: Ryan reviewed; prior urine drug screen consistent obtained 2/15/22; consent is up to date, signed, witnessed and in EHR, dated 2/15/22, which will be updated annually per policy. Patient is aware of risk of addiction on this medication, understands need to follow up for a review every 3 months and medications will be adjusted or decreased as deemed appropriate at each visit.  No history of drug or alcohol abuse.  No concerns about diversion or abuse. Patient denies side effects related to the medication.  Patient is aware of random urine drug screens and pill counts. The dosing of this medication will be reviewed on a regular basis and reduced if possible..  Ongoing use of a controlled substance is necessary for this patient to have a normal quality of life.    8/15/22:   Continue Wellbutrin  mg by mouth  twice daily to target anxiety, attention & concentration deficit. Refilled today     Increase Adderall IR from 15 mg to 20 mg by mouth twice daily to target attention and concentration  deficit.  Last dispensed 7/19/22 for 30 day supply #60 per   Will send order to  today and allow first fill 8/17/22.     Will increase Adderall IR to 20 mg twice daily as patient has been experiencing inability to complete tasks as she is shifting from one uncompleted tasks to another in between morning and mid afternoon doses of 15 mg IR.       7/11/22:   Continue Wellbutrin  mg by mouth  twice daily to target anxiety, attention & concentration deficit.      Continue Adderall IR 15 mg by mouth twice daily to target attention and concentration deficit.  Last dispensed 6/18/22 for 30 day supply #60 per   Patient doing well with current medication regimen will continue current medications and send update to  for refill of Adderall IR. Will see patient back in 5 weeks    6/13/22:   Patient tolerating Adderall 15 mg IR twice daily without difficulty, symptoms are managed well currently. Will send message to  informing of toleration and refill request, patient was informed medication would not be available for refill until closer to 6/19/22, will have patient return in 4-5 weeks closer to refill date.   Continue Wellbutrin  mg by mouth  twice daily to target anxiety, attention & concentration deficit.    Continue Adderall IR 15 mg by mouth twice daily to target attention and concentration deficit.  Last dispensed 5/19/22 for 30 day supply #60 per     5/11/22:   Continue Wellbutrin  mg by mouth  twice daily to target anxiety, attention & concentration deficit.      Will stop Adderall XR 15 mg due to hot flash sensation and ineffectiveness.   Will plan on changing Adderall IR 10 mg by mouth every afternoon to Adderall IR 15 mg by mouth twice daily to target attention and concentration deficit.  Last IR dispensed 4/18/22, will send message to Dr. Enrique regarding recommendations and symptoms, patient informed medication would not be available for  until  5/18/22, patient verbalized understanding.       4/13/22:  Continue Wellbutrin  mg by mouth  twice daily to target anxiety, attention & concentration deficit.      Start Adderall XR 15 mg by mouth daily every morning and continue Adderall IR 10 mg by mouth every afternoon to target attention and concentration deficit.  Denies side effects of medication. Message sent to Dr. Enrique regarding an update of patient symptoms and plan.     3/15/22:   Increase Wellbutrin  mg by mouth daily to twice daily as patient reports at times taking twice daily to target anxiety, attention & concentration deficit.      Increase Adderall IR 10 mg by mouth from once daily to twice daily to target attention and concentration deficit, take one in the morning and the 2nd dose in afternoon approximately 6 hr apart.    Denies side effects of medication. Message sent to Dr. Enrique regarding an update of patient symptoms and plan.     2/15/22:   -POC UDS  -CSA signed and reviewed with patient   -Continue Wellbutrin  mg by mouth daily to target anxiety, attention & concentration deficit.    -Adderall IR 5 mg by mouth twice daily to target attention and concentration deficit, take one in the morning and the 2nd dose in afternoon approximately 6 hr apart.  Risks, benefits, side effects discussed with patient including elevated heart rate, elevated blood pressure, irritability, insomnia, sexual dysfunction, appetite suppressing properties, psychosis.  After discussion of these risks and benefits, the patient voiced understanding and agreed to proceed. UDS ordered, Ryan reviewed. Informed patient medication would not be ordered until UDS results received, and would be ordered per Dr. Enrique.   Adderall IR 10 mg by mouth daily was ordered per  on 2/15/22      11/30/22:   Change Wellbutrin  mg twice daily to Wellbutrin  mg by mouth daily in the am to target anxiety, attention & concentration deficit.    ADHD  testing referral to Al Cole with [facility] in [place], [place] as patient prefers to see provider closer to home.  List given of providers (4) for testing, patient to contact to schedule appointment.    Patient screened positive for depression based on a PHQ-9 score of 6 on 2/10/2025. Follow-up recommendations include: Prescribed antidepressant medication treatment and Suicide Risk Assessment performed. (PHQ2-0)     TREATMENT PLAN/GOALS: Continue supportive psychotherapy efforts and medications as indicated. Treatment and medication options discussed during today's visit. Patient acknowledged and verbally consented to continue with current treatment plan and was educated on the importance of compliance with treatment and follow-up appointments.    MEDICATION ISSUES:  JOVITA reviewed as expected.  Discussed medication options and treatment plan of prescribed medication as well as the risks, benefits, and side effects including potential falls, possible impaired driving and metabolic adversities among others. Patient is agreeable to call the office with any worsening of symptoms or onset of side effects. Patient is agreeable to call 911 or go to the nearest ER should he/she begin having SI/HI. No medication side effects or related complaints today.     MEDS ORDERED DURING VISIT:  New Medications Ordered This Visit   Medications    buPROPion SR (WELLBUTRIN SR) 150 MG 12 hr tablet     Sig: Take 1 tablet by mouth 2 (Two) Times a Day. Indications: Attention Deficit Hyperactivity Disorder, Major Depressive Disorder     Dispense:  180 tablet     Refill:  3     All psychotropic medications to come from this provider. Please disregard previous order for XL as patient takes SR twice daily.       Return in about 3 months (around 5/10/2025) for Video visit, medication check.          I spent 31 minutes caring for Vickie on this date of service. This time includes time spent by me in the following activities:  preparing for the visit, reviewing tests, obtaining and/or reviewing a separately obtained history, performing a medically appropriate examination and/or evaluation, counseling and educating the patient/family/caregiver, ordering medications, tests, or procedures, referring and communicating with other health care professionals, documenting information in the medical record, care coordination, and scheduling       This document has been electronically signed by NIKUNJ Neri  February 10, 2025 14:03 EST      Part of this note may be an electronic transcription/translation of spoken language to printed text using the Dragon Dictation System.

## 2025-02-14 LAB
AMPHET UR CFM-MCNC: 1754 NG/ML
AMPHET UR QL CFM: POSITIVE
AMPHETAMINES UR QL: POSITIVE
LABORATORY COMMENT REPORT: ABNORMAL
METHAMPHET UR QL CFM: NEGATIVE

## 2025-02-16 DIAGNOSIS — F90.0 ADHD (ATTENTION DEFICIT HYPERACTIVITY DISORDER), INATTENTIVE TYPE: ICD-10-CM

## 2025-02-16 RX ORDER — DEXTROAMPHETAMINE SACCHARATE, AMPHETAMINE ASPARTATE, DEXTROAMPHETAMINE SULFATE AND AMPHETAMINE SULFATE 5; 5; 5; 5 MG/1; MG/1; MG/1; MG/1
20 TABLET ORAL 3 TIMES DAILY
Qty: 270 TABLET | Refills: 0 | Status: SHIPPED | OUTPATIENT
Start: 2025-02-17 | End: 2025-05-18

## 2025-05-09 ENCOUNTER — TELEMEDICINE (OUTPATIENT)
Dept: BEHAVIORAL HEALTH | Facility: CLINIC | Age: 55
End: 2025-05-09
Payer: COMMERCIAL

## 2025-05-09 DIAGNOSIS — F90.0 ADHD (ATTENTION DEFICIT HYPERACTIVITY DISORDER), INATTENTIVE TYPE: Primary | ICD-10-CM

## 2025-05-09 DIAGNOSIS — Z71.89 MEDICATION CARE PLAN DISCUSSED WITH PATIENT: ICD-10-CM

## 2025-05-09 DIAGNOSIS — F90.0 ADHD (ATTENTION DEFICIT HYPERACTIVITY DISORDER), INATTENTIVE TYPE: ICD-10-CM

## 2025-05-09 DIAGNOSIS — F41.1 GENERALIZED ANXIETY DISORDER: ICD-10-CM

## 2025-05-09 DIAGNOSIS — Z79.899 MEDICATION MANAGEMENT: ICD-10-CM

## 2025-05-09 RX ORDER — CYCLOBENZAPRINE HCL 5 MG
TABLET ORAL
COMMUNITY
Start: 2025-04-13

## 2025-05-09 RX ORDER — DEXTROAMPHETAMINE SACCHARATE, AMPHETAMINE ASPARTATE, DEXTROAMPHETAMINE SULFATE AND AMPHETAMINE SULFATE 5; 5; 5; 5 MG/1; MG/1; MG/1; MG/1
20 TABLET ORAL 3 TIMES DAILY
Qty: 270 TABLET | Refills: 0 | Status: SHIPPED | OUTPATIENT
Start: 2025-05-16 | End: 2025-08-14

## 2025-05-09 NOTE — PATIENT INSTRUCTIONS
"Should you want to get in touch with your provider, NIKUNJ Neri, please contact MY Medical Assistant, Manjula, directly at [phone].  Recommend saving Manjula's direct number in phone as this is the PREFERRED & EASIEST way to get in contact with your provider.  Please leave a voice mail if you do not get an answer and she will return your call within 24 hrs. You will NOT be able to contact provider on [facility], as Behavioral Health Providers are restricted. YOU MUST CALL [phone]  If you need to speak with the on call provider after hours or on weekends, please Contact the [facility] ([phone]) and staff will be able to page the provider on call directly.     SPECIFIC RECOMMENDATIONS:     1.      Medications discussed at this encounter:                   -  Will send prescription for Adderall to  for signature.    2.      Psychotherapy recommendations:  Declined     3.     Return to clinic: 3 months Friday 8/8/25 at 8am via video    Please arrive at least 15 minutes before your scheduled appointment time to complete check in process.      IF you are scheduled for a [facility] VIDEO visit, YOU MUST COMPLETE THE \"E-CHECK IN\" PROCESS PRIOR TO BEGINNING THE VISIT, You may still complete the E-Check in for in office visits prior to appointment, you will receive multiple text/email reminders which will direct you further if needed.            "

## 2025-05-09 NOTE — PROGRESS NOTES
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 conclusion of their visit they had no additional questions or concerns and all questions were answered to their satisfaction.   Patient was given instructions and counseling regarding condition and for health maintenance advice. Please see specific information pulled into the AVS if appropriate.    Patient to contact provider if symptoms worsen or fail to improve.      5/14/24:  -Continue Wellbutrin  mg by mouth twice daily to target ADHD and mood.   -Continue Adderall IR 20 mg by mouth 3 TIMES DAILY to target attention and concentration deficit, symptoms of ADHD.  Last dispensed 4/26/24 #90/30 day supply.  Risks, benefits, side effects discussed with patient including elevated heart rate, elevated blood pressure, irritability, insomnia, sexual  dysfunction, appetite suppressing properties, psychosis.  After discussion of these risks and benefits, the patient voiced understanding and agreed to proceed. Patient instructed to request refill for Adderall via MyChart when needed.  Patient to contact [facility] mail order pharmacy to determine turn around time for delivery, cost of a 90 day supply of Adderall IR, and if feasible and able to set up patient to indicate on refill request which may need to be submitted 7-10 days prior to due date initially, though patient will inform provider further after she calls to determine details. Will plan for a 90 day supply with next refill request.   Symptoms of anxiety and ADHD are under good control with current medication regimen.    The plan was discussed with the patient. The patient was given time to ask questions and these questions were answered. At the conclusion of their visit they had no additional questions or concerns and all questions were answered to their satisfaction.   Patient was given instructions and counseling regarding condition and for health maintenance advice. Please see specific information pulled into the AVS if appropriate.    Patient to contact provider if symptoms worsen or fail to improve.      2/16/24:  -Continue Wellbutrin  mg by mouth twice daily to target  ADHD and mood. NR, Patient instructed to request refills when appropriate, when down to 5-7 days remaining via  pharmacy or via MyChart.       -Continue Adderall IR 20 mg by mouth 3 TIMES DAILY to target attention and concentration deficit, symptoms of ADHD.  Last dispensed 1/27/24 #90/30 day supply. Patient instructed to request refill for Adderall via MyChart when needed.    Controlled substance documentation: Ryan reviewed; prior urine drug screen consistent obtained today 2/16/24; consent is up to date, signed, witnessed and in EHR, dated today 2/16/24, which will be updated annually per policy. Patient is aware of risk  of addiction on this medication, understands need to follow up for a review every 3 months and medications will be adjusted or decreased as deemed appropriate at each visit.  No history of drug or alcohol abuse.  No concerns about diversion or abuse. Patient denies side effects related to the medication.  Patient is aware of random urine drug screens and pill counts. The dosing of this medication will be reviewed on a regular basis and reduced if possible. Ongoing use of a controlled substance is necessary for this patient to have a normal quality of life.  -Continue Trazodone 50 mg by mouth nightly as needed to target insomnia.  Managed per PCP.  -Labs: POC UDS today in clinic, results noted as positive amphetamines as expected, patient informed.      Symptoms of anxiety, insomnia, ADHD are under good control with current medication regimen.    Patient was given instructions and counseling regarding condition and for health maintenance advice. Please see specific information pulled into the AVS if appropriate.    Patient to contact provider if symptoms worsen or fail to improve.        12/18/23:   -Continue Wellbutrin  mg by mouth twice daily to target  ADHD and mood.   -Continue Adderall IR 20 mg by mouth 3 TIMES DAILY to target attention and concentration deficit, symptoms of ADHD.  Last dispensed 11/28/23 #90/30 day supply.  . Patient instructed to request refill for Adderall via [facility] when needed.  -Continue Trazodone 50 mg by mouth nightly as needed to target insomnia. Managed per PCP.    Symptoms of anxiety, depression, ADHD are under good control with current medication regimen.  Next appointment will be in office for annual CSA requirements.   Patient was given instructions and counseling regarding condition and for health maintenance advice. Please see specific information pulled into the AVS if appropriate.    Patient to contact provider if symptoms worsen or fail to improve.         9/18/23  Continue Wellbutrin  mg by mouth  twice daily to target  ADHD and mood.     Continue Adderall IR 20 mg by mouth 3 TIMES DAILY to target attention and concentration deficit, symptoms of ADHD.  Last dispensed 8/21/23 #90/30 day supply.  Risks, benefits, side effects discussed with patient including elevated heart rate, elevated blood pressure, irritability, insomnia, sexual dysfunction, appetite suppressing properties, psychosis.  After discussion of these risks and benefits, the patient voiced understanding and agreed to proceed. Will send in a 90 day supply to [facility] Mail order pharmacy for insurance, cost, and availability.  Informed patient order would be sent to Dr. Enrique in separate entry for signature due to EMR system, and will not see as refilled on AVS today.   Symptoms of ADHD are under good control with current dose of Adderall IR, Patient to contact provider if symptoms worsen or fail to improve.        7/17/23:   -Continue Wellbutrin  mg by mouth  twice daily to target  ADHD and mood. Refilled today  -Continue Adderall IR 20 mg by mouth 3 TIMES DAILY to target attention and concentration deficit, symptoms of ADHD.  Last dispensed 6/23/23 #90/30 day supply.  Will continue to send to [facility] pharmacy due to availability of Adderall as [facility] has been running out of medication.  Patient instructed to Request refills when needed; earliest fill date for Adderall IR 20 mg 6/22/23.     Controlled substance documentation: Ryan reviewed; prior urine drug screen consistent obtained 2/16/23; consent is up to date, signed, witnessed and in EHR, dated 2/16/23, which will be updated annually per policy. Patient is aware of risk of addiction on this medication, understands need to follow up for a review every 3 months and medications will be adjusted or decreased as deemed appropriate at each visit.  No history of drug or alcohol abuse.  No concerns about diversion or abuse. Patient  denies side effects related to the medication.  Patient is aware of random urine drug screens and pill counts. The dosing of this medication will be reviewed on a regular basis and reduced if possible. Ongoing use of a controlled substance is necessary for this patient to have a normal quality of life.  Symptoms of ADHD are under good control with current medication regimen.   Patient was given instructions and counseling regarding condition and for health maintenance advice. Please see specific information pulled into the AVS if appropriate.    Patient to contact provider if symptoms worsen or fail to improve.      4/17/23:  -Continue Wellbutrin  mg by mouth  twice daily to target anxiety, attention & concentration deficit.   -Continue Adderall IR 20 mg by mouth 3 TIMES DAILY to target attention and concentration deficit, symptoms of ADHD.  Last dispensed 3/27/23 #90/30 day supply.  Risks, benefits, side effects discussed with patient including elevated heart rate, elevated blood pressure, irritability, insomnia, sexual dysfunction, appetite suppressing properties, psychosis.  After discussion of these risks and benefits, the patient voiced understanding and agreed to proceed.  Patient instructed to contact pharmacy when refill needed as patient is not due for refill today.  Will continue to send to [facility] pharmacy due to availability of Adderall as [facility] has been running out of medication.  Patient instructed to Request refills when needed; earliest fill date for Adderall IR 20 mg 3/25/23, request no later than 4/24/23.   Symptoms of ADHD are under good control with current dose of Adderall IR. Tolerating well, without reported side effects. Patient to contact provider if symptoms worsen or fail to improve.       2/16/23:    Continue Wellbutrin  mg by mouth twice daily to target anxiety, attention & concentration deficit.     Continue Adderall IR 20 mg by mouth 3 TIMES DAILY to target attention and  concentration deficit, symptoms of ADHD.  Last dispensed 1/24/23 #90/30 day supply.  Risks, benefits, side effects discussed with patient including elevated heart rate, elevated blood pressure, irritability, insomnia, sexual dysfunction, appetite suppressing properties, psychosis.  After discussion of these risks and benefits, the patient voiced understanding and agreed to proceed.  Patient instructed to contact pharmacy when refill needed as patient is not due for refill today.  Will continue to send to [facility] pharmacy due to availability of Adderall as [facility] has been running out of medication.   Controlled substance documentation: Ryan reviewed; prior urine drug screen consistent obtained 2/15/22, UDS today as expected 2/16/23; consent is up to date, signed, witnessed and in EHR, dated 2/16/23, which will be updated annually per policy. Patient is aware of risk of addiction on this medication, understands need to follow up for a review every 3 months and medications will be adjusted or decreased as deemed appropriate at each visit.  No history of drug or alcohol abuse.  No concerns about diversion or abuse. Patient denies side effects related to the medication.  Patient is aware of random urine drug screens and pill counts. The dosing of this medication will be reviewed on a regular basis and reduced if possible..  Ongoing use of a controlled substance is necessary for this patient to have a normal quality of life.  POC UDS today in clinic, results positive for amphetamines as expected.     Patient encouraged to check prescription bottle description of tablet, as some pharmacies have been receiving Adderall IR from various manufactures, though the 2 should not be mixed in one bottle per prior discussions with pharmacy stafff.     Symptoms of ADHD are under good control with current dose of Adderall IR. Tolerating well, without reported side effects. Patient to contact provider if symptoms worsen or fail to  improve.         1/11/23:   Continue Wellbutrin  mg by mouth  twice daily to target anxiety, attention & concentration deficit.     INCREASE Adderall IR FROM 20 mg by mouth 2 times daily (7am&6pm) AND one half tablet to equal 10 mg at midday at 12-1 pm TO 20 mg by mouth 3 TIMES DAILY to target attention and concentration deficit.  Last dispensed 12/28/22 #75/30 day supply.    Risks, benefits, side effects discussed with patient including elevated heart rate, elevated blood pressure, irritability, insomnia, sexual dysfunction, appetite suppressing properties, psychosis.  After discussion of these risks and benefits, the patient voiced understanding and agreed to proceed.  Patient instructed to contact MA directly in office when refill needed as patient is not due for refill today and has been instructed to start taking 3 of the 20 mg daily and will deplete current supply before 1/28/23.  Updated order in EHR, ordered as a NO PRINT.  Will continue to send to [facility] pharmacy due to availability of Adderall as [facility] has been running [facility] of medication.     Patient to be seen in office for next visit to sign annual CSA and provide UDS.    Symptoms of ADHD are under fair control with current Adderall regimen, will increase afternoon/midday dose today, patient instructed to contact provider if unable to tolerate, denies side effects.  Morning and Evening doses of Adderall IR 20 mg have been effective thus far in management of ADHD symptoms.  Coordinated care with patient for an in office visit next month, patient instructed to arrive at [facility] at 1245 2/16/23 and provide urine sample and for in office visit, so patient can leave in timely manner to arrive to employer in [place]. Informed office staff of scheduling and no need to contact patient to set up appointment. Patient to contact provider if symptoms worsen or fail to improve.       12/27/22:  TELEPHONE  Patient called asking if she can be changed to  "something other than Adderall (\"since it is currently unavailable, or hard to find?\").  Please advise  Please advise her to contact various pharmacies to determine availability, such as [facility], [facility], [facility] are a few I can recall that have had medication in supply. She will have to be seen before switching medications, and I wouldn't advise switching as she has done well with Adderall and it is available, though some pharmacies are not getting at routine intervals.  But she will need to call around and see which pharmacies are able to fill medication.  It looks like [facility] has filled Adderall every month.   Called and advised patient of Mallorie's response.  Patient says she will call around and see where she can find it and call me back and let me know.  Patient called around and would like for you to cancel the Rx to [facility] and resend to [facility] on [address] [facility].(she says they have it in stock.  Please resend to the [facility].  Dr. Enrique is out this week, so I will send request to [facility], last dispensed 11/28/22, patient did not  recent order from 12/16/22.     12/6/22:  Declines therapy  Continue Wellbutrin  mg by mouth  twice daily to target anxiety, attention & concentration deficit.     Continue Adderall IR 20 mg by mouth 2 times daily (7am&6pm) AND one half tablet to equal 10 mg at midday at 12-1 pm to target attention and concentration deficit.  Last dispensed 11/28/22 #60/30 day supply.  Patient had requested short supply on 11/28/22, however, order was sent as previous prescription for twice daily dosing.  10/21/22 #75/30 days with changes. Risks, benefits, side effects discussed with patient including elevated heart rate, elevated blood pressure, irritability, insomnia, sexual dysfunction, appetite suppressing properties, psychosis.  After discussion of these risks and benefits, the patient voiced understanding and agreed to proceed.  Patient instructed to notify provider at office no " later than Thurs 12/15/22 so correct prescription is ordered.  Tolerating new dosing well with reported effectiveness.     Controlled substance documentation: Ryan reviewed; prior urine drug screen consistent obtained 2/15/22; consent is up to date, signed, witnessed and in EHR, dated 2/15/22, which will be updated annually per policy. Patient is aware of risk of addiction on this medication, understands need to follow up for a review every 3 months and medications will be adjusted or decreased as deemed appropriate at each visit.  No history of drug or alcohol abuse.  No concerns about diversion or abuse. Patient denies side effects related to the medication.  Patient is aware of random urine drug screens and pill counts. The dosing of this medication will be reviewed on a regular basis and reduced if possible..  Ongoing use of a controlled substance is necessary for this patient to have a normal quality of life.  Symptoms of anxiety and sleep disturbance appear to be chronic and unrelated to medications, as patient is working 60 hrs/week and going to school full time.  Symptoms of anxiety, ADHD are under good control with Wellbutrin and Adderall.  Patient to contact provider if symptoms worsen or fail to improve.     10/21/22:   Continue Wellbutrin  mg by mouth  twice daily to target anxiety, attention & concentration deficit.     Continue Adderall IR 20 mg by mouth twice daily to target attention and concentration deficit.  Last dispensed 9/16/22 #60/30 day supply. Instructed to continue 7 am dose and take 2nd dose later at 6 pm.   ADD Adderall IR 10 mg by mouth daily at 1 pm to target attention and concentration deficit. Risks, benefits, side effects discussed with patient including elevated heart rate, elevated blood pressure, irritability, insomnia, sexual dysfunction, appetite suppressing properties, psychosis.  After discussion of these risks and benefits, the patient voiced understanding and agreed  to proceed.   Informed patient order would be sent to Dr. Enrique in separate entry for signature due to EMR system, and will not see as refilled on AVS today.  Controlled substance documentation: Ryan reviewed; prior urine drug screen consistent obtained 2/15/22; consent is up to date, signed, witnessed and in EHR, dated 2/15/22, which will be updated annually per policy. Patient is aware of risk of addiction on this medication, understands need to follow up for a review every 3 months and medications will be adjusted or decreased as deemed appropriate at each visit.  No history of drug or alcohol abuse.  No concerns about diversion or abuse. Patient denies side effects related to the medication.  Patient is aware of random urine drug screens and pill counts. The dosing of this medication will be reviewed on a regular basis and reduced if possible..  Ongoing use of a controlled substance is necessary for this patient to have a normal quality of life.  Collaborated with  regarding adding a 3rd dose of Adderall IR as patient day starts at 7 am and ends at 12 am with work and school schedule.  Will add 10 mg to take midday at 12-1 pm.        9/16/22:   Declines therapy  Continue Wellbutrin  mg by mouth  twice daily to target anxiety, attention & concentration deficit.   Continue Adderall IR 20 mg by mouth twice daily to target attention and concentration deficit.  Last dispensed 8/17/22 for 30 day supply #60 per   Will send order to  today. Will inquire of 3 month supply with , however, explained to patient a 90 day supply would not be ordered until stabilized on a maintanence dose, patient verbalized understanding.   Tolerating medications well, denies side effects, attention and concentration has improved with increase dose.    Controlled substance documentation: Ryan reviewed; prior urine drug screen consistent obtained 2/15/22; consent is up to date, signed, witnessed and in  EHR, dated 2/15/22, which will be updated annually per policy. Patient is aware of risk of addiction on this medication, understands need to follow up for a review every 3 months and medications will be adjusted or decreased as deemed appropriate at each visit.  No history of drug or alcohol abuse.  No concerns about diversion or abuse. Patient denies side effects related to the medication.  Patient is aware of random urine drug screens and pill counts. The dosing of this medication will be reviewed on a regular basis and reduced if possible..  Ongoing use of a controlled substance is necessary for this patient to have a normal quality of life.    8/15/22:   Continue Wellbutrin  mg by mouth  twice daily to target anxiety, attention & concentration deficit. Refilled today     Increase Adderall IR from 15 mg to 20 mg by mouth twice daily to target attention and concentration deficit.  Last dispensed 7/19/22 for 30 day supply #60 per   Will send order to  today and allow first fill 8/17/22.     Will increase Adderall IR to 20 mg twice daily as patient has been experiencing inability to complete tasks as she is shifting from one uncompleted tasks to another in between morning and mid afternoon doses of 15 mg IR.       7/11/22:   Continue Wellbutrin  mg by mouth  twice daily to target anxiety, attention & concentration deficit.      Continue Adderall IR 15 mg by mouth twice daily to target attention and concentration deficit.  Last dispensed 6/18/22 for 30 day supply #60 per   Patient doing well with current medication regimen will continue current medications and send update to  for refill of Adderall IR. Will see patient back in 5 weeks    6/13/22:   Patient tolerating Adderall 15 mg IR twice daily without difficulty, symptoms are managed well currently. Will send message to  informing of toleration and refill request, patient was informed medication would not be  available for refill until closer to 6/19/22, will have patient return in 4-5 weeks closer to refill date.   Continue Wellbutrin  mg by mouth  twice daily to target anxiety, attention & concentration deficit.    Continue Adderall IR 15 mg by mouth twice daily to target attention and concentration deficit.  Last dispensed 5/19/22 for 30 day supply #60 per     5/11/22:   Continue Wellbutrin  mg by mouth  twice daily to target anxiety, attention & concentration deficit.      Will stop Adderall XR 15 mg due to hot flash sensation and ineffectiveness.   Will plan on changing Adderall IR 10 mg by mouth every afternoon to Adderall IR 15 mg by mouth twice daily to target attention and concentration deficit.  Last IR dispensed 4/18/22, will send message to Dr. Enrique regarding recommendations and symptoms, patient informed medication would not be available for  until 5/18/22, patient verbalized understanding.       4/13/22:  Continue Wellbutrin  mg by mouth  twice daily to target anxiety, attention & concentration deficit.      Start Adderall XR 15 mg by mouth daily every morning and continue Adderall IR 10 mg by mouth every afternoon to target attention and concentration deficit.  Denies side effects of medication. Message sent to Dr. Enrique regarding an update of patient symptoms and plan.     3/15/22:   Increase Wellbutrin  mg by mouth daily to twice daily as patient reports at times taking twice daily to target anxiety, attention & concentration deficit.      Increase Adderall IR 10 mg by mouth from once daily to twice daily to target attention and concentration deficit, take one in the morning and the 2nd dose in afternoon approximately 6 hr apart.    Denies side effects of medication. Message sent to Dr. Enrique regarding an update of patient symptoms and plan.     2/15/22:   -POC UDS  -CSA signed and reviewed with patient   -Continue Wellbutrin  mg by mouth daily to target  anxiety, attention & concentration deficit.    -Adderall IR 5 mg by mouth twice daily to target attention and concentration deficit, take one in the morning and the 2nd dose in afternoon approximately 6 hr apart.  Risks, benefits, side effects discussed with patient including elevated heart rate, elevated blood pressure, irritability, insomnia, sexual dysfunction, appetite suppressing properties, psychosis.  After discussion of these risks and benefits, the patient voiced understanding and agreed to proceed. UDS ordered, Jovita reviewed. Informed patient medication would not be ordered until UDS results received, and would be ordered per Dr. Enrique.   Adderall IR 10 mg by mouth daily was ordered per  on 2/15/22      11/30/22:   Change Wellbutrin  mg twice daily to Wellbutrin  mg by mouth daily in the am to target anxiety, attention & concentration deficit.    ADHD testing referral to Al Cole with [facility] in [place], [place] as patient prefers to see provider closer to home.  List given of providers (4) for testing, patient to contact to schedule appointment.    Patient screened positive for depression based on a PHQ-9 score of 3 on 5/7/2025. Follow-up recommendations include: Prescribed antidepressant medication treatment and Suicide Risk Assessment performed. (PHQ2-0)     TREATMENT PLAN/GOALS: Continue supportive psychotherapy efforts and medications as indicated. Treatment and medication options discussed during today's visit. Patient acknowledged and verbally consented to continue with current treatment plan and was educated on the importance of compliance with treatment and follow-up appointments.    MEDICATION ISSUES:  JOVITA reviewed as expected.  Discussed medication options and treatment plan of prescribed medication as well as the risks, benefits, and side effects including potential falls, possible impaired driving and metabolic adversities among others. Patient is  agreeable to call the office with any worsening of symptoms or onset of side effects. Patient is agreeable to call 911 or go to the nearest ER should he/she begin having SI/HI. No medication side effects or related complaints today.     MEDS ORDERED DURING VISIT:  No orders of the defined types were placed in this encounter.      Return in about 3 months (around 8/9/2025) for Video visit, medication check.          I spent 30 minutes caring for Vickie on this date of service. This time includes time spent by me in the following activities: preparing for the visit, performing a medically appropriate examination and/or evaluation, counseling and educating the patient/family/caregiver, ordering medications, tests, or procedures, referring and communicating with other health care professionals, documenting information in the medical record, care coordination, and scheduling   (Ordering of medications will be seen in a separate encounter due to EHR for [facility])     This document has been electronically signed by NIKUNJ Neri  May 9, 2025 08:26 EDT      Part of this note may be an electronic transcription/translation of spoken language to printed text using the Dragon Dictation System.

## 2025-08-14 DIAGNOSIS — F90.0 ADHD (ATTENTION DEFICIT HYPERACTIVITY DISORDER), INATTENTIVE TYPE: ICD-10-CM

## 2025-08-14 RX ORDER — DEXTROAMPHETAMINE SACCHARATE, AMPHETAMINE ASPARTATE, DEXTROAMPHETAMINE SULFATE AND AMPHETAMINE SULFATE 5; 5; 5; 5 MG/1; MG/1; MG/1; MG/1
20 TABLET ORAL 3 TIMES DAILY
Qty: 270 TABLET | Refills: 0 | Status: SHIPPED | OUTPATIENT
Start: 2025-08-15 | End: 2025-11-13

## 2025-08-18 ENCOUNTER — TELEMEDICINE (OUTPATIENT)
Dept: BEHAVIORAL HEALTH | Facility: CLINIC | Age: 55
End: 2025-08-18
Payer: COMMERCIAL

## 2025-08-18 DIAGNOSIS — Z56.1 CHANGE OF JOB: ICD-10-CM

## 2025-08-18 DIAGNOSIS — Z71.89 MEDICATION CARE PLAN DISCUSSED WITH PATIENT: ICD-10-CM

## 2025-08-18 DIAGNOSIS — Z79.899 MEDICATION MANAGEMENT: ICD-10-CM

## 2025-08-18 DIAGNOSIS — F90.0 ADHD (ATTENTION DEFICIT HYPERACTIVITY DISORDER), INATTENTIVE TYPE: Primary | ICD-10-CM

## 2025-08-18 DIAGNOSIS — F41.1 GENERALIZED ANXIETY DISORDER: ICD-10-CM

## 2025-08-18 SDOH — ECONOMIC STABILITY - INCOME SECURITY: CHANGE OF JOB: Z56.1

## 2025-08-19 ENCOUNTER — TELEPHONE (OUTPATIENT)
Dept: BEHAVIORAL HEALTH | Facility: CLINIC | Age: 55
End: 2025-08-19
Payer: COMMERCIAL